# Patient Record
Sex: FEMALE | Race: WHITE | NOT HISPANIC OR LATINO | Employment: FULL TIME | URBAN - METROPOLITAN AREA
[De-identification: names, ages, dates, MRNs, and addresses within clinical notes are randomized per-mention and may not be internally consistent; named-entity substitution may affect disease eponyms.]

---

## 2017-01-12 ENCOUNTER — OFFICE VISIT (OUTPATIENT)
Dept: INTERNAL MEDICINE | Facility: CLINIC | Age: 52
End: 2017-01-12

## 2017-01-12 VITALS
DIASTOLIC BLOOD PRESSURE: 62 MMHG | TEMPERATURE: 98.2 F | HEART RATE: 73 BPM | HEIGHT: 70 IN | OXYGEN SATURATION: 98 % | WEIGHT: 190 LBS | BODY MASS INDEX: 27.2 KG/M2 | SYSTOLIC BLOOD PRESSURE: 100 MMHG

## 2017-01-12 DIAGNOSIS — K21.9 GASTROESOPHAGEAL REFLUX DISEASE, ESOPHAGITIS PRESENCE NOT SPECIFIED: ICD-10-CM

## 2017-01-12 DIAGNOSIS — E55.9 AVITAMINOSIS D: Primary | ICD-10-CM

## 2017-01-12 DIAGNOSIS — Z00.00 HEALTH CARE MAINTENANCE: ICD-10-CM

## 2017-01-12 DIAGNOSIS — E78.2 MIXED HYPERLIPIDEMIA: ICD-10-CM

## 2017-01-12 DIAGNOSIS — M79.10 MUSCLE ACHE: ICD-10-CM

## 2017-01-12 DIAGNOSIS — E55.9 AVITAMINOSIS D: ICD-10-CM

## 2017-01-12 PROCEDURE — 99214 OFFICE O/P EST MOD 30 MIN: CPT | Performed by: FAMILY MEDICINE

## 2017-01-12 RX ORDER — CALCIUM CARBONATE 750 MG/1
750 TABLET, CHEWABLE ORAL
COMMUNITY
Start: 2021-01-01

## 2017-01-12 RX ORDER — OMEPRAZOLE 40 MG/1
40 CAPSULE, DELAYED RELEASE ORAL DAILY
Qty: 30 CAPSULE | Refills: 3 | Status: SHIPPED | OUTPATIENT
Start: 2017-01-12 | End: 2018-03-01 | Stop reason: SDUPTHER

## 2017-01-12 NOTE — MR AVS SNAPSHOT
Kalpana Stringer   1/12/2017 9:00 AM   Office Visit    Dept Phone:  [phone]   Encounter #:  [id number]    Provider:  Junior Rios MD   Department:  [facility]                Your Full Care Plan              Today's Medication Changes          These changes are accurate as of: 1/12/17  9:55 AM.  If you have any questions, ask your nurse or doctor.               New Medication(s)Ordered:     omeprazole 40 MG capsule   Commonly known as:  PRILOSEC   Take 1 capsule by mouth Daily.   Started by:  Junior Rios MD            Where to Get Your Medications      These medications were sent to [address] - [address] AT [facility]  - [phone]  - [phone]   [address] [place] [address]     Phone:  [phone]     omeprazole 40 MG capsule                  Your Updated Medication List          This list is accurate as of: 1/12/17  9:55 AM.  Always use your most recent med list.                acetaminophen 650 MG 8 hr tablet   Commonly known as:  TYLENOL       calcium carbonate  MG chewable tablet   Commonly known as:  TUMS EX       meloxicam 15 MG tablet   Commonly known as:  MOBIC   Take 1 tablet by mouth daily.       naproxen sodium 220 MG tablet   Commonly known as:  ALEVE       NON FORMULARY       omeprazole 40 MG capsule   Commonly known as:  PRILOSEC   Take 1 capsule by mouth Daily.               We Performed the Following     Ambulatory Referral to Gynecology     H. Pylori Breath Test     Lipid Panel       You Were Diagnosed With        Codes Comments    Avitaminosis D    -  Primary ICD-10-CM: E55.9  ICD-9-CM: 268.9     Muscle ache     ICD-10-CM: M79.1  ICD-9-CM: 729.1     Gastroesophageal reflux disease, esophagitis presence not specified     ICD-10-CM: K21.9  ICD-9-CM: 530.81     Mixed hyperlipidemia     ICD-10-CM: E78.2  ICD-9-CM: 272.2     Health care  "maintenance     ICD-10-CM: Z00.00  ICD-9-CM: V70.0       Instructions     None    Patient Instructions History      Upcoming Appointments     Visit Type Date Time Department    OFFICE VISIT 2017  9:00 AM [facility] [place]      [email or web address] Signup     [facility] [email or web address] allows you to send messages to your doctor, view your test results, renew your prescriptions, schedule appointments, and more. To sign up, go to [employer] and click on the Sign Up Now link in the New User? box. Enter your [email or web address] Activation Code exactly as it appears below along with the last four digits of your Social Security Number and your Date of Birth () to complete the sign-up process. If you do not sign up before the expiration date, you must request a new code.    [email or web address] Activation Code: [email or web address]  Expires: 2017  9:55 AM    If you have questions, you can email [email or web address] or call [phone] to talk to our [email or web address] staff. Remember, [email or web address] is NOT to be used for urgent needs. For medical emergencies, dial 911.               Other Info from Your Visit           Allergies     No Known Allergies      Reason for Visit     been feeling bad (feels a catch in the neck) right side    heartburns for about a month    Cough for about a month      Vital Signs     Blood Pressure Pulse Temperature Height Weight Oxygen Saturation    100/62 (BP Location: Left arm, Patient Position: Sitting, Cuff Size: Large Adult) 73 98.2 °F (36.8 °C) (Oral) 70\" (177.8 cm) 190 lb (86.2 kg) 98%    Breastfeeding? Body Mass Index Smoking Status             No 27.26 kg/m2 Current Some Day Smoker         Problems and Diagnoses Noted     Avitaminosis D    Acid reflux disease    High cholesterol or triglycerides    Muscle ache    Health maintenance examination            "

## 2017-01-12 NOTE — PROGRESS NOTES
Subjective   Kalpana Stringer is a 51 y.o. female.     Chief Complaint   Patient presents with   • been feeling bad     (feels a catch in the neck) right side   • heartburns     for about a month   • Cough     for about a month         History of Present Illness   Tums Zantac for heartburn which is been developing in the last month she has had mostly a burning sensation radiating from her upper abdomen throat she does not drink alcohol she does not smoke some caffeine use symptoms are worsening  Mobic is used intermittently for muscle skeletal pain she did not have lab drawn was recommended previous visit we'll try to have that drawn today she has not had recent pelvic exam.  Does have a history of anxiety and vitamin D deficiency  The following portions of the patient's history were reviewed and updated as appropriate: allergies, current medications, past family history, past medical history, past social history, past surgical history and problem list.    Review of Systems   Constitutional: Negative for activity change, appetite change, fatigue, fever and unexpected weight change.   HENT: Negative for nosebleeds and trouble swallowing.    Eyes: Negative for pain and visual disturbance.   Respiratory: Negative for chest tightness, shortness of breath and wheezing.    Cardiovascular: Negative for chest pain and palpitations.   Gastrointestinal: Negative for abdominal pain and blood in stool.   Endocrine: Negative.    Genitourinary: Negative for difficulty urinating and hematuria.   Musculoskeletal: Positive for arthralgias and myalgias. Negative for joint swelling.   Skin: Negative for color change and rash.   Allergic/Immunologic: Negative.    Neurological: Negative for syncope, speech difficulty and weakness.   Hematological: Negative for adenopathy.   Psychiatric/Behavioral: Negative for agitation and confusion.   All other systems reviewed and are negative.      Objective   Physical Exam   Constitutional: She is  oriented to person, place, and time. She appears well-developed and well-nourished. No distress.   HENT:   Head: Normocephalic and atraumatic.   Eyes: Conjunctivae and EOM are normal. Pupils are equal, round, and reactive to light. Right eye exhibits no discharge. Left eye exhibits no discharge. No scleral icterus.   Neck: Normal range of motion. Neck supple. No tracheal deviation present. No thyromegaly present.   Cardiovascular: Normal rate, regular rhythm, normal heart sounds, intact distal pulses and normal pulses.  Exam reveals no gallop.    No murmur heard.  Pulmonary/Chest: Effort normal and breath sounds normal. No respiratory distress. She has no wheezes. She has no rales.   Abdominal: Soft. There is tenderness.   Epigastric   Musculoskeletal: Normal range of motion.   Tenderness since healed bilaterally no erythema noted high arched foot  tenderness knees elbows and ankles without any evidence of effusion   Neurological: She is alert and oriented to person, place, and time. She exhibits normal muscle tone. Coordination normal.   Skin: Skin is warm. No rash noted. No erythema. No pallor.   Psychiatric: She has a normal mood and affect. Her behavior is normal. Judgment and thought content normal.   Nursing note and vitals reviewed.      Assessment/Plan   Kalpana was seen today for been feeling bad, heartburns and cough.    Diagnoses and all orders for this visit:    Avitaminosis D  -     Vitamin D 25 Hydroxy; Future  -     Comprehensive Metabolic Panel; Future  -     CBC & AUTO Differential; Future    Muscle ache  -     LINH; Future  -     RHEUMATOID FACTOR; Future  -     Uric Acid; Future  -     TSH; Future  -     Comprehensive Metabolic Panel; Future  -     CBC & AUTO Differential; Future    Gastroesophageal reflux disease, esophagitis presence not specified  -     Cancel: H. Pylori Breath Test  -     H. Pylori Breath Test; Future  -     Comprehensive Metabolic Panel; Future  -     CBC & AUTO Differential;  Future    Mixed hyperlipidemia  -     Comprehensive Metabolic Panel; Future  -     CBC & AUTO Differential; Future    Health care maintenance  -     Ambulatory Referral to Gynecology  -     Lipid Panel  -     H. Pylori Breath Test; Future  -     LINH; Future  -     RHEUMATOID FACTOR; Future  -     Vitamin D 25 Hydroxy; Future  -     Uric Acid; Future  -     TSH; Future  -     Comprehensive Metabolic Panel; Future  -     CBC & AUTO Differential; Future    Other orders  -     omeprazole (PRILOSEC) 40 MG capsule; Take 1 capsule by mouth Daily.     follow-up results of blood work and breath test

## 2017-01-18 ENCOUNTER — TELEPHONE (OUTPATIENT)
Dept: INTERNAL MEDICINE | Facility: CLINIC | Age: 52
End: 2017-01-18

## 2017-01-18 LAB
25(OH)D3+25(OH)D2 SERPL-MCNC: 28.1 NG/ML (ref 30–100)
ALBUMIN SERPL-MCNC: 4.2 G/DL (ref 3.5–5.2)
ALBUMIN/GLOB SERPL: 1.7 G/DL
ALP SERPL-CCNC: 62 U/L (ref 39–117)
ALT SERPL-CCNC: 19 U/L (ref 1–33)
ANA SER QL: NEGATIVE
AST SERPL-CCNC: 16 U/L (ref 1–32)
BASOPHILS # BLD AUTO: 0.06 10*3/MM3 (ref 0–0.2)
BASOPHILS NFR BLD AUTO: 1.1 % (ref 0–1.5)
BILIRUB SERPL-MCNC: 0.3 MG/DL (ref 0.1–1.2)
BUN SERPL-MCNC: 18 MG/DL (ref 6–20)
BUN/CREAT SERPL: 20 (ref 7–25)
CALCIUM SERPL-MCNC: 9.6 MG/DL (ref 8.6–10.5)
CHLORIDE SERPL-SCNC: 102 MMOL/L (ref 98–107)
CHOLEST SERPL-MCNC: 202 MG/DL (ref 0–200)
CO2 SERPL-SCNC: 27 MMOL/L (ref 22–29)
CREAT SERPL-MCNC: 0.9 MG/DL (ref 0.57–1)
EOSINOPHIL # BLD AUTO: 0.12 10*3/MM3 (ref 0–0.7)
EOSINOPHIL NFR BLD AUTO: 2.3 % (ref 0.3–6.2)
ERYTHROCYTE [DISTWIDTH] IN BLOOD BY AUTOMATED COUNT: 13.8 % (ref 11.7–13)
GLOBULIN SER CALC-MCNC: 2.5 GM/DL
GLUCOSE SERPL-MCNC: 92 MG/DL (ref 65–99)
HCT VFR BLD AUTO: 40 % (ref 35.6–45.5)
HDLC SERPL-MCNC: 62 MG/DL (ref 40–60)
HGB BLD-MCNC: 12.6 G/DL (ref 11.9–15.5)
IMM GRANULOCYTES # BLD: 0 10*3/MM3 (ref 0–0.03)
IMM GRANULOCYTES NFR BLD: 0 % (ref 0–0.5)
LDLC SERPL CALC-MCNC: 115 MG/DL (ref 0–100)
LYMPHOCYTES # BLD AUTO: 1.69 10*3/MM3 (ref 0.9–4.8)
LYMPHOCYTES NFR BLD AUTO: 31.9 % (ref 19.6–45.3)
MCH RBC QN AUTO: 29.6 PG (ref 26.9–32)
MCHC RBC AUTO-ENTMCNC: 31.5 G/DL (ref 32.4–36.3)
MCV RBC AUTO: 94.1 FL (ref 80.5–98.2)
MONOCYTES # BLD AUTO: 0.73 10*3/MM3 (ref 0.2–1.2)
MONOCYTES NFR BLD AUTO: 13.8 % (ref 5–12)
NEUTROPHILS # BLD AUTO: 2.7 10*3/MM3 (ref 1.9–8.1)
NEUTROPHILS NFR BLD AUTO: 50.9 % (ref 42.7–76)
PLATELET # BLD AUTO: 226 10*3/MM3 (ref 140–500)
POTASSIUM SERPL-SCNC: 4.2 MMOL/L (ref 3.5–5.2)
PROT SERPL-MCNC: 6.7 G/DL (ref 6–8.5)
RBC # BLD AUTO: 4.25 10*6/MM3 (ref 3.9–5.2)
RHEUMATOID FACT SERPL-ACNC: <10 IU/ML (ref 0–13.9)
SODIUM SERPL-SCNC: 141 MMOL/L (ref 136–145)
TRIGL SERPL-MCNC: 123 MG/DL (ref 0–150)
TSH SERPL DL<=0.005 MIU/L-ACNC: 1.69 MIU/ML (ref 0.27–4.2)
URATE SERPL-MCNC: 4.5 MG/DL (ref 2.4–5.7)
UREA BREATH TEST QL: POSITIVE
VLDLC SERPL CALC-MCNC: 24.6 MG/DL (ref 5–40)
WBC # BLD AUTO: 5.3 10*3/MM3 (ref 4.5–10.7)

## 2017-01-18 RX ORDER — AMOXICILLIN 500 MG/1
1000 CAPSULE ORAL 2 TIMES DAILY
Qty: 40 CAPSULE | Refills: 0 | Status: SHIPPED | OUTPATIENT
Start: 2017-01-18 | End: 2018-07-14

## 2017-01-18 RX ORDER — CLARITHROMYCIN 500 MG/1
500 TABLET, COATED ORAL 2 TIMES DAILY
Qty: 20 TABLET | Refills: 0 | Status: SHIPPED | OUTPATIENT
Start: 2017-01-18 | End: 2018-07-14

## 2017-01-18 NOTE — TELEPHONE ENCOUNTER
----- Message from Junior Rios MD sent at 1/18/2017  2:24 PM EST -----  H. Pylori stomach test is positive and given clinical situation would recommend treatment with double antibiotic prescription continue the omeprazole start amoxicillin 1 g twice a day for 10 days and clarithromycin 500 mg twice a day for 10 days then follow up one month, sooner if symptoms worsen

## 2018-03-01 RX ORDER — OMEPRAZOLE 40 MG/1
40 CAPSULE, DELAYED RELEASE ORAL DAILY
Qty: 30 CAPSULE | Refills: 0 | Status: SHIPPED | OUTPATIENT
Start: 2018-03-01 | End: 2018-08-21 | Stop reason: SDUPTHER

## 2018-03-01 RX ORDER — OMEPRAZOLE 40 MG/1
40 CAPSULE, DELAYED RELEASE ORAL DAILY
Qty: 30 CAPSULE | Refills: 3 | Status: CANCELLED | OUTPATIENT
Start: 2018-03-01

## 2018-04-12 ENCOUNTER — TELEPHONE (OUTPATIENT)
Dept: INTERNAL MEDICINE | Facility: CLINIC | Age: 53
End: 2018-04-12

## 2018-04-13 ENCOUNTER — TELEPHONE (OUTPATIENT)
Dept: INTERNAL MEDICINE | Facility: CLINIC | Age: 53
End: 2018-04-13

## 2018-04-13 DIAGNOSIS — Z20.2 EXPOSURE TO STD: Primary | ICD-10-CM

## 2018-04-13 NOTE — TELEPHONE ENCOUNTER
Patient called stating that she is 110% sure that she has herpes from a previous partner.  Her vagina is very painful and lesions.  Patient wanted to know if she should be seen by Dr. Rios or be referred to GYN.  Please advise.

## 2018-05-16 ENCOUNTER — PROCEDURE VISIT (OUTPATIENT)
Dept: OBSTETRICS AND GYNECOLOGY | Facility: CLINIC | Age: 53
End: 2018-05-16

## 2018-05-16 ENCOUNTER — OFFICE VISIT (OUTPATIENT)
Dept: OBSTETRICS AND GYNECOLOGY | Facility: CLINIC | Age: 53
End: 2018-05-16

## 2018-05-16 VITALS — BODY MASS INDEX: 28.61 KG/M2 | DIASTOLIC BLOOD PRESSURE: 70 MMHG | WEIGHT: 199.4 LBS | SYSTOLIC BLOOD PRESSURE: 110 MMHG

## 2018-05-16 DIAGNOSIS — R10.2 PELVIC PAIN: Primary | ICD-10-CM

## 2018-05-16 DIAGNOSIS — Z01.419 ENCOUNTER FOR WELL WOMAN EXAM WITH ROUTINE GYNECOLOGICAL EXAM: ICD-10-CM

## 2018-05-16 DIAGNOSIS — R10.2 PELVIC PAIN IN FEMALE: ICD-10-CM

## 2018-05-16 DIAGNOSIS — Z00.00 ANNUAL PHYSICAL EXAM: ICD-10-CM

## 2018-05-16 DIAGNOSIS — Z12.31 VISIT FOR SCREENING MAMMOGRAM: ICD-10-CM

## 2018-05-16 DIAGNOSIS — B00.9 HERPES: ICD-10-CM

## 2018-05-16 PROCEDURE — 76830 TRANSVAGINAL US NON-OB: CPT | Performed by: OBSTETRICS & GYNECOLOGY

## 2018-05-16 PROCEDURE — 99386 PREV VISIT NEW AGE 40-64: CPT | Performed by: OBSTETRICS & GYNECOLOGY

## 2018-05-16 PROCEDURE — 99213 OFFICE O/P EST LOW 20 MIN: CPT | Performed by: OBSTETRICS & GYNECOLOGY

## 2018-05-16 RX ORDER — VALACYCLOVIR HYDROCHLORIDE 500 MG/1
500 TABLET, FILM COATED ORAL 2 TIMES DAILY
Qty: 18 TABLET | Refills: 5 | Status: SHIPPED | OUTPATIENT
Start: 2018-05-16 | End: 2018-05-19

## 2018-05-16 NOTE — PROGRESS NOTES
HPI   Kalpana Stringer  is a 53 y.o. female who presents to [facility] care and have a routine gynecologic exam.  We discussed the importance of regular screening mammograms as well as the importance of colonoscopy.  The patient is been menopausal for 2 years.  She also complains of some pelvic pain and pressure.  She reports that the pain is intermittent and sharp.  It is noncyclic.  It originally occurred only several times a week, but now occurs daily.  Episodes of pain lasts for approximately 5 minutes.  The patient has not expressed any vaginal bleeding in 2 years.  Also, she was exposed to herpes a proximally 6 months ago.  By history, she had what extent appears to be a primary herpes outbreak in January.  She has experienced one additional herpes outbreak approximately 1 month ago.  She would like to discuss options for management.    Chief Complaint   Patient presents with   • Gynecologic Exam   • Exposure to STD     Exposed to herpes   • Menopause     Hasn't had a cycle in a couple of years and has had a discomfort in the pelvic area for a couple of months       Past Medical History:   Diagnosis Date   • Anxiety    • Hyperlipidemia    • Subclavian artery stenosis        Past Surgical History:   Procedure Laterality Date   • TUBAL ABDOMINAL LIGATION         Social History     Social History   • Marital status: Unknown     Spouse name: N/A   • Number of children: 2   • Years of education: N/A     Occupational History   • Not on file.     Social History Main Topics   • Smoking status: Current Some Day Smoker     Types: Cigarettes   • Smokeless tobacco: Current User   • Alcohol use Yes      Comment: Socially   • Drug use: No   • Sexual activity: Not Currently     Partners: Male     Birth control/ protection: Surgical     Other Topics Concern   • Not on file     Social History Narrative   • No narrative on file       The following portions of the patient's history were reviewed and updated as appropriate:  allergies, current medications, past family history, past medical history, past social history, past surgical history and problem list.    Review of Systems is positive for pelvic pain and pressure.  It is negative for nausea or vomiting.  It is negative for fever or chills.  It is positive for episodes of vesicular vulvar outbreaks.  It is negative for unexplained weight change.  All other systems are reviewed and are negative          Physical Exam   Constitutional: She is oriented to person, place, and time. She appears well-developed and well-nourished.   HENT:   Head: Normocephalic and atraumatic.   Cardiovascular: Normal rate and regular rhythm.    Pulmonary/Chest: Effort normal and breath sounds normal. She has no wheezes. She has no rales.   The breasts are equal in size.  There are no palpable lumps.  Nipple discharge and axillary adenopathy are absent.   Abdominal: Soft. She exhibits no distension. There is no tenderness.   Genitourinary: Vagina normal and uterus normal. There is no lesion on the right labia. There is no lesion on the left labia. Cervix exhibits no motion tenderness. Right adnexum displays no mass and no tenderness. Left adnexum displays no mass and no tenderness. No vaginal discharge found.   Neurological: She is alert and oriented to person, place, and time.   Skin: Skin is warm and dry.   Nursing note and vitals reviewed.      Assessment    Kalpana was seen today for gynecologic exam, exposure to std and menopause.    Diagnoses and all orders for this visit:    Pelvic pain  -     US Non-ob Transvaginal    Herpes    Annual physical exam  -     Ambulatory Referral to General Surgery        Plan  1. Normal gynecologic exam  2. Counseled regarding a cog recommendations for mammograms every year after the age of 50.  Patient's last mammogram was 3 years ago.  She would like to have a mammogram.  We will help her to arrange this.  3. Counseled regarding recommendations for colonoscopy every 10  years.  The patient has not yet undergone this.  We discussed the importance of it.  The patient would like to proceed.  Referral will be made.  4. Herpes type II.  We discussed the pathophysiology of this condition and options for management.  The patient has experienced 2 outbreaks over the last year.  She does not wish to proceed with suppression, but would like to have episodic Valtrex available.  A prescription will be sent to the patient's pharmacy.  She would also like to do blood work to confirm the diagnosis.  We discussed the limitations of this testing.  The patient would like to proceed.  5. Intermittent pelvic pain and pressure.  Physical examination is nonfocal.  Counseled.  We will check an ultrasound to assess the endometrium and the adnexa.      History   Smoking Status   • Current Some Day Smoker   • Types: Cigarettes   6.     7.

## 2018-05-17 LAB
HSV1 IGG SER IA-ACNC: <0.91 INDEX (ref 0–0.9)
HSV1+2 IGM SER IA-ACNC: 1.52 RATIO (ref 0–0.9)
HSV2 IGG SER IA-ACNC: 10.9 INDEX (ref 0–0.9)

## 2018-05-21 LAB
CONV .: NORMAL
CYTOLOGIST CVX/VAG CYTO: NORMAL
CYTOLOGY CVX/VAG DOC THIN PREP: NORMAL
DX ICD CODE: NORMAL
HIV 1 & 2 AB SER-IMP: NORMAL
OTHER STN SPEC: NORMAL
PATH REPORT.FINAL DX SPEC: NORMAL
STAT OF ADQ CVX/VAG CYTO-IMP: NORMAL

## 2018-05-24 ENCOUNTER — TELEPHONE (OUTPATIENT)
Dept: OBSTETRICS AND GYNECOLOGY | Facility: CLINIC | Age: 53
End: 2018-05-24

## 2018-05-24 NOTE — TELEPHONE ENCOUNTER
----- Message from Roger Clarke MD sent at 5/23/2018  3:01 PM EDT -----  I could not reach the patient by phone despite several attempts.  Please contact her regarding 2 issues.  First, her herpes cultures were positive.  This will not be unexpected.  If the patient has questions or concerns, please let me know a time that I can call her to discuss them.  Second, the patient's ultrasound was negative with respect to the uterus, tubes and ovaries.  It did, however, show very active loops of bowel.  This may be consistent with spastic colon or irritable bowel.  I would be happy to arrange a follow-up with a gastroenterologist for further workup.  Please let me know.  Thank you.

## 2018-07-14 ENCOUNTER — APPOINTMENT (OUTPATIENT)
Dept: GENERAL RADIOLOGY | Facility: HOSPITAL | Age: 53
End: 2018-07-14

## 2018-07-14 ENCOUNTER — HOSPITAL ENCOUNTER (EMERGENCY)
Facility: HOSPITAL | Age: 53
Discharge: HOME | End: 2018-07-14
Attending: EMERGENCY MEDICINE | Admitting: EMERGENCY MEDICINE

## 2018-07-14 VITALS
OXYGEN SATURATION: 96 % | BODY MASS INDEX: 26.48 KG/M2 | HEART RATE: 53 BPM | RESPIRATION RATE: 18 BRPM | DIASTOLIC BLOOD PRESSURE: 74 MMHG | SYSTOLIC BLOOD PRESSURE: 132 MMHG | TEMPERATURE: 98.1 F | HEIGHT: 70 IN | WEIGHT: 185 LBS

## 2018-07-14 DIAGNOSIS — R07.89 ATYPICAL CHEST PAIN: Primary | ICD-10-CM

## 2018-07-14 DIAGNOSIS — R91.1 NODULE OF RIGHT LUNG: ICD-10-CM

## 2018-07-14 LAB
ALBUMIN SERPL-MCNC: 4.3 G/DL (ref 3.5–5.2)
ALBUMIN/GLOB SERPL: 1.6 G/DL
ALP SERPL-CCNC: 64 U/L (ref 39–117)
ALT SERPL W P-5'-P-CCNC: 38 U/L (ref 1–33)
ANION GAP SERPL CALCULATED.3IONS-SCNC: 12.2 MMOL/L
AST SERPL-CCNC: 23 U/L (ref 1–32)
BASOPHILS # BLD AUTO: 0.04 10*3/MM3 (ref 0–0.2)
BASOPHILS NFR BLD AUTO: 0.8 % (ref 0–1.5)
BILIRUB SERPL-MCNC: 0.3 MG/DL (ref 0.1–1.2)
BUN BLD-MCNC: 14 MG/DL (ref 6–20)
BUN/CREAT SERPL: 16.1 (ref 7–25)
CALCIUM SPEC-SCNC: 8.8 MG/DL (ref 8.6–10.5)
CHLORIDE SERPL-SCNC: 103 MMOL/L (ref 98–107)
CO2 SERPL-SCNC: 24.8 MMOL/L (ref 22–29)
CREAT BLD-MCNC: 0.87 MG/DL (ref 0.57–1)
DEPRECATED RDW RBC AUTO: 45.5 FL (ref 37–54)
EOSINOPHIL # BLD AUTO: 0.13 10*3/MM3 (ref 0–0.7)
EOSINOPHIL NFR BLD AUTO: 2.7 % (ref 0.3–6.2)
ERYTHROCYTE [DISTWIDTH] IN BLOOD BY AUTOMATED COUNT: 13.4 % (ref 11.7–13)
GFR SERPL CREATININE-BSD FRML MDRD: 69 ML/MIN/1.73
GLOBULIN UR ELPH-MCNC: 2.7 GM/DL
GLUCOSE BLD-MCNC: 83 MG/DL (ref 65–99)
HCT VFR BLD AUTO: 39.5 % (ref 35.6–45.5)
HGB BLD-MCNC: 12.4 G/DL (ref 11.9–15.5)
HOLD SPECIMEN: NORMAL
HOLD SPECIMEN: NORMAL
IMM GRANULOCYTES # BLD: 0 10*3/MM3 (ref 0–0.03)
IMM GRANULOCYTES NFR BLD: 0 % (ref 0–0.5)
INR PPP: 1.01 (ref 0.9–1.1)
LYMPHOCYTES # BLD AUTO: 2.4 10*3/MM3 (ref 0.9–4.8)
LYMPHOCYTES NFR BLD AUTO: 50.6 % (ref 19.6–45.3)
MCH RBC QN AUTO: 29.4 PG (ref 26.9–32)
MCHC RBC AUTO-ENTMCNC: 31.4 G/DL (ref 32.4–36.3)
MCV RBC AUTO: 93.6 FL (ref 80.5–98.2)
MONOCYTES # BLD AUTO: 0.44 10*3/MM3 (ref 0.2–1.2)
MONOCYTES NFR BLD AUTO: 9.3 % (ref 5–12)
NEUTROPHILS # BLD AUTO: 1.73 10*3/MM3 (ref 1.9–8.1)
NEUTROPHILS NFR BLD AUTO: 36.6 % (ref 42.7–76)
PLATELET # BLD AUTO: 238 10*3/MM3 (ref 140–500)
PMV BLD AUTO: 9.8 FL (ref 6–12)
POTASSIUM BLD-SCNC: 3.8 MMOL/L (ref 3.5–5.2)
PROT SERPL-MCNC: 7 G/DL (ref 6–8.5)
PROTHROMBIN TIME: 13.1 SECONDS (ref 11.7–14.2)
RBC # BLD AUTO: 4.22 10*6/MM3 (ref 3.9–5.2)
SODIUM BLD-SCNC: 140 MMOL/L (ref 136–145)
TROPONIN T SERPL-MCNC: <0.01 NG/ML (ref 0–0.03)
WBC NRBC COR # BLD: 4.74 10*3/MM3 (ref 4.5–10.7)
WHOLE BLOOD HOLD SPECIMEN: NORMAL
WHOLE BLOOD HOLD SPECIMEN: NORMAL

## 2018-07-14 PROCEDURE — 99284 EMERGENCY DEPT VISIT MOD MDM: CPT

## 2018-07-14 PROCEDURE — 93005 ELECTROCARDIOGRAM TRACING: CPT | Performed by: EMERGENCY MEDICINE

## 2018-07-14 PROCEDURE — 93005 ELECTROCARDIOGRAM TRACING: CPT

## 2018-07-14 PROCEDURE — 84484 ASSAY OF TROPONIN QUANT: CPT | Performed by: PHYSICIAN ASSISTANT

## 2018-07-14 PROCEDURE — 36415 COLL VENOUS BLD VENIPUNCTURE: CPT

## 2018-07-14 PROCEDURE — 85025 COMPLETE CBC W/AUTO DIFF WBC: CPT | Performed by: PHYSICIAN ASSISTANT

## 2018-07-14 PROCEDURE — 93010 ELECTROCARDIOGRAM REPORT: CPT | Performed by: INTERNAL MEDICINE

## 2018-07-14 PROCEDURE — 71046 X-RAY EXAM CHEST 2 VIEWS: CPT

## 2018-07-14 PROCEDURE — 85610 PROTHROMBIN TIME: CPT | Performed by: PHYSICIAN ASSISTANT

## 2018-07-14 PROCEDURE — 80053 COMPREHEN METABOLIC PANEL: CPT | Performed by: PHYSICIAN ASSISTANT

## 2018-07-14 RX ORDER — IBUPROFEN 200 MG
200 TABLET ORAL EVERY 6 HOURS PRN
COMMUNITY
End: 2018-07-14

## 2018-07-14 RX ORDER — SODIUM CHLORIDE 0.9 % (FLUSH) 0.9 %
10 SYRINGE (ML) INJECTION AS NEEDED
Status: DISCONTINUED | OUTPATIENT
Start: 2018-07-14 | End: 2018-07-14 | Stop reason: HOSPADM

## 2018-07-14 RX ORDER — CYCLOBENZAPRINE HCL 10 MG
5 TABLET ORAL 3 TIMES DAILY PRN
COMMUNITY
End: 2020-06-01

## 2018-07-14 RX ORDER — ASPIRIN 325 MG
325 TABLET ORAL ONCE
Status: DISCONTINUED | OUTPATIENT
Start: 2018-07-14 | End: 2018-07-14 | Stop reason: HOSPADM

## 2018-07-14 NOTE — ED PROVIDER NOTES
EMERGENCY DEPARTMENT ENCOUNTER    Room Number:  29/29  Date seen:  7/14/2018  Time seen: 6:47 PM  PCP: Junior Rios MD  Historian: patient  History Limited By: N/A      HPI:  Chief Complaint: chest pain  Context: Kalpana Stringer is a 53 y.o. female who presents to the ED c/o chest pain. She reports that for approximately the past 6 months, she has had aching left upper chest pain radiating to the left upper arm. It was initially intermittent but has been constant for the past week. It is not affected by movement, taking deep breaths, coughing, eating food, or exertion and has no aggravating factors or alleviating factors. She reports that she has also had dyspnea. She denies nausea, vomiting, sweating, weakness, dizziness, fevers, chills, abd pain, diarrhea, pain and difficulty with urination, acute change in chronic right ankle swelling/new BLE swelling, illicit drug use, personal hx of heart disease/HTN/diabetes/hyperlipidemia, and family hx of heart disease. She notes that she is a smoker and drinks ETOH socially. Pt has no other complaints at this time.    Location: left upper chest  Radiation: left upper arm  Quality: aching  Intensity/Severity: moderate  Duration: for about 6 months   Onset quality: gradual  Timing: initially intermittent, constant over the past week  Progression: initially intermittent, constant over the past week  Aggravating Factors: none  Alleviating Factors: none  Previous Episodes: none  Treatment before arrival: none mentioned  Associated Symptoms: dyspnea        PAST MEDICAL HISTORY  Active Ambulatory Problems     Diagnosis Date Noted   • Anxiety 04/28/2016   • HLD (hyperlipidemia) 04/28/2016   • Muscle ache 04/28/2016   • Stenosis of subclavian artery (CMS/HCC) 04/28/2016   • Avitaminosis D 04/28/2016   • Arthritis 04/28/2016   • Gastroesophageal reflux disease 01/12/2017     Resolved Ambulatory Problems     Diagnosis Date Noted   • No Resolved Ambulatory Problems     Past  Medical History:   Diagnosis Date   • Anxiety    • Hyperlipidemia    • Subclavian artery stenosis (CMS/HCC)          PAST SURGICAL HISTORY  Past Surgical History:   Procedure Laterality Date   • TUBAL ABDOMINAL LIGATION           FAMILY HISTORY  Family History   Problem Relation Age of Onset   • Diabetes Mother    • Hyperlipidemia Mother    • Hypertension Mother    • Stroke Mother    • Hyperlipidemia Father    • Hypertension Father    • Arthritis Father         Rheumatoid   • Fibromyalgia Sister    • Breast cancer Paternal Aunt    • Cervical cancer Paternal Aunt          SOCIAL HISTORY  Social History     Social History   • Marital status: Single     Spouse name: N/A   • Number of children: 2   • Years of education: N/A     Occupational History   • Not on file.     Social History Main Topics   • Smoking status: Current Some Day Smoker     Types: Cigarettes   • Smokeless tobacco: Current User   • Alcohol use Yes      Comment: Socially   • Drug use: No   • Sexual activity: Not Currently     Partners: Male     Birth control/ protection: Surgical     Other Topics Concern   • Not on file     Social History Narrative   • No narrative on file         ALLERGIES  Patient has no known allergies.        REVIEW OF SYSTEMS  Review of Systems   Constitutional: Negative for chills, diaphoresis and fever.   HENT: Negative for congestion, rhinorrhea and sore throat.    Eyes: Negative for pain.   Respiratory: Positive for shortness of breath. Negative for cough.    Cardiovascular: Positive for chest pain (left upper chest pain radiating to left upper arm). Negative for palpitations. Leg swelling: no acute change in chronic right ankle swelling/no new BLE swelling.   Gastrointestinal: Negative for abdominal pain, diarrhea, nausea and vomiting.   Endocrine: Negative.    Genitourinary: Negative for difficulty urinating.   Musculoskeletal: Negative for myalgias.   Skin: Negative.    Neurological: Negative for speech difficulty, weakness,  numbness and headaches.   Psychiatric/Behavioral: Negative for behavioral problems.   All other systems reviewed and are negative.           PHYSICAL EXAM  ED Triage Vitals   Temp Heart Rate Resp BP SpO2   07/14/18 1624 07/14/18 1624 07/14/18 1624 07/14/18 1636 07/14/18 1624   98.1 °F (36.7 °C) 95 18 154/89 98 % WNL      Temp src Heart Rate Source Patient Position BP Location FiO2 (%)   07/14/18 1624 07/14/18 1624 07/14/18 1813 07/14/18 1813 --   Tympanic Monitor Sitting Right arm        Physical Exam   Constitutional: She is oriented to person, place, and time. No distress.   HENT:   Head: Normocephalic.   Mouth/Throat: Mucous membranes are normal.   Eyes: EOM are normal. Pupils are equal, round, and reactive to light.   Neck: Normal range of motion. Neck supple.   Cardiovascular: Normal rate, regular rhythm, normal heart sounds and intact distal pulses.    Pulses:       Radial pulses are 2+ on the left side.        Posterior tibial pulses are 2+ on the right side, and 2+ on the left side.   Pulmonary/Chest: Effort normal and breath sounds normal. No respiratory distress. She has no decreased breath sounds. She has no wheezes. She has no rhonchi. She has no rales. She exhibits tenderness (tenderness to upper left chest wall that reproduces chest pain).   Abdominal: Soft. There is no tenderness. There is no rebound and no guarding.   Musculoskeletal: Normal range of motion. She exhibits no edema (no BLE edema).   Tenderness to soft tissue of anterior left shoulder (no bony tenderness), NV intact distally to BUE   Neurological: She is alert and oriented to person, place, and time. She has normal motor skills and normal sensation.   Skin: Skin is warm and dry.   Psychiatric: Mood and affect normal.   Nursing note and vitals reviewed.          LAB RESULTS  Recent Results (from the past 24 hour(s))   Comprehensive Metabolic Panel    Collection Time: 07/14/18  5:35 PM   Result Value Ref Range    Glucose 83 65 - 99 mg/dL     BUN 14 6 - 20 mg/dL    Creatinine 0.87 0.57 - 1.00 mg/dL    Sodium 140 136 - 145 mmol/L    Potassium 3.8 3.5 - 5.2 mmol/L    Chloride 103 98 - 107 mmol/L    CO2 24.8 22.0 - 29.0 mmol/L    Calcium 8.8 8.6 - 10.5 mg/dL    Total Protein 7.0 6.0 - 8.5 g/dL    Albumin 4.30 3.50 - 5.20 g/dL    ALT (SGPT) 38 (H) 1 - 33 U/L    AST (SGOT) 23 1 - 32 U/L    Alkaline Phosphatase 64 39 - 117 U/L    Total Bilirubin 0.3 0.1 - 1.2 mg/dL    eGFR Non African Amer 69 >60 mL/min/1.73    Globulin 2.7 gm/dL    A/G Ratio 1.6 g/dL    BUN/Creatinine Ratio 16.1 7.0 - 25.0    Anion Gap 12.2 mmol/L   Troponin    Collection Time: 07/14/18  5:35 PM   Result Value Ref Range    Troponin T <0.010 0.000 - 0.030 ng/mL   Protime-INR    Collection Time: 07/14/18  5:35 PM   Result Value Ref Range    Protime 13.1 11.7 - 14.2 Seconds    INR 1.01 0.90 - 1.10   Light Blue Top    Collection Time: 07/14/18  5:35 PM   Result Value Ref Range    Extra Tube hold for add-on    Green Top (Gel)    Collection Time: 07/14/18  5:35 PM   Result Value Ref Range    Extra Tube Hold for add-ons.    Lavender Top    Collection Time: 07/14/18  5:35 PM   Result Value Ref Range    Extra Tube hold for add-on    Gold Top - SST    Collection Time: 07/14/18  5:35 PM   Result Value Ref Range    Extra Tube Hold for add-ons.    CBC Auto Differential    Collection Time: 07/14/18  5:35 PM   Result Value Ref Range    WBC 4.74 4.50 - 10.70 10*3/mm3    RBC 4.22 3.90 - 5.20 10*6/mm3    Hemoglobin 12.4 11.9 - 15.5 g/dL    Hematocrit 39.5 35.6 - 45.5 %    MCV 93.6 80.5 - 98.2 fL    MCH 29.4 26.9 - 32.0 pg    MCHC 31.4 (L) 32.4 - 36.3 g/dL    RDW 13.4 (H) 11.7 - 13.0 %    RDW-SD 45.5 37.0 - 54.0 fl    MPV 9.8 6.0 - 12.0 fL    Platelets 238 140 - 500 10*3/mm3    Neutrophil % 36.6 (L) 42.7 - 76.0 %    Lymphocyte % 50.6 (H) 19.6 - 45.3 %    Monocyte % 9.3 5.0 - 12.0 %    Eosinophil % 2.7 0.3 - 6.2 %    Basophil % 0.8 0.0 - 1.5 %    Immature Grans % 0.0 0.0 - 0.5 %    Neutrophils, Absolute 1.73  (L) 1.90 - 8.10 10*3/mm3    Lymphocytes, Absolute 2.40 0.90 - 4.80 10*3/mm3    Monocytes, Absolute 0.44 0.20 - 1.20 10*3/mm3    Eosinophils, Absolute 0.13 0.00 - 0.70 10*3/mm3    Basophils, Absolute 0.04 0.00 - 0.20 10*3/mm3    Immature Grans, Absolute 0.00 0.00 - 0.03 10*3/mm3       Ordered the above labs and reviewed the results.        RADIOLOGY  XR Chest 2 View (Preliminary result)   Result time 07/14/18 17:40:52 (independently viewed by me, interpreted by radiologist)    Preliminary result by Leroy Torres MD (07/14/18 17:40:52)                Impression:    Two small areas of increased density in the right upper  lung as described. Suggest correlation to any recent outside chest  radiographs or CT scans of the chest. If none are available, consider  follow-up CT of the chest with contrast for further evaluation.               Narrative:    PA AND LATERAL CHEST 07/14/2018      HISTORY: Chest pain.     Heart size is within normal limits. There is a small ill-defined nodular  density in the right apex with another small area of increased density  in the lateral aspect of the right upper lobe.     These areas of increased density are not seen on the previous chest  radiograph of 07/06/2005. Lungs are otherwise clear.     Bony structures appear unremarkable.                     Ordered the above noted radiological studies. Reviewed by me in PACS.            PROCEDURES  Procedures        EKG:           EKG time: 1627  Rhythm/Rate: sinus rhythm, rate= 60s  P waves and AZ: normal  QRS, axis: normal   ST and T waves: normal     Interpreted Contemporaneously by me, independently viewed  No previous EKG for comparison      HEART SCORE    History  Highly suspicious              2    Moderately suspicious             1    Slightly or non-suspicious             0    ECG  Significant ST depression              2    Nonspecific repol disturbance            1    Normal               0    Age  > or = 65                           2     46-65                           1    < or = 45                          0    Risk factors (hypercholesterolemia, HTN, DM, smoking, pos fam hx, obesity)                            > or = to 3 RF for atherosclerotic dx   2    1 or 2                 1    No risk factors                0    Troponin > or = 3x normal limit               2    1-3x normal limit    1    < or = Normal limit    0    Score  0 - 3 is low risk    This patient's HEART score is 2.       PROGRESS AND CONSULTS  ED Course as of Jul 14 1940   Sat Jul 14, 2018   1655 Reports a one-week history of intermittent dull chest pain associated with left arm dull pain as well.  Spontaneously comes and goes.  Lasts from minutes to hours.  Pain currently 4/10. Denies nausea, vomiting, abdominal pain.  Had symptoms similar to this greater than 10 years ago for a severe anxiety attack.  Exam-no distress, lungs clear, regular rate and rhythm, abdomen benign, no lower extremity edema.  [KA]      ED Course User Index  [KA] NITESH Hutchinson     1850- CXR, blood work, troponin, PT with INR, and EKG have already been ordered for further evaluation and 325mg ASA was ordered per chest pain protocol.     1945- Rechecked pt. She is resting comfortably and is in no acute distress. Discussed with pt about all pertinent results including CXR findings (2 areas of increased density in right upper lung), normal EKG findings, negative troponin, low HEART score of 2, and otherwise stable labs. Informed pt that there could possibly be a musculoskeletal component to her chest pain as she has chest wall tenderness on exam. Educated pt on the importance of quitting smoking. Counseled pt on the importance of following up closely with PMD for recheck of condition and for further management of right upper lung densities due to possibility of abnormal growth of cells like cancer (recommended outpatient CT chest/correlation to any recent outside chest radiographs) and with  referred cardiologist for further testing/treatment as needed of atypical chest pain. Instructed pt to start taking 81mg ASA daily. RTER warnings given. Pt voices understanding of need to follow closely for further evaluation of her abnormal xray chest findings and her CP and agrees with plan. Addressed all questions.          MEDICAL DECISION MAKING      MDM  Number of Diagnoses or Management Options     Amount and/or Complexity of Data Reviewed  Clinical lab tests: ordered and reviewed (WBC= 4.74, troponin <0.01)  Tests in the radiology section of CPT®: reviewed and ordered (CXR- two small areas of increased density in the right upper lung )  Tests in the medicine section of CPT®: reviewed and ordered (EKG)  Independent visualization of images, tracings, or specimens: yes    Patient Progress  Patient progress: stable             DIAGNOSIS  Final diagnoses:   Atypical chest pain   Nodule of right lung         DISPOSITION  DISCHARGE    Patient discharged in stable condition.    Reviewed implications of results, diagnosis, meds, responsibility to follow up, warning signs and symptoms of possible worsening, potential complications and reasons to return to ER.    Patient/Family voiced understanding of above instructions.    Discussed plan for discharge, as there is no emergent indication for admission.  Pt/family is agreeable and understands need for follow up and repeat testing.  Pt is aware that discharge does not mean that nothing is wrong but it indicates no emergency is present and they must continue care with follow-up as given below or physician of their choice.     FOLLOW-UP  [facility] CARDIOLOGY  [address]  [place] [facility] [address] [phone]  Schedule an appointment as soon as possible for a visit in 3 days  EVEN IF WELL    Junior Rios MD  [address] [address]  [address]  [facility] [id number] [phone]    Schedule an appointment as soon as possible for  a visit in 3 days  EVEN IF WELL          Latest Documented Vital Signs:  As of 7:40 PM  BP- 132/74 HR- 53 Temp- 98.1 °F (36.7 °C) (Tympanic) O2 sat- 96%        --  Documentation assistance provided by unruly Wood for Dr. Eugene MD.  Information recorded by the scribe was done at my direction and has been verified and validated by me.       Arianna Wood  07/14/18 1948       Fely Knight MD  07/15/18 2046

## 2018-07-14 NOTE — DISCHARGE INSTRUCTIONS
You are advised to follow closely with Dr Rios and Em cardiology or cardiologist of your choice in 2-3 days for recheck, follow up of Right lung nodules, final results of lab work and imaging testing, and further testing/treatment as needed.  Please consider quitting smoking    Please return to the emergency department immediately with chest pain different than usual for you, shortness of air, abdominal pain, persistent vomiting/fever, blood in emesis or stool, lightheadedness/fainting, problems with speech, one sided weakness/numbness, new incontinence, problems with vision,  or for worsening of symptoms or other concerns.

## 2018-07-18 ENCOUNTER — TELEPHONE (OUTPATIENT)
Dept: SOCIAL WORK | Facility: HOSPITAL | Age: 53
End: 2018-07-18

## 2018-07-27 ENCOUNTER — OFFICE VISIT (OUTPATIENT)
Dept: INTERNAL MEDICINE | Facility: CLINIC | Age: 53
End: 2018-07-27

## 2018-07-27 VITALS
WEIGHT: 193.7 LBS | BODY MASS INDEX: 27.73 KG/M2 | RESPIRATION RATE: 18 BRPM | HEART RATE: 70 BPM | SYSTOLIC BLOOD PRESSURE: 155 MMHG | DIASTOLIC BLOOD PRESSURE: 91 MMHG | OXYGEN SATURATION: 100 % | HEIGHT: 70 IN

## 2018-07-27 DIAGNOSIS — R91.1 LUNG NODULE: Primary | ICD-10-CM

## 2018-07-27 DIAGNOSIS — F41.9 ANXIETY: ICD-10-CM

## 2018-07-27 DIAGNOSIS — Z12.31 SCREENING MAMMOGRAM, ENCOUNTER FOR: Primary | ICD-10-CM

## 2018-07-27 DIAGNOSIS — M79.10 MUSCLE ACHE: ICD-10-CM

## 2018-07-27 DIAGNOSIS — Z12.39 BREAST CANCER SCREENING: ICD-10-CM

## 2018-07-27 PROCEDURE — 99214 OFFICE O/P EST MOD 30 MIN: CPT | Performed by: FAMILY MEDICINE

## 2018-07-27 RX ORDER — VENLAFAXINE HYDROCHLORIDE 75 MG/1
75 CAPSULE, EXTENDED RELEASE ORAL DAILY
Qty: 30 CAPSULE | Refills: 6 | Status: SHIPPED | OUTPATIENT
Start: 2018-07-27 | End: 2019-02-15

## 2018-07-27 NOTE — PROGRESS NOTES
Kalpana Stringer is a 53 y.o. female.      Assessment/Plan   Problem List Items Addressed This Visit        Respiratory    Lung nodule - Primary    Overview     CXR 7/2018         Relevant Orders    CT Chest With Contrast       Nervous and Auditory    Muscle ache       Other    Anxiety    Relevant Medications    venlafaxine XR (EFFEXOR XR) 75 MG 24 hr capsule      Other Visit Diagnoses     Breast cancer screening        Relevant Orders    Mammo Diagnostic Bilateral With CAD           Results of mammogram as well as in one month benefit of initiating venlafaxine for anxiety she continues stretching exercises for her midthoracic paraspinous muscle tenderness thought to be secondary to work and lifting recommended off for work for 2 weeks until further workup is obtained for lung nodule and improvement of her anxiety she'll follow-up in one month      Chief Complaint   Patient presents with   • Follow-up     recent ER for chest pain still having pain , lung nodule (needs chest CT ordered)   • Anxiety     stress     Social History   Substance Use Topics   • Smoking status: Current Some Day Smoker     Types: Cigarettes   • Smokeless tobacco: Current User   • Alcohol use Yes      Comment: Socially       History of Present Illness   Patient comes in for follow-up after abnormal chest x-ray revealing suspicion of nodule in the right upper lung field she is history of smoking she does have any symptoms she's been gaining a little bit of weight she works 70-80 hours a week at [employer] has difficult a time getting time off she has not been off work for last 30 days she is anxious and worried to his poor sleep and cannot exercise or alleviate any of her stress through stress reduction techniques that are non-pharmacologic she was at inflammatories intermittently for muscle skeletal pain has not had a mammogram for quite some time  The following portions of the patient's history were reviewed and updated as  "appropriate:PMHroutine: Social history , Past Medical History, Surgical history , Allergies, Current Medications, Active Problem List, Family History and Health Maintenance    Review of Systems   Constitutional: Negative.    HENT: Negative.    Respiratory: Negative.    Cardiovascular: Negative.    Gastrointestinal: Negative.    Genitourinary: Negative.    Musculoskeletal: Positive for back pain.   Neurological: Negative.    Hematological: Negative.    Psychiatric/Behavioral: Positive for agitation and sleep disturbance. The patient is nervous/anxious.        Objective   Vitals:    07/27/18 1058   BP: 155/91   BP Location: Left arm   Patient Position: Sitting   Cuff Size: Large Adult   Pulse: 70   Resp: 18   SpO2: 100%   Weight: 87.9 kg (193 lb 11.2 oz)   Height: 177.8 cm (70\")     Body mass index is 27.79 kg/m².  Physical Exam   Constitutional: She is oriented to person, place, and time. She appears well-developed and well-nourished. No distress.   HENT:   Head: Normocephalic and atraumatic.   Eyes: Pupils are equal, round, and reactive to light. Conjunctivae and EOM are normal. Right eye exhibits no discharge. Left eye exhibits no discharge. No scleral icterus.   Neck: Normal range of motion. Neck supple. No tracheal deviation present. No thyromegaly present.   Cardiovascular: Normal rate, regular rhythm, normal heart sounds, intact distal pulses and normal pulses.  Exam reveals no gallop.    No murmur heard.  Pulmonary/Chest: Effort normal and breath sounds normal. No respiratory distress. She has no wheezes. She has no rales.   Musculoskeletal: Normal range of motion. She exhibits no edema.   Neurological: She is alert and oriented to person, place, and time. She exhibits normal muscle tone. Coordination normal.   Skin: Skin is warm. No rash noted. No erythema. No pallor.   Psychiatric: She has a normal mood and affect. Her behavior is normal. Judgment and thought content normal.   Nursing note and vitals " reviewed.    Reviewed Data:  Admission on 07/14/2018, Discharged on 07/14/2018   Component Date Value Ref Range Status   • Glucose 07/14/2018 83  65 - 99 mg/dL Final   • BUN 07/14/2018 14  6 - 20 mg/dL Final   • Creatinine 07/14/2018 0.87  0.57 - 1.00 mg/dL Final   • Sodium 07/14/2018 140  136 - 145 mmol/L Final   • Potassium 07/14/2018 3.8  3.5 - 5.2 mmol/L Final   • Chloride 07/14/2018 103  98 - 107 mmol/L Final   • CO2 07/14/2018 24.8  22.0 - 29.0 mmol/L Final   • Calcium 07/14/2018 8.8  8.6 - 10.5 mg/dL Final   • Total Protein 07/14/2018 7.0  6.0 - 8.5 g/dL Final   • Albumin 07/14/2018 4.30  3.50 - 5.20 g/dL Final   • ALT (SGPT) 07/14/2018 38* 1 - 33 U/L Final   • AST (SGOT) 07/14/2018 23  1 - 32 U/L Final   • Alkaline Phosphatase 07/14/2018 64  39 - 117 U/L Final   • Total Bilirubin 07/14/2018 0.3  0.1 - 1.2 mg/dL Final   • eGFR Non African Amer 07/14/2018 69  >60 mL/min/1.73 Final   • Globulin 07/14/2018 2.7  gm/dL Final   • A/G Ratio 07/14/2018 1.6  g/dL Final   • BUN/Creatinine Ratio 07/14/2018 16.1  7.0 - 25.0 Final   • Anion Gap 07/14/2018 12.2  mmol/L Final   • Troponin T 07/14/2018 <0.010  0.000 - 0.030 ng/mL Final   • Protime 07/14/2018 13.1  11.7 - 14.2 Seconds Final   • INR 07/14/2018 1.01  0.90 - 1.10 Final   • Extra Tube 07/14/2018 hold for add-on   Final    Auto resulted   • Extra Tube 07/14/2018 Hold for add-ons.   Final    Auto resulted.   • Extra Tube 07/14/2018 hold for add-on   Final    Auto resulted   • Extra Tube 07/14/2018 Hold for add-ons.   Final    Auto resulted.   • WBC 07/14/2018 4.74  4.50 - 10.70 10*3/mm3 Final   • RBC 07/14/2018 4.22  3.90 - 5.20 10*6/mm3 Final   • Hemoglobin 07/14/2018 12.4  11.9 - 15.5 g/dL Final   • Hematocrit 07/14/2018 39.5  35.6 - 45.5 % Final   • MCV 07/14/2018 93.6  80.5 - 98.2 fL Final   • MCH 07/14/2018 29.4  26.9 - 32.0 pg Final   • MCHC 07/14/2018 31.4* 32.4 - 36.3 g/dL Final   • RDW 07/14/2018 13.4* 11.7 - 13.0 % Final   • RDW-SD 07/14/2018 45.5  37.0 -  54.0 fl Final   • MPV 07/14/2018 9.8  6.0 - 12.0 fL Final   • Platelets 07/14/2018 238  140 - 500 10*3/mm3 Final   • Neutrophil % 07/14/2018 36.6* 42.7 - 76.0 % Final   • Lymphocyte % 07/14/2018 50.6* 19.6 - 45.3 % Final   • Monocyte % 07/14/2018 9.3  5.0 - 12.0 % Final   • Eosinophil % 07/14/2018 2.7  0.3 - 6.2 % Final   • Basophil % 07/14/2018 0.8  0.0 - 1.5 % Final   • Immature Grans % 07/14/2018 0.0  0.0 - 0.5 % Final   • Neutrophils, Absolute 07/14/2018 1.73* 1.90 - 8.10 10*3/mm3 Final   • Lymphocytes, Absolute 07/14/2018 2.40  0.90 - 4.80 10*3/mm3 Final   • Monocytes, Absolute 07/14/2018 0.44  0.20 - 1.20 10*3/mm3 Final   • Eosinophils, Absolute 07/14/2018 0.13  0.00 - 0.70 10*3/mm3 Final   • Basophils, Absolute 07/14/2018 0.04  0.00 - 0.20 10*3/mm3 Final   • Immature Grans, Absolute 07/14/2018 0.00  0.00 - 0.03 10*3/mm3 Final

## 2018-08-08 ENCOUNTER — HOSPITAL ENCOUNTER (OUTPATIENT)
Dept: MAMMOGRAPHY | Facility: HOSPITAL | Age: 53
Discharge: HOME | End: 2018-08-08
Admitting: FAMILY MEDICINE

## 2018-08-08 DIAGNOSIS — Z12.31 SCREENING MAMMOGRAM, ENCOUNTER FOR: ICD-10-CM

## 2018-08-08 PROCEDURE — 77067 SCR MAMMO BI INCL CAD: CPT

## 2018-08-10 ENCOUNTER — OFFICE VISIT (OUTPATIENT)
Dept: INTERNAL MEDICINE | Facility: CLINIC | Age: 53
End: 2018-08-10

## 2018-08-10 VITALS
HEART RATE: 78 BPM | BODY MASS INDEX: 28.22 KG/M2 | RESPIRATION RATE: 18 BRPM | WEIGHT: 197.1 LBS | SYSTOLIC BLOOD PRESSURE: 111 MMHG | DIASTOLIC BLOOD PRESSURE: 82 MMHG | HEIGHT: 70 IN | OXYGEN SATURATION: 99 %

## 2018-08-10 DIAGNOSIS — F41.9 ANXIETY: Primary | ICD-10-CM

## 2018-08-10 DIAGNOSIS — R91.1 LUNG NODULE: ICD-10-CM

## 2018-08-10 PROCEDURE — 99214 OFFICE O/P EST MOD 30 MIN: CPT | Performed by: FAMILY MEDICINE

## 2018-08-10 NOTE — PROGRESS NOTES
"Kalpana Stringer is a 53 y.o. female.      Assessment/Plan   Problem List Items Addressed This Visit        Respiratory    Lung nodule    Overview     CXR 7/2018         Relevant Orders    Ambulatory Referral to Pulmonology       Other    Anxiety - Primary           25 minutes spent face-to-face with patient greater than 50% time discussing disease pathology in progress of anxiety as well as further abnormal chest CT      Chief Complaint   Patient presents with   • Follow-up     FMLA paperwork    • Follow-up     order of CT chest   • Follow-up     on effexor, feels very \"blah\"      Social History   Substance Use Topics   • Smoking status: Current Some Day Smoker     Types: Cigarettes   • Smokeless tobacco: Current User   • Alcohol use Yes      Comment: Socially       History of Present Illness   Recent chest CT reveals benign-appearing nodules although patient patient is a persistent smoker she does not have any shortness of breath or cough anxiety is slightly improved with her Effexor and she is receiving some good rest or shortness of breath she feels that reduced work schedule has helped her immeasurably and she is requesting that her work reduction schedule is to 8 hours shift 5 shifts a week,   This would allow her to have better concentration while she is at work  The following portions of the patient's history were reviewed and updated as appropriate:PMHroutine: Social history , Past Medical History, Allergies, Current Medications, Active Problem List, Family History and Health Maintenance    Review of Systems   Constitutional: Negative.    HENT: Negative.    Respiratory: Negative.    Cardiovascular: Negative.    Gastrointestinal: Negative.    Genitourinary: Negative.    Neurological: Negative.    Hematological: Negative.    Psychiatric/Behavioral: The patient is nervous/anxious.        Objective   Vitals:    08/10/18 1354   BP: 111/82   BP Location: Right arm   Patient Position: Sitting   Cuff Size: Large " "Adult   Pulse: 78   Resp: 18   SpO2: 99%   Weight: 89.4 kg (197 lb 1.6 oz)   Height: 177.8 cm (70\")     Body mass index is 28.28 kg/m².  Physical Exam   Constitutional: She is oriented to person, place, and time. She appears well-developed and well-nourished.   HENT:   Head: Normocephalic and atraumatic.   Cardiovascular: Normal rate.    Neurological: She is alert and oriented to person, place, and time.   Skin: Skin is warm and dry.   Psychiatric: She has a normal mood and affect. Her behavior is normal. Judgment and thought content normal.   Nursing note and vitals reviewed.    Reviewed Data:  Admission on 07/14/2018, Discharged on 07/14/2018   Component Date Value Ref Range Status   • Glucose 07/14/2018 83  65 - 99 mg/dL Final   • BUN 07/14/2018 14  6 - 20 mg/dL Final   • Creatinine 07/14/2018 0.87  0.57 - 1.00 mg/dL Final   • Sodium 07/14/2018 140  136 - 145 mmol/L Final   • Potassium 07/14/2018 3.8  3.5 - 5.2 mmol/L Final   • Chloride 07/14/2018 103  98 - 107 mmol/L Final   • CO2 07/14/2018 24.8  22.0 - 29.0 mmol/L Final   • Calcium 07/14/2018 8.8  8.6 - 10.5 mg/dL Final   • Total Protein 07/14/2018 7.0  6.0 - 8.5 g/dL Final   • Albumin 07/14/2018 4.30  3.50 - 5.20 g/dL Final   • ALT (SGPT) 07/14/2018 38* 1 - 33 U/L Final   • AST (SGOT) 07/14/2018 23  1 - 32 U/L Final   • Alkaline Phosphatase 07/14/2018 64  39 - 117 U/L Final   • Total Bilirubin 07/14/2018 0.3  0.1 - 1.2 mg/dL Final   • eGFR Non African Amer 07/14/2018 69  >60 mL/min/1.73 Final   • Globulin 07/14/2018 2.7  gm/dL Final   • A/G Ratio 07/14/2018 1.6  g/dL Final   • BUN/Creatinine Ratio 07/14/2018 16.1  7.0 - 25.0 Final   • Anion Gap 07/14/2018 12.2  mmol/L Final   • Troponin T 07/14/2018 <0.010  0.000 - 0.030 ng/mL Final   • Protime 07/14/2018 13.1  11.7 - 14.2 Seconds Final   • INR 07/14/2018 1.01  0.90 - 1.10 Final   • Extra Tube 07/14/2018 hold for add-on   Final    Auto resulted   • Extra Tube 07/14/2018 Hold for add-ons.   Final    Auto " resulted.   • Extra Tube 07/14/2018 hold for add-on   Final    Auto resulted   • Extra Tube 07/14/2018 Hold for add-ons.   Final    Auto resulted.   • WBC 07/14/2018 4.74  4.50 - 10.70 10*3/mm3 Final   • RBC 07/14/2018 4.22  3.90 - 5.20 10*6/mm3 Final   • Hemoglobin 07/14/2018 12.4  11.9 - 15.5 g/dL Final   • Hematocrit 07/14/2018 39.5  35.6 - 45.5 % Final   • MCV 07/14/2018 93.6  80.5 - 98.2 fL Final   • MCH 07/14/2018 29.4  26.9 - 32.0 pg Final   • MCHC 07/14/2018 31.4* 32.4 - 36.3 g/dL Final   • RDW 07/14/2018 13.4* 11.7 - 13.0 % Final   • RDW-SD 07/14/2018 45.5  37.0 - 54.0 fl Final   • MPV 07/14/2018 9.8  6.0 - 12.0 fL Final   • Platelets 07/14/2018 238  140 - 500 10*3/mm3 Final   • Neutrophil % 07/14/2018 36.6* 42.7 - 76.0 % Final   • Lymphocyte % 07/14/2018 50.6* 19.6 - 45.3 % Final   • Monocyte % 07/14/2018 9.3  5.0 - 12.0 % Final   • Eosinophil % 07/14/2018 2.7  0.3 - 6.2 % Final   • Basophil % 07/14/2018 0.8  0.0 - 1.5 % Final   • Immature Grans % 07/14/2018 0.0  0.0 - 0.5 % Final   • Neutrophils, Absolute 07/14/2018 1.73* 1.90 - 8.10 10*3/mm3 Final   • Lymphocytes, Absolute 07/14/2018 2.40  0.90 - 4.80 10*3/mm3 Final   • Monocytes, Absolute 07/14/2018 0.44  0.20 - 1.20 10*3/mm3 Final   • Eosinophils, Absolute 07/14/2018 0.13  0.00 - 0.70 10*3/mm3 Final   • Basophils, Absolute 07/14/2018 0.04  0.00 - 0.20 10*3/mm3 Final   • Immature Grans, Absolute 07/14/2018 0.00  0.00 - 0.03 10*3/mm3 Final

## 2018-08-21 RX ORDER — OMEPRAZOLE 40 MG/1
40 CAPSULE, DELAYED RELEASE ORAL DAILY
Qty: 30 CAPSULE | Refills: 0 | Status: SHIPPED | OUTPATIENT
Start: 2018-08-21 | End: 2018-12-17 | Stop reason: SDUPTHER

## 2018-12-17 RX ORDER — OMEPRAZOLE 40 MG/1
CAPSULE, DELAYED RELEASE ORAL
Qty: 30 CAPSULE | Refills: 0 | Status: SHIPPED | OUTPATIENT
Start: 2018-12-17 | End: 2019-02-15 | Stop reason: SDUPTHER

## 2019-02-07 RX ORDER — OMEPRAZOLE 40 MG/1
CAPSULE, DELAYED RELEASE ORAL
Qty: 30 CAPSULE | Refills: 0 | OUTPATIENT
Start: 2019-02-07

## 2019-02-15 ENCOUNTER — OFFICE VISIT (OUTPATIENT)
Dept: INTERNAL MEDICINE | Facility: CLINIC | Age: 54
End: 2019-02-15

## 2019-02-15 VITALS
OXYGEN SATURATION: 98 % | BODY MASS INDEX: 29.65 KG/M2 | HEIGHT: 70 IN | SYSTOLIC BLOOD PRESSURE: 126 MMHG | RESPIRATION RATE: 16 BRPM | WEIGHT: 207.1 LBS | DIASTOLIC BLOOD PRESSURE: 84 MMHG | HEART RATE: 75 BPM

## 2019-02-15 DIAGNOSIS — R91.1 LUNG NODULE: ICD-10-CM

## 2019-02-15 DIAGNOSIS — M19.90 ARTHRITIS: ICD-10-CM

## 2019-02-15 DIAGNOSIS — K21.9 GASTROESOPHAGEAL REFLUX DISEASE, ESOPHAGITIS PRESENCE NOT SPECIFIED: Primary | ICD-10-CM

## 2019-02-15 DIAGNOSIS — Z12.11 COLON CANCER SCREENING: ICD-10-CM

## 2019-02-15 DIAGNOSIS — F41.9 ANXIETY: ICD-10-CM

## 2019-02-15 DIAGNOSIS — E78.2 MIXED HYPERLIPIDEMIA: ICD-10-CM

## 2019-02-15 PROCEDURE — 99214 OFFICE O/P EST MOD 30 MIN: CPT | Performed by: FAMILY MEDICINE

## 2019-02-15 RX ORDER — BIOTIN 1 MG
1000 TABLET ORAL 3 TIMES DAILY
COMMUNITY
End: 2020-11-03

## 2019-02-15 RX ORDER — MULTIVITAMIN WITH IRON
1 TABLET ORAL 2 TIMES DAILY PRN
COMMUNITY

## 2019-02-15 RX ORDER — OMEPRAZOLE 40 MG/1
40 CAPSULE, DELAYED RELEASE ORAL DAILY
Qty: 90 CAPSULE | Refills: 3 | Status: SHIPPED | OUTPATIENT
Start: 2019-02-15 | End: 2020-06-01 | Stop reason: SDUPTHER

## 2019-02-15 RX ORDER — VENLAFAXINE HYDROCHLORIDE 75 MG/1
75 CAPSULE, EXTENDED RELEASE ORAL DAILY
Qty: 30 CAPSULE | Refills: 6 | Status: CANCELLED | OUTPATIENT
Start: 2019-02-15

## 2019-02-15 NOTE — PROGRESS NOTES
Kalpana Stringer is a 53 y.o. female.      Assessment/Plan   Problem List Items Addressed This Visit        Cardiovascular and Mediastinum    HLD (hyperlipidemia)    Relevant Orders    Lipid Panel    TSH    T4, Free       Respiratory    Lung nodule    Overview     CXR 7/2018  Followed  By roseline pulmonolgy            Digestive    Gastroesophageal reflux disease - Primary    Relevant Medications    omeprazole (priLOSEC) 40 MG capsule       Musculoskeletal and Integument    Arthritis    Relevant Orders    CBC & Differential       Other    Anxiety    Colon cancer screening    Relevant Orders    Ambulatory Referral For Screening Colonoscopy           Follow-up results of blood work if symptoms of neck discomfort persist recommend consult with ENT  Return in about 9 months (around 11/15/2019), or if symptoms worsen or fail to improve, for Recheck.      Chief Complaint   Patient presents with   • Follow-up     for anxiety. stopped medication   • Follow-up     for acid reflux     Social History     Tobacco Use   • Smoking status: Current Some Day Smoker     Types: Cigarettes   • Smokeless tobacco: Current User   Substance Use Topics   • Alcohol use: Yes     Comment: Socially   • Drug use: No       History of Present Illness   Patient follow-up appointment for chronic problems anxiety hyperlipidemia GERD arthritis anxiety colon cancer screening she is not taking the Effexor anymore because her anxiety is much improved after switching her job she has no family history of colon cancer and no symptoms which are amenable to: Dysfunction at this time she is trying to watch her diet but not having much success she is eating fast food at [employer] which is her new employer her lung nodule is being followed by pulmonology and she has no symptoms of cough she has occasional tenderness or difficulty with some intermittent pain in her left neck that does not radiate anywhere specifically and it does not have any specific pattern  "there is no difficulty in swallowing no ear pain or sore throat  The following portions of the patient's history were reviewed and updated as appropriate:Review of Historical Elements: Social history , Past Medical History, Surgical history , Allergies, Current Medications, Active Problem List and Health Maintenance    Review of Systems   Constitutional: Negative for activity change, appetite change and unexpected weight change.   HENT: Negative for nosebleeds and trouble swallowing.    Eyes: Negative for pain and visual disturbance.   Respiratory: Negative for chest tightness, shortness of breath and wheezing.    Cardiovascular: Negative for chest pain and palpitations.   Gastrointestinal: Negative for abdominal pain and blood in stool.   Endocrine: Negative.    Genitourinary: Negative for difficulty urinating and hematuria.   Musculoskeletal: Positive for neck pain. Negative for joint swelling.   Skin: Negative for color change and rash.   Allergic/Immunologic: Negative.    Neurological: Negative for syncope and speech difficulty.   Hematological: Negative for adenopathy.   Psychiatric/Behavioral: Negative for agitation and confusion. The patient is not nervous/anxious.    All other systems reviewed and are negative.      Objective   Vitals:    02/15/19 0947   BP: 126/84   BP Location: Left arm   Patient Position: Sitting   Cuff Size: Large Adult   Pulse: 75   Resp: 16   SpO2: 98%   Weight: 93.9 kg (207 lb 1.6 oz)   Height: 178.4 cm (70.25\")     Body mass index is 29.5 kg/m².  Physical Exam   Constitutional: She is oriented to person, place, and time. She appears well-developed and well-nourished. No distress.   HENT:   Head: Normocephalic and atraumatic.   Mouth/Throat: Oropharynx is clear and moist.   Eyes: Conjunctivae and EOM are normal. Pupils are equal, round, and reactive to light. Right eye exhibits no discharge. Left eye exhibits no discharge. No scleral icterus.   Neck: Normal range of motion. Neck " supple. No tracheal deviation present. No thyromegaly present.   Cardiovascular: Normal rate, regular rhythm, normal heart sounds, intact distal pulses and normal pulses. Exam reveals no gallop.   No murmur heard.  Pulmonary/Chest: Effort normal and breath sounds normal. No respiratory distress. She has no wheezes. She has no rales.   Musculoskeletal: Normal range of motion. She exhibits no edema.   Lymphadenopathy:     She has no cervical adenopathy.   Neurological: She is alert and oriented to person, place, and time. She exhibits normal muscle tone. Coordination normal.   Skin: Skin is warm. No rash noted. No erythema. No pallor.   Psychiatric: She has a normal mood and affect. Her behavior is normal. Judgment and thought content normal.   Nursing note and vitals reviewed.    Reviewed Data:  No visits with results within 1 Month(s) from this visit.   Latest known visit with results is:   Admission on 07/14/2018, Discharged on 07/14/2018   Component Date Value Ref Range Status   • Glucose 07/14/2018 83  65 - 99 mg/dL Final   • BUN 07/14/2018 14  6 - 20 mg/dL Final   • Creatinine 07/14/2018 0.87  0.57 - 1.00 mg/dL Final   • Sodium 07/14/2018 140  136 - 145 mmol/L Final   • Potassium 07/14/2018 3.8  3.5 - 5.2 mmol/L Final   • Chloride 07/14/2018 103  98 - 107 mmol/L Final   • CO2 07/14/2018 24.8  22.0 - 29.0 mmol/L Final   • Calcium 07/14/2018 8.8  8.6 - 10.5 mg/dL Final   • Total Protein 07/14/2018 7.0  6.0 - 8.5 g/dL Final   • Albumin 07/14/2018 4.30  3.50 - 5.20 g/dL Final   • ALT (SGPT) 07/14/2018 38* 1 - 33 U/L Final   • AST (SGOT) 07/14/2018 23  1 - 32 U/L Final   • Alkaline Phosphatase 07/14/2018 64  39 - 117 U/L Final   • Total Bilirubin 07/14/2018 0.3  0.1 - 1.2 mg/dL Final   • eGFR Non African Amer 07/14/2018 69  >60 mL/min/1.73 Final   • Globulin 07/14/2018 2.7  gm/dL Final   • A/G Ratio 07/14/2018 1.6  g/dL Final   • BUN/Creatinine Ratio 07/14/2018 16.1  7.0 - 25.0 Final   • Anion Gap 07/14/2018 12.2   mmol/L Final   • Troponin T 07/14/2018 <0.010  0.000 - 0.030 ng/mL Final   • Protime 07/14/2018 13.1  11.7 - 14.2 Seconds Final   • INR 07/14/2018 1.01  0.90 - 1.10 Final   • Extra Tube 07/14/2018 hold for add-on   Final    Auto resulted   • Extra Tube 07/14/2018 Hold for add-ons.   Final    Auto resulted.   • Extra Tube 07/14/2018 hold for add-on   Final    Auto resulted   • Extra Tube 07/14/2018 Hold for add-ons.   Final    Auto resulted.   • WBC 07/14/2018 4.74  4.50 - 10.70 10*3/mm3 Final   • RBC 07/14/2018 4.22  3.90 - 5.20 10*6/mm3 Final   • Hemoglobin 07/14/2018 12.4  11.9 - 15.5 g/dL Final   • Hematocrit 07/14/2018 39.5  35.6 - 45.5 % Final   • MCV 07/14/2018 93.6  80.5 - 98.2 fL Final   • MCH 07/14/2018 29.4  26.9 - 32.0 pg Final   • MCHC 07/14/2018 31.4* 32.4 - 36.3 g/dL Final   • RDW 07/14/2018 13.4* 11.7 - 13.0 % Final   • RDW-SD 07/14/2018 45.5  37.0 - 54.0 fl Final   • MPV 07/14/2018 9.8  6.0 - 12.0 fL Final   • Platelets 07/14/2018 238  140 - 500 10*3/mm3 Final   • Neutrophil % 07/14/2018 36.6* 42.7 - 76.0 % Final   • Lymphocyte % 07/14/2018 50.6* 19.6 - 45.3 % Final   • Monocyte % 07/14/2018 9.3  5.0 - 12.0 % Final   • Eosinophil % 07/14/2018 2.7  0.3 - 6.2 % Final   • Basophil % 07/14/2018 0.8  0.0 - 1.5 % Final   • Immature Grans % 07/14/2018 0.0  0.0 - 0.5 % Final   • Neutrophils, Absolute 07/14/2018 1.73* 1.90 - 8.10 10*3/mm3 Final   • Lymphocytes, Absolute 07/14/2018 2.40  0.90 - 4.80 10*3/mm3 Final   • Monocytes, Absolute 07/14/2018 0.44  0.20 - 1.20 10*3/mm3 Final   • Eosinophils, Absolute 07/14/2018 0.13  0.00 - 0.70 10*3/mm3 Final   • Basophils, Absolute 07/14/2018 0.04  0.00 - 0.20 10*3/mm3 Final   • Immature Grans, Absolute 07/14/2018 0.00  0.00 - 0.03 10*3/mm3 Final

## 2019-02-16 LAB
BASOPHILS # BLD AUTO: 0.1 X10E3/UL (ref 0–0.2)
BASOPHILS NFR BLD AUTO: 2 %
CHOLEST SERPL-MCNC: 227 MG/DL (ref 100–199)
EOSINOPHIL # BLD AUTO: 0.1 X10E3/UL (ref 0–0.4)
EOSINOPHIL NFR BLD AUTO: 2 %
ERYTHROCYTE [DISTWIDTH] IN BLOOD BY AUTOMATED COUNT: 14.2 % (ref 12.3–15.4)
HCT VFR BLD AUTO: 39.5 % (ref 34–46.6)
HDLC SERPL-MCNC: 54 MG/DL
HGB BLD-MCNC: 13.1 G/DL (ref 11.1–15.9)
IMM GRANULOCYTES # BLD AUTO: 0 X10E3/UL (ref 0–0.1)
IMM GRANULOCYTES NFR BLD AUTO: 0 %
LDLC SERPL CALC-MCNC: 139 MG/DL (ref 0–99)
LYMPHOCYTES # BLD AUTO: 1.9 X10E3/UL (ref 0.7–3.1)
LYMPHOCYTES NFR BLD AUTO: 41 %
MCH RBC QN AUTO: 28.7 PG (ref 26.6–33)
MCHC RBC AUTO-ENTMCNC: 33.2 G/DL (ref 31.5–35.7)
MCV RBC AUTO: 86 FL (ref 79–97)
MONOCYTES # BLD AUTO: 0.4 X10E3/UL (ref 0.1–0.9)
MONOCYTES NFR BLD AUTO: 9 %
NEUTROPHILS # BLD AUTO: 2.1 X10E3/UL (ref 1.4–7)
NEUTROPHILS NFR BLD AUTO: 46 %
PLATELET # BLD AUTO: 245 X10E3/UL (ref 150–379)
RBC # BLD AUTO: 4.57 X10E6/UL (ref 3.77–5.28)
T4 FREE SERPL-MCNC: 0.95 NG/DL (ref 0.82–1.77)
TRIGL SERPL-MCNC: 169 MG/DL (ref 0–149)
TSH SERPL DL<=0.005 MIU/L-ACNC: 2.04 UIU/ML (ref 0.45–4.5)
VLDLC SERPL CALC-MCNC: 34 MG/DL (ref 5–40)
WBC # BLD AUTO: 4.5 X10E3/UL (ref 3.4–10.8)

## 2019-02-20 ENCOUNTER — TELEPHONE (OUTPATIENT)
Dept: INTERNAL MEDICINE | Facility: CLINIC | Age: 54
End: 2019-02-20

## 2019-02-20 DIAGNOSIS — E78.2 ELEVATED CHOLESTEROL WITH ELEVATED TRIGLYCERIDES: Primary | ICD-10-CM

## 2019-02-20 NOTE — TELEPHONE ENCOUNTER
----- Message from Junior Rios MD sent at 2/18/2019  4:42 PM EST -----  Elevated cholesterol recommend low-cholesterol diet recheck fasting lipid profile in 3 months if persistent elevation recommend statin therapy

## 2019-02-20 NOTE — TELEPHONE ENCOUNTER
Patient notified and expressed understanding.  Patient will call back to schedule a 3 month fasting lab appointment.

## 2019-05-20 ENCOUNTER — RESULTS ENCOUNTER (OUTPATIENT)
Dept: INTERNAL MEDICINE | Facility: CLINIC | Age: 54
End: 2019-05-20

## 2019-05-20 DIAGNOSIS — E78.2 ELEVATED CHOLESTEROL WITH ELEVATED TRIGLYCERIDES: ICD-10-CM

## 2019-09-30 ENCOUNTER — OFFICE VISIT (OUTPATIENT)
Dept: INTERNAL MEDICINE | Facility: CLINIC | Age: 54
End: 2019-09-30

## 2019-09-30 ENCOUNTER — APPOINTMENT (OUTPATIENT)
Dept: LAB | Facility: HOSPITAL | Age: 54
End: 2019-09-30

## 2019-09-30 VITALS
HEART RATE: 62 BPM | HEIGHT: 70 IN | BODY MASS INDEX: 29.38 KG/M2 | DIASTOLIC BLOOD PRESSURE: 64 MMHG | SYSTOLIC BLOOD PRESSURE: 122 MMHG | OXYGEN SATURATION: 97 % | TEMPERATURE: 98.5 F | WEIGHT: 205.2 LBS

## 2019-09-30 DIAGNOSIS — K62.5 RECTAL BLEEDING: Primary | ICD-10-CM

## 2019-09-30 DIAGNOSIS — M19.90 ARTHRITIS: ICD-10-CM

## 2019-09-30 LAB
ALBUMIN SERPL-MCNC: 4.4 G/DL (ref 3.5–5.2)
ALBUMIN/GLOB SERPL: 1.6 G/DL
ALP SERPL-CCNC: 93 U/L (ref 39–117)
ALT SERPL W P-5'-P-CCNC: 24 U/L (ref 1–33)
ANION GAP SERPL CALCULATED.3IONS-SCNC: 11.4 MMOL/L (ref 5–15)
AST SERPL-CCNC: 21 U/L (ref 1–32)
BASOPHILS # BLD AUTO: 0.06 10*3/MM3 (ref 0–0.2)
BASOPHILS NFR BLD AUTO: 1.3 % (ref 0–1.5)
BILIRUB SERPL-MCNC: <0.2 MG/DL (ref 0.2–1.2)
BUN BLD-MCNC: 15 MG/DL (ref 6–20)
BUN/CREAT SERPL: 16.9 (ref 7–25)
CALCIUM SPEC-SCNC: 9.3 MG/DL (ref 8.6–10.5)
CHLORIDE SERPL-SCNC: 103 MMOL/L (ref 98–107)
CO2 SERPL-SCNC: 24.6 MMOL/L (ref 22–29)
CREAT BLD-MCNC: 0.89 MG/DL (ref 0.57–1)
DEPRECATED RDW RBC AUTO: 42.6 FL (ref 37–54)
EOSINOPHIL # BLD AUTO: 0.2 10*3/MM3 (ref 0–0.4)
EOSINOPHIL NFR BLD AUTO: 4.4 % (ref 0.3–6.2)
ERYTHROCYTE [DISTWIDTH] IN BLOOD BY AUTOMATED COUNT: 13.2 % (ref 12.3–15.4)
GFR SERPL CREATININE-BSD FRML MDRD: 67 ML/MIN/1.73
GLOBULIN UR ELPH-MCNC: 2.7 GM/DL
GLUCOSE BLD-MCNC: 97 MG/DL (ref 65–99)
HCT VFR BLD AUTO: 38.1 % (ref 34–46.6)
HGB BLD-MCNC: 12.6 G/DL (ref 12–15.9)
IMM GRANULOCYTES # BLD AUTO: 0.01 10*3/MM3 (ref 0–0.05)
IMM GRANULOCYTES NFR BLD AUTO: 0.2 % (ref 0–0.5)
LYMPHOCYTES # BLD AUTO: 1.77 10*3/MM3 (ref 0.7–3.1)
LYMPHOCYTES NFR BLD AUTO: 38.6 % (ref 19.6–45.3)
MCH RBC QN AUTO: 29.2 PG (ref 26.6–33)
MCHC RBC AUTO-ENTMCNC: 33.1 G/DL (ref 31.5–35.7)
MCV RBC AUTO: 88.2 FL (ref 79–97)
MONOCYTES # BLD AUTO: 0.52 10*3/MM3 (ref 0.1–0.9)
MONOCYTES NFR BLD AUTO: 11.4 % (ref 5–12)
NEUTROPHILS # BLD AUTO: 2.02 10*3/MM3 (ref 1.7–7)
NEUTROPHILS NFR BLD AUTO: 44.1 % (ref 42.7–76)
NRBC BLD AUTO-RTO: 0 /100 WBC (ref 0–0.2)
PLATELET # BLD AUTO: 264 10*3/MM3 (ref 140–450)
PMV BLD AUTO: 10.1 FL (ref 6–12)
POTASSIUM BLD-SCNC: 3.8 MMOL/L (ref 3.5–5.2)
PROT SERPL-MCNC: 7.1 G/DL (ref 6–8.5)
RBC # BLD AUTO: 4.32 10*6/MM3 (ref 3.77–5.28)
SODIUM BLD-SCNC: 139 MMOL/L (ref 136–145)
WBC NRBC COR # BLD: 4.58 10*3/MM3 (ref 3.4–10.8)

## 2019-09-30 PROCEDURE — 36415 COLL VENOUS BLD VENIPUNCTURE: CPT | Performed by: FAMILY MEDICINE

## 2019-09-30 PROCEDURE — 99214 OFFICE O/P EST MOD 30 MIN: CPT | Performed by: FAMILY MEDICINE

## 2019-09-30 PROCEDURE — 80053 COMPREHEN METABOLIC PANEL: CPT | Performed by: FAMILY MEDICINE

## 2019-09-30 PROCEDURE — 85025 COMPLETE CBC W/AUTO DIFF WBC: CPT | Performed by: FAMILY MEDICINE

## 2019-09-30 NOTE — PROGRESS NOTES
Kalpana Stringer is a 54 y.o. female.      Assessment/Plan   Problem List Items Addressed This Visit        Digestive    Rectal bleeding - Primary    Relevant Orders    CBC & Differential    Comprehensive Metabolic Panel    Ambulatory Referral to Gastroenterology       Musculoskeletal and Integument    Arthritis         Follow-up results of blood work for ongoing management of rectal bleeding and use of anti-inflammatories for arthritis low calorie diet for lipid control consult gastroenterology for recently developed rectal bleeding and colon cancer screening    Return in about 1 month (around 10/30/2019), or if symptoms worsen or fail to improve, for Recheck, Next scheduled follow up.      Chief Complaint   Patient presents with   • [facility] UC follow up to URI   • abdominal pain/bloating   • blood on tissue when wiping after bowel movement   • Fatigue     Social History     Tobacco Use   • Smoking status: Current Some Day Smoker     Types: Cigarettes   • Smokeless tobacco: Current User   Substance Use Topics   • Alcohol use: Yes     Comment: Socially   • Drug use: No       History of Present Illness   Problems hyperlipidemia GERD has had some rectal bleeding on the paper has not seen the stool she never had a colon cancer screening she moves her bowels usually every other day or so she is developed some bilateral lower quadrant abdominal pain is no other vaginal bleeding and she feels it does not really change much with her movements her GERD is well controlled he feels that the abdominal discomfort predated the use of Augmentin for recent URI she takes anti-inflammatories intermittently as well as proton pump inhibitors  The following portions of the patient's history were reviewed and updated as appropriate:PMHroutine: Social history , Allergies, Current Medications, Active Problem List and Health Maintenance    Review of Systems   HENT: Negative.    Cardiovascular: Negative.    Genitourinary:  "Negative.    Psychiatric/Behavioral: Negative.        Objective   Vitals:    09/30/19 1522   BP: 122/64   BP Location: Right arm   Patient Position: Sitting   Cuff Size: Adult   Pulse: 62   Temp: 98.5 °F (36.9 °C)   TempSrc: Oral   SpO2: 97%   Weight: 93.1 kg (205 lb 3.2 oz)   Height: 178.4 cm (70.25\")     Body mass index is 29.23 kg/m².  Physical Exam   Constitutional: She is oriented to person, place, and time. She appears well-developed and well-nourished. No distress.   HENT:   Head: Normocephalic and atraumatic.   Eyes: Conjunctivae and EOM are normal. Pupils are equal, round, and reactive to light. Right eye exhibits no discharge. Left eye exhibits no discharge. No scleral icterus.   Neck: Normal range of motion. Neck supple. No tracheal deviation present. No thyromegaly present.   Cardiovascular: Normal rate, regular rhythm, normal heart sounds, intact distal pulses and normal pulses. Exam reveals no gallop.   No murmur heard.  Pulmonary/Chest: Effort normal and breath sounds normal. No respiratory distress. She has no wheezes. She has no rales.   Abdominal: Soft. Bowel sounds are normal. There is no tenderness.   Musculoskeletal: Normal range of motion. She exhibits no edema.   Neurological: She is alert and oriented to person, place, and time. She exhibits normal muscle tone. Coordination normal.   Skin: Skin is warm. No rash noted. No erythema. No pallor.   Psychiatric: She has a normal mood and affect. Her behavior is normal. Judgment and thought content normal.   Nursing note and vitals reviewed.    Reviewed Data:  No visits with results within 1 Month(s) from this visit.   Latest known visit with results is:   Office Visit on 02/15/2019   Component Date Value Ref Range Status   • Total Cholesterol 02/15/2019 227* 100 - 199 mg/dL Final   • Triglycerides 02/15/2019 169* 0 - 149 mg/dL Final   • HDL Cholesterol 02/15/2019 54  >39 mg/dL Final   • VLDL Cholesterol 02/15/2019 34  5 - 40 mg/dL Final   • LDL " Cholesterol  02/15/2019 139* 0 - 99 mg/dL Final   • TSH 02/15/2019 2.040  0.450 - 4.500 uIU/mL Final   • Free T4 02/15/2019 0.95  0.82 - 1.77 ng/dL Final   • WBC 02/15/2019 4.5  3.4 - 10.8 x10E3/uL Final   • RBC 02/15/2019 4.57  3.77 - 5.28 x10E6/uL Final   • Hemoglobin 02/15/2019 13.1  11.1 - 15.9 g/dL Final   • Hematocrit 02/15/2019 39.5  34.0 - 46.6 % Final   • MCV 02/15/2019 86  79 - 97 fL Final   • MCH 02/15/2019 28.7  26.6 - 33.0 pg Final   • MCHC 02/15/2019 33.2  31.5 - 35.7 g/dL Final   • RDW 02/15/2019 14.2  12.3 - 15.4 % Final   • Platelets 02/15/2019 245  150 - 379 x10E3/uL Final   • Neutrophil Rel % 02/15/2019 46  Not Estab. % Final   • Lymphocyte Rel % 02/15/2019 41  Not Estab. % Final   • Monocyte Rel % 02/15/2019 9  Not Estab. % Final   • Eosinophil Rel % 02/15/2019 2  Not Estab. % Final   • Basophil Rel % 02/15/2019 2  Not Estab. % Final   • Neutrophils Absolute 02/15/2019 2.1  1.4 - 7.0 x10E3/uL Final   • Lymphocytes Absolute 02/15/2019 1.9  0.7 - 3.1 x10E3/uL Final   • Monocytes Absolute 02/15/2019 0.4  0.1 - 0.9 x10E3/uL Final   • Eosinophils Absolute 02/15/2019 0.1  0.0 - 0.4 x10E3/uL Final   • Basophils Absolute 02/15/2019 0.1  0.0 - 0.2 x10E3/uL Final   • Immature Granulocyte Rel % 02/15/2019 0  Not Estab. % Final   • Immature Grans Absolute 02/15/2019 0.0  0.0 - 0.1 x10E3/uL Final

## 2020-05-21 RX ORDER — OMEPRAZOLE 40 MG/1
CAPSULE, DELAYED RELEASE ORAL
Qty: 90 CAPSULE | Refills: 0 | OUTPATIENT
Start: 2020-05-21

## 2020-05-28 ENCOUNTER — TELEPHONE (OUTPATIENT)
Dept: INTERNAL MEDICINE | Facility: CLINIC | Age: 55
End: 2020-05-28

## 2020-05-28 RX ORDER — CYCLOBENZAPRINE HCL 10 MG
10 TABLET ORAL 3 TIMES DAILY PRN
Qty: 30 TABLET | Refills: 0 | Status: SHIPPED | OUTPATIENT
Start: 2020-05-28 | End: 2020-06-01 | Stop reason: SDUPTHER

## 2020-05-28 NOTE — TELEPHONE ENCOUNTER
Recommend Tylenol and Flexeril prescription sent to pharmacy for the Flexeril Tylenol 2 extra strength every 8 hours

## 2020-05-28 NOTE — TELEPHONE ENCOUNTER
Patient called and stated she is having severe back pain. Patient has appointment Monday, but is requesting something for the pain  to be sent to      [place] [id number] [facility], [place] - [address] [facility] [phone] [facility] [phone] FX       Please advise   [phone]

## 2020-06-01 ENCOUNTER — OFFICE VISIT (OUTPATIENT)
Dept: INTERNAL MEDICINE | Facility: CLINIC | Age: 55
End: 2020-06-01

## 2020-06-01 VITALS
HEIGHT: 70 IN | TEMPERATURE: 97.4 F | HEART RATE: 72 BPM | DIASTOLIC BLOOD PRESSURE: 70 MMHG | SYSTOLIC BLOOD PRESSURE: 128 MMHG | BODY MASS INDEX: 30.72 KG/M2 | OXYGEN SATURATION: 99 % | WEIGHT: 214.6 LBS

## 2020-06-01 DIAGNOSIS — L98.9 SKIN LESIONS: ICD-10-CM

## 2020-06-01 DIAGNOSIS — K21.9 GASTROESOPHAGEAL REFLUX DISEASE, ESOPHAGITIS PRESENCE NOT SPECIFIED: ICD-10-CM

## 2020-06-01 DIAGNOSIS — E78.2 MIXED HYPERLIPIDEMIA: Primary | ICD-10-CM

## 2020-06-01 PROBLEM — M54.50 CHRONIC BILATERAL LOW BACK PAIN WITHOUT SCIATICA: Status: ACTIVE | Noted: 2020-06-01

## 2020-06-01 PROBLEM — G89.29 CHRONIC BILATERAL LOW BACK PAIN WITHOUT SCIATICA: Status: ACTIVE | Noted: 2020-06-01

## 2020-06-01 PROCEDURE — 99214 OFFICE O/P EST MOD 30 MIN: CPT | Performed by: FAMILY MEDICINE

## 2020-06-01 RX ORDER — ASPIRIN 81 MG/1
81 TABLET ORAL DAILY
COMMUNITY
End: 2022-07-19

## 2020-06-01 RX ORDER — OMEPRAZOLE 40 MG/1
40 CAPSULE, DELAYED RELEASE ORAL DAILY
Qty: 90 CAPSULE | Refills: 3 | Status: SHIPPED | OUTPATIENT
Start: 2020-06-01 | End: 2021-07-30

## 2020-06-01 RX ORDER — TIZANIDINE HYDROCHLORIDE 2 MG/1
4 CAPSULE, GELATIN COATED ORAL 3 TIMES DAILY PRN
Qty: 90 CAPSULE | Refills: 1 | Status: SHIPPED | OUTPATIENT
Start: 2020-06-01 | End: 2021-07-30

## 2020-06-01 RX ORDER — ACETAMINOPHEN, ASPIRIN AND CAFFEINE 250; 250; 65 MG/1; MG/1; MG/1
1 TABLET, FILM COATED ORAL EVERY 6 HOURS PRN
COMMUNITY

## 2020-06-01 NOTE — PROGRESS NOTES
Kalpana Stringer is a 55 y.o. female.      Assessment/Plan   Problem List Items Addressed This Visit        Cardiovascular and Mediastinum    HLD (hyperlipidemia) - Primary    Relevant Orders    Lipid Panel       Digestive    Gastroesophageal reflux disease    Relevant Medications    omeprazole (priLOSEC) 40 MG capsule       Musculoskeletal and Integument    Skin lesions    Relevant Orders    Ambulatory Referral to Dermatology         Trial of tizanidine for low back pain omeprazole for GERD consult GI consult dermatology    Return in about 6 months (around 12/1/2020), or if symptoms worsen or fail to improve, for Recheck.      Chief Complaint   Patient presents with   • follow up to GERD   • follow up to low back/hip pain     Social History     Tobacco Use   • Smoking status: Former Smoker     Types: Cigarettes   • Smokeless tobacco: Never Used   Substance Use Topics   • Alcohol use: Yes     Comment: Socially   • Drug use: No       History of Present Illness   For chronic problems of bilateral low back pain improved with muscle relaxant GERD improved with omeprazole was treated for H. pylori in the past persistent symptoms with no GI follow-up she also has some nonspecific various skin lesions that she like consultation with dermatology  The following portions of the patient's history were reviewed and updated as appropriate:PMHroutine: Social history , Allergies, Current Medications, Active Problem List and Health Maintenance    Review of Systems   Constitutional: Negative for appetite change, fever and unexpected weight change.   HENT: Negative.    Eyes: Negative for pain and visual disturbance.   Respiratory: Negative for chest tightness, shortness of breath and wheezing.    Cardiovascular: Negative for chest pain and palpitations.   Gastrointestinal: Negative for abdominal pain, blood in stool, diarrhea, nausea and vomiting.   Endocrine: Negative.    Genitourinary: Negative for difficulty urinating, flank pain,  "frequency and urgency.   Musculoskeletal: Positive for back pain. Negative for joint swelling.   Skin: Negative for color change and rash.   Neurological: Negative for tremors and weakness.   Hematological: Negative for adenopathy.   Psychiatric/Behavioral: Negative for confusion and decreased concentration.   All other systems reviewed and are negative.      Objective   Vitals:    06/01/20 1516   BP: 128/70   BP Location: Right arm   Patient Position: Sitting   Cuff Size: Adult   Pulse: 72   Temp: 97.4 °F (36.3 °C)   TempSrc: Temporal   SpO2: 99%   Weight: 97.3 kg (214 lb 9.6 oz)   Height: 178.4 cm (70.25\")     Body mass index is 30.57 kg/m².  Physical Exam   Constitutional: She is oriented to person, place, and time. She appears well-developed and well-nourished. No distress.   HENT:   Head: Normocephalic and atraumatic.   Eyes: Pupils are equal, round, and reactive to light. Conjunctivae and EOM are normal. No scleral icterus.   Neck: Normal range of motion. Neck supple.   Cardiovascular: Normal rate, regular rhythm and normal heart sounds. Exam reveals no gallop.   No murmur heard.  Pulmonary/Chest: Effort normal and breath sounds normal. No respiratory distress. She has no wheezes. She has no rales. She exhibits no tenderness.   Abdominal: Soft. There is no tenderness.   Musculoskeletal: She exhibits tenderness. She exhibits no deformity.   Neurological: She is alert and oriented to person, place, and time. She has normal reflexes. She displays no atrophy, no tremor and normal reflexes. No sensory deficit. She exhibits normal muscle tone. Coordination normal.   Reflex Scores:       Patellar reflexes are 2+ on the right side and 2+ on the left side.       Achilles reflexes are 2+ on the right side and 2+ on the left side.  Skin: Skin is warm and dry. No rash noted. She is not diaphoretic.   There is nonspecific flesh-colored skin lesions torso and arms   Psychiatric: She has a normal mood and affect. Her " behavior is normal. Judgment and thought content normal.   Nursing note and vitals reviewed.    Reviewed Data:  No visits with results within 1 Month(s) from this visit.   Latest known visit with results is:   Office Visit on 09/30/2019   Component Date Value Ref Range Status   • Glucose 09/30/2019 97  65 - 99 mg/dL Final   • BUN 09/30/2019 15  6 - 20 mg/dL Final   • Creatinine 09/30/2019 0.89  0.57 - 1.00 mg/dL Final   • Sodium 09/30/2019 139  136 - 145 mmol/L Final   • Potassium 09/30/2019 3.8  3.5 - 5.2 mmol/L Final   • Chloride 09/30/2019 103  98 - 107 mmol/L Final   • CO2 09/30/2019 24.6  22.0 - 29.0 mmol/L Final   • Calcium 09/30/2019 9.3  8.6 - 10.5 mg/dL Final   • Total Protein 09/30/2019 7.1  6.0 - 8.5 g/dL Final   • Albumin 09/30/2019 4.40  3.50 - 5.20 g/dL Final   • ALT (SGPT) 09/30/2019 24  1 - 33 U/L Final   • AST (SGOT) 09/30/2019 21  1 - 32 U/L Final   • Alkaline Phosphatase 09/30/2019 93  39 - 117 U/L Final   • Total Bilirubin 09/30/2019 <0.2* 0.2 - 1.2 mg/dL Final   • eGFR Non African Amer 09/30/2019 67  >60 mL/min/1.73 Final   • Globulin 09/30/2019 2.7  gm/dL Final   • A/G Ratio 09/30/2019 1.6  g/dL Final   • BUN/Creatinine Ratio 09/30/2019 16.9  7.0 - 25.0 Final   • Anion Gap 09/30/2019 11.4  5.0 - 15.0 mmol/L Final   • WBC 09/30/2019 4.58  3.40 - 10.80 10*3/mm3 Final   • RBC 09/30/2019 4.32  3.77 - 5.28 10*6/mm3 Final   • Hemoglobin 09/30/2019 12.6  12.0 - 15.9 g/dL Final   • Hematocrit 09/30/2019 38.1  34.0 - 46.6 % Final   • MCV 09/30/2019 88.2  79.0 - 97.0 fL Final   • MCH 09/30/2019 29.2  26.6 - 33.0 pg Final   • MCHC 09/30/2019 33.1  31.5 - 35.7 g/dL Final   • RDW 09/30/2019 13.2  12.3 - 15.4 % Final   • RDW-SD 09/30/2019 42.6  37.0 - 54.0 fl Final   • MPV 09/30/2019 10.1  6.0 - 12.0 fL Final   • Platelets 09/30/2019 264  140 - 450 10*3/mm3 Final   • Neutrophil % 09/30/2019 44.1  42.7 - 76.0 % Final   • Lymphocyte % 09/30/2019 38.6  19.6 - 45.3 % Final   • Monocyte % 09/30/2019 11.4  5.0 -  12.0 % Final   • Eosinophil % 09/30/2019 4.4  0.3 - 6.2 % Final   • Basophil % 09/30/2019 1.3  0.0 - 1.5 % Final   • Immature Grans % 09/30/2019 0.2  0.0 - 0.5 % Final   • Neutrophils, Absolute 09/30/2019 2.02  1.70 - 7.00 10*3/mm3 Final   • Lymphocytes, Absolute 09/30/2019 1.77  0.70 - 3.10 10*3/mm3 Final   • Monocytes, Absolute 09/30/2019 0.52  0.10 - 0.90 10*3/mm3 Final   • Eosinophils, Absolute 09/30/2019 0.20  0.00 - 0.40 10*3/mm3 Final   • Basophils, Absolute 09/30/2019 0.06  0.00 - 0.20 10*3/mm3 Final   • Immature Grans, Absolute 09/30/2019 0.01  0.00 - 0.05 10*3/mm3 Final   • nRBC 09/30/2019 0.0  0.0 - 0.2 /100 WBC Final

## 2020-10-23 ENCOUNTER — TRANSCRIBE ORDERS (OUTPATIENT)
Dept: ADMINISTRATIVE | Facility: HOSPITAL | Age: 55
End: 2020-10-23

## 2020-10-23 DIAGNOSIS — R91.1 LUNG NODULE: Primary | ICD-10-CM

## 2020-11-03 ENCOUNTER — OFFICE VISIT (OUTPATIENT)
Dept: CARDIOLOGY | Facility: CLINIC | Age: 55
End: 2020-11-03

## 2020-11-03 VITALS
SYSTOLIC BLOOD PRESSURE: 120 MMHG | WEIGHT: 215.2 LBS | BODY MASS INDEX: 30.81 KG/M2 | HEIGHT: 70 IN | DIASTOLIC BLOOD PRESSURE: 60 MMHG | HEART RATE: 67 BPM

## 2020-11-03 DIAGNOSIS — E78.5 HYPERLIPIDEMIA LDL GOAL <100: Primary | ICD-10-CM

## 2020-11-03 DIAGNOSIS — I20.8 ANGINA AT REST (HCC): ICD-10-CM

## 2020-11-03 PROCEDURE — 93000 ELECTROCARDIOGRAM COMPLETE: CPT | Performed by: INTERNAL MEDICINE

## 2020-11-03 PROCEDURE — 99204 OFFICE O/P NEW MOD 45 MIN: CPT | Performed by: INTERNAL MEDICINE

## 2020-11-03 NOTE — PROGRESS NOTES
Subjective:     Encounter Date:11/03/2020      Patient ID: Kalpana Stringer is a 55 y.o. female.    Chief Complaint:  Chest pain  HPI:   This is a 55 year old woman with a past medical history of lifestyle-treated hyperlipidemia, tobacco use, and anxiety. She was referred by Dr. Sunday Ramírez for chest pain. She has noticed that over the last several weeks to months she has had exertional heaviness. She notices pressure and dyspnea particularly when hiking or climbing stairs. 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 She has never been on therapy for hypertension or diabetes. She has been told that by auscultation she has a stenotic subclavian, but she has never had asymmetric pulses, pressures or arm pain.   She is a former heavy smoker who now smokes intermittently. She drinks occasionally. She has a family history of cardiac disease, her father has had AF and CAD.     The following portions of the patient's history were reviewed and updated as appropriate: allergies, current medications, past family history, past medical history, past social history, past surgical history and problem list.     REVIEW OF SYSTEMS:   All systems reviewed.  Pertinent positives identified in HPI.  All other systems are negative.    Past Medical History:   Diagnosis Date   • Anxiety    • Atypical chest pain    • Chest tightness    • Hyperlipidemia    • Nodule of right lung    • Smoker    • Subclavian artery stenosis (CMS/HCC)        Family History   Problem Relation Age of Onset   • Diabetes Mother    • Hyperlipidemia Mother    • Hypertension Mother    • Stroke Mother    • Hyperlipidemia Father    • Hypertension Father    • Arthritis Father         Rheumatoid   • Fibromyalgia Sister    • Cervical cancer Paternal Aunt        Social History     Socioeconomic History   • Marital status: Single     Spouse name: Not on file   • Number of children: 2   • Years of  education: Not on file   • Highest education level: Not on file   Tobacco Use   • Smoking status: Current Every Day Smoker     Types: Cigarettes   • Smokeless tobacco: Never Used   Substance and Sexual Activity   • Alcohol use: Yes     Comment: Socially   • Drug use: No   • Sexual activity: Not Currently     Partners: Male     Birth control/protection: Surgical       No Known Allergies    Past Surgical History:   Procedure Laterality Date   • TUBAL ABDOMINAL LIGATION           ECG 12 Lead    Date/Time: 11/3/2020 3:59 PM  Performed by: Marisa Connolly MD  Authorized by: Marisa Connolly MD   Comparison: compared with previous ECG from 7/14/2018  Similar to previous ECG  Rhythm: sinus rhythm  Rate: normal  Conduction: conduction normal  ST Segments: ST segments normal  T Waves: T waves normal  QRS axis: normal  Other: no other findings    Clinical impression: normal ECG             Objective:         PHYSICAL EXAM:  GEN: VSS, no distress,   Eyes: normal sclera, normal lids and lashes  HENT: moist mucus membranes,   Respiratory: CTAB, no rales or wheezes  CV: RRR, no murmurs, , +2 DP and 2+ carotid pulses b/l  GI: NABS, soft,  Nontender, nondistended  MSK: no edema, no scoliosis or kyphosis  Skin: no rash, warm, dry  Heme/Lymph: no bruising or bleeding  Psych: organized thought, normal behavior and affect  Neuro: Cranial nerves grossly intact, Alert and Oriented x 3.         Assessment:          Diagnosis Plan   1. Hyperlipidemia LDL goal <100  Lipid Panel   2. Angina at rest (CMS/[facility])  Stress Test With Myocardial Perfusion          Plan:       1. This is a 55 year old woman with a number of coronary risk factors including active tobacco use, hyperlipidemia, and family history. She has classic exertional angina. We will proceed with nuclear stress testing for risk stratification and likely will end up starting anti-anginal therapy and possible left heart catheterization.     Dr. Rios and Dr. Ramírez,  thank you very much  for referring this kind patient to me. Please call me with any questions or concerns. I will see the patient again in the office in 3 months or sooner based on the results of her stress test.          Marisa Connolly MD  11/03/20  [facility]    Outpatient Encounter Medications as of 11/3/2020   Medication Sig Dispense Refill   • aspirin 81 MG EC tablet Take 81 mg by mouth Daily.     • aspirin-acetaminophen-caffeine (EXCEDRIN MIGRAINE) 250-250-65 MG per tablet Take 1 tablet by mouth Every 6 (Six) Hours As Needed for Headache.     • calcium carbonate EX (TUMS EX) 750 MG chewable tablet Chew 750 mg. Two or three tabs 4 to 5 times daily as needed     • cyclobenzaprine (FLEXERIL) 10 MG tablet Take 1 tablet by mouth 3 (Three) Times a Day As Needed for Muscle Spasms for up to 10 days. 30 tablet 0   • Magnesium 250 MG tablet Take 1 tablet by mouth 2 (Two) Times a Day As Needed.     • naproxen (NAPROSYN) 500 MG tablet Take 1 tablet by mouth 2 (Two) Times a Day With Meals for 10 days. 20 tablet 0   • naproxen sodium (ALEVE) 220 MG tablet Take 440 mg by mouth daily as needed for mild pain (1-3).     • omeprazole (priLOSEC) 40 MG capsule Take 1 capsule by mouth Daily. 90 capsule 3   • Specialty Vitamins Products (MENOPAUSE RELIEF PO) Take 2 tablets by mouth Daily.     • TiZANidine (ZANAFLEX) 2 MG capsule Take 2 capsules by mouth 3 (Three) Times a Day As Needed for Muscle Spasms. 90 capsule 1   • Unable to find Hydrolyzed collagen powder - once daily     • [DISCONTINUED] Biotin 1000 MCG tablet Take 1,000 mcg by mouth 3 (Three) Times a Day.     • [DISCONTINUED] calcium citrate-vitamin d (CITRACAL) 200-250 MG-UNIT tablet tablet Take 1 tablet by mouth Daily.       No facility-administered encounter medications on file as of 11/3/2020.

## 2020-11-11 ENCOUNTER — HOSPITAL ENCOUNTER (OUTPATIENT)
Dept: CT IMAGING | Facility: HOSPITAL | Age: 55
Discharge: HOME | End: 2020-11-11
Admitting: INTERNAL MEDICINE

## 2020-11-11 ENCOUNTER — HOSPITAL ENCOUNTER (OUTPATIENT)
Dept: CARDIOLOGY | Facility: HOSPITAL | Age: 55
Discharge: HOME | End: 2020-11-11
Admitting: INTERNAL MEDICINE

## 2020-11-11 VITALS — HEIGHT: 70 IN | WEIGHT: 215 LBS | BODY MASS INDEX: 30.78 KG/M2

## 2020-11-11 DIAGNOSIS — R91.1 LUNG NODULE: ICD-10-CM

## 2020-11-11 DIAGNOSIS — I20.8 ANGINA AT REST (HCC): ICD-10-CM

## 2020-11-11 LAB
BH CV NUCLEAR PRIOR STUDY: 3
BH CV STRESS BP STAGE 1: NORMAL
BH CV STRESS BP STAGE 2: NORMAL
BH CV STRESS BP STAGE 3: NORMAL
BH CV STRESS DURATION MIN STAGE 1: 3
BH CV STRESS DURATION MIN STAGE 2: 3
BH CV STRESS DURATION MIN STAGE 3: 3
BH CV STRESS DURATION SEC STAGE 1: 0
BH CV STRESS DURATION SEC STAGE 2: 0
BH CV STRESS DURATION SEC STAGE 3: 0
BH CV STRESS GRADE STAGE 1: 10
BH CV STRESS GRADE STAGE 2: 12
BH CV STRESS GRADE STAGE 3: 14
BH CV STRESS HR STAGE 1: 109
BH CV STRESS HR STAGE 2: 135
BH CV STRESS HR STAGE 3: 154
BH CV STRESS METS STAGE 1: 5
BH CV STRESS METS STAGE 2: 7.5
BH CV STRESS METS STAGE 3: 10
BH CV STRESS PROTOCOL 1: NORMAL
BH CV STRESS RECOVERY BP: NORMAL MMHG
BH CV STRESS RECOVERY HR: 90 BPM
BH CV STRESS SPEED STAGE 1: 1.7
BH CV STRESS SPEED STAGE 2: 2.5
BH CV STRESS SPEED STAGE 3: 3.4
BH CV STRESS STAGE 1: 1
BH CV STRESS STAGE 2: 2
BH CV STRESS STAGE 3: 3
LV EF NUC BP: 63 %
MAXIMAL PREDICTED HEART RATE: 167 BPM
PERCENT MAX PREDICTED HR: 92.22 %
STRESS BASELINE BP: NORMAL MMHG
STRESS BASELINE HR: 68 BPM
STRESS PERCENT HR: 108 %
STRESS POST ESTIMATED WORKLOAD: 10 METS
STRESS POST EXERCISE DUR MIN: 9 MIN
STRESS POST EXERCISE DUR SEC: 0 SEC
STRESS POST PEAK BP: NORMAL MMHG
STRESS POST PEAK HR: 154 BPM
STRESS TARGET HR: 142 BPM

## 2020-11-11 PROCEDURE — 93017 CV STRESS TEST TRACING ONLY: CPT

## 2020-11-11 PROCEDURE — A9502 TC99M TETROFOSMIN: HCPCS | Performed by: INTERNAL MEDICINE

## 2020-11-11 PROCEDURE — 78452 HT MUSCLE IMAGE SPECT MULT: CPT

## 2020-11-11 PROCEDURE — 78452 HT MUSCLE IMAGE SPECT MULT: CPT | Performed by: INTERNAL MEDICINE

## 2020-11-11 PROCEDURE — 93016 CV STRESS TEST SUPVJ ONLY: CPT | Performed by: INTERNAL MEDICINE

## 2020-11-11 PROCEDURE — 0 TECHNETIUM TETROFOSMIN KIT: Performed by: INTERNAL MEDICINE

## 2020-11-11 PROCEDURE — 93018 CV STRESS TEST I&R ONLY: CPT | Performed by: INTERNAL MEDICINE

## 2020-11-11 PROCEDURE — 71250 CT THORAX DX C-: CPT

## 2020-11-11 RX ADMIN — TETROFOSMIN 1 DOSE: 1.38 INJECTION, POWDER, LYOPHILIZED, FOR SOLUTION INTRAVENOUS at 09:13

## 2020-11-11 RX ADMIN — TETROFOSMIN 1 DOSE: 1.38 INJECTION, POWDER, LYOPHILIZED, FOR SOLUTION INTRAVENOUS at 08:15

## 2020-11-11 NOTE — PROGRESS NOTES
Please call patient with their normal test results. How is she feeling? I would like her to follow up with me in 4-6 weeks in the office.

## 2020-11-13 ENCOUNTER — APPOINTMENT (OUTPATIENT)
Dept: WOMENS IMAGING | Facility: HOSPITAL | Age: 55
End: 2020-11-13

## 2020-11-13 ENCOUNTER — PROCEDURE VISIT (OUTPATIENT)
Dept: OBSTETRICS AND GYNECOLOGY | Facility: CLINIC | Age: 55
End: 2020-11-13

## 2020-11-13 ENCOUNTER — OFFICE VISIT (OUTPATIENT)
Dept: OBSTETRICS AND GYNECOLOGY | Facility: CLINIC | Age: 55
End: 2020-11-13

## 2020-11-13 VITALS
BODY MASS INDEX: 30.61 KG/M2 | HEIGHT: 70 IN | WEIGHT: 213.8 LBS | DIASTOLIC BLOOD PRESSURE: 68 MMHG | SYSTOLIC BLOOD PRESSURE: 118 MMHG

## 2020-11-13 DIAGNOSIS — Z12.31 VISIT FOR SCREENING MAMMOGRAM: Primary | ICD-10-CM

## 2020-11-13 DIAGNOSIS — Z12.11 COLON CANCER SCREENING: ICD-10-CM

## 2020-11-13 DIAGNOSIS — Z00.00 ANNUAL PHYSICAL EXAM: Primary | ICD-10-CM

## 2020-11-13 DIAGNOSIS — N95.1 MENOPAUSAL SYMPTOMS: ICD-10-CM

## 2020-11-13 PROCEDURE — 77067 SCR MAMMO BI INCL CAD: CPT | Performed by: RADIOLOGY

## 2020-11-13 PROCEDURE — 77063 BREAST TOMOSYNTHESIS BI: CPT | Performed by: RADIOLOGY

## 2020-11-13 PROCEDURE — 99213 OFFICE O/P EST LOW 20 MIN: CPT | Performed by: OBSTETRICS & GYNECOLOGY

## 2020-11-13 PROCEDURE — 99396 PREV VISIT EST AGE 40-64: CPT | Performed by: OBSTETRICS & GYNECOLOGY

## 2020-11-13 PROCEDURE — 77067 SCR MAMMO BI INCL CAD: CPT | Performed by: OBSTETRICS & GYNECOLOGY

## 2020-11-13 PROCEDURE — 77063 BREAST TOMOSYNTHESIS BI: CPT | Performed by: OBSTETRICS & GYNECOLOGY

## 2020-11-13 RX ORDER — ESTRADIOL 0.75 MG/1.25G
1.25 GEL, METERED TOPICAL DAILY
Qty: 1 BOTTLE | Refills: 12 | Status: SHIPPED | OUTPATIENT
Start: 2020-11-13 | End: 2022-04-29

## 2020-11-13 NOTE — PROGRESS NOTES
HPI   Kalpana Stringer  is a 55 y.o. female who presents for 2 reasons.  First, she would like to have a routine gynecologic exam.  Overall, she is feeling well.  Bowels and bladder are functioning normally.  Mammogram has been performed.  The patient has not yet undergone colonoscopy.  Next, the patient has hot flashes and night sweats.  Also, a decrease in her libido.  She would like to discuss options for management of this as well.    Chief Complaint   Patient presents with   • Gynecologic Exam     Patient is here for a annual.       Past Medical History:   Diagnosis Date   • Anxiety    • Atypical chest pain    • Chest tightness    • Hyperlipidemia    • Nodule of right lung    • Smoker    • Subclavian artery stenosis (CMS/HCC)        Past Surgical History:   Procedure Laterality Date   • TUBAL ABDOMINAL LIGATION         Social History     Socioeconomic History   • Marital status: Single     Spouse name: Not on file   • Number of children: 2   • Years of education: Not on file   • Highest education level: Not on file   Tobacco Use   • Smoking status: Current Every Day Smoker     Types: Cigarettes   • Smokeless tobacco: Never Used   Substance and Sexual Activity   • Alcohol use: Yes     Comment: Socially   • Drug use: No   • Sexual activity: Not Currently     Partners: Male     Birth control/protection: Surgical       The following portions of the patient's history were reviewed and updated as appropriate: allergies, current medications, past family history, past medical history, past social history, past surgical history and problem list.    Review of Systems  This is positive for hot flashes and night sweats.  It is positive for decreased libido.  It is negative for vaginal dryness.  It is negative for cold intolerance or fatigue.  All other systems are reviewed and are negative.        Physical Exam  Vitals signs and nursing note reviewed.   Constitutional:       Appearance: She is well-developed.   HENT:       Head: Normocephalic and atraumatic.   Cardiovascular:      Rate and Rhythm: Normal rate and regular rhythm.   Pulmonary:      Effort: Pulmonary effort is normal.      Breath sounds: Normal breath sounds. No wheezing or rales.   Chest:      Comments: The breasts are homogeneous.  There are no palpable lumps.  Nipple discharge and axillary adenopathy are absent.  Abdominal:      General: There is no distension.      Palpations: Abdomen is soft.      Tenderness: There is no abdominal tenderness.   Genitourinary:     Labia:         Right: No lesion.         Left: No lesion.       Vagina: Normal. No vaginal discharge.      Cervix: No cervical motion tenderness.      Uterus: Normal. Not enlarged and not tender.       Adnexa:         Right: No mass or tenderness.          Left: No mass or tenderness.     Skin:     General: Skin is warm and dry.   Neurological:      Mental Status: She is alert and oriented to person, place, and time.         Assessment    Diagnoses and all orders for this visit:    1. Annual physical exam (Primary)    2. Menopausal symptoms    Other orders  -     Estradiol (Estrogel) 0.75 MG/1.25 GM (0.06%) topical gel; Place 1.25 g on the skin as directed by provider Daily.  Dispense: 1 bottle; Refill: 12  -     progesterone (Prometrium) 100 MG capsule; Take 1 capsule by mouth Daily.  Dispense: 30 capsule; Refill: 11        Plan  1. Annual examination performed  2. Counseled regarding the importance of regular screening mammograms.  The patient had a mammogram today.  3. Counseled regarding the importance of colon cancer screening.  Questions answered.  The patient would like to proceed with colonoscopy.  A referral will be sent.  4. Menopausal symptoms.  Counseled and questions answered.  15 minutes of a 30-minute visit were spent in direct face-to-face counseling on this issue.  The patient would like to consider hormone replacement.  We discussed the benefits, risks and alternatives to this.  Data from  the [facility] were also reviewed.  The patient would like to start hormone replacement and prefers transdermal estrogen.  A prescription for Estrogel and Prometrium has been sent.  The patient has been counseled regarding proper use.    5. Return in about 1 year (around 11/13/2021).    Social History     Tobacco Use   Smoking Status Current Every Day Smoker   • Types: Cigarettes   6.

## 2020-11-17 ENCOUNTER — TELEPHONE (OUTPATIENT)
Dept: CARDIOLOGY | Facility: CLINIC | Age: 55
End: 2020-11-17

## 2020-11-17 LAB
CONV .: NORMAL
CYTOLOGIST CVX/VAG CYTO: NORMAL
CYTOLOGY CVX/VAG DOC CYTO: NORMAL
CYTOLOGY CVX/VAG DOC THIN PREP: NORMAL
DX ICD CODE: NORMAL
HIV 1 & 2 AB SER-IMP: NORMAL
OTHER STN SPEC: NORMAL
STAT OF ADQ CVX/VAG CYTO-IMP: NORMAL

## 2020-11-17 NOTE — TELEPHONE ENCOUNTER
----- Message from Marisa Connolly MD sent at 11/11/2020  3:52 PM EST -----  Please call patient with their normal test results. How is she feeling? I would like her to follow up with me in 4-6 weeks in the office.

## 2020-11-18 NOTE — TELEPHONE ENCOUNTER
Pt notified of normal results and voiced understanding. She had no further questions at this time.     Pt stated she is felling a little better but still having some slight pain. She did say she was just diagnosed with asthma.     Pt is going on vacation and is going to call back and schedule appointment once she returns.

## 2020-12-03 ENCOUNTER — TRANSCRIBE ORDERS (OUTPATIENT)
Dept: ADMINISTRATIVE | Facility: HOSPITAL | Age: 55
End: 2020-12-03

## 2020-12-03 DIAGNOSIS — R91.1 LUNG NODULE: Primary | ICD-10-CM

## 2020-12-04 ENCOUNTER — TELEPHONE (OUTPATIENT)
Dept: GASTROENTEROLOGY | Facility: CLINIC | Age: 55
End: 2020-12-04

## 2020-12-08 ENCOUNTER — HOSPITAL ENCOUNTER (OUTPATIENT)
Dept: GENERAL RADIOLOGY | Facility: HOSPITAL | Age: 55
Discharge: HOME | End: 2020-12-08

## 2020-12-08 ENCOUNTER — OFFICE VISIT (OUTPATIENT)
Dept: INTERNAL MEDICINE | Facility: CLINIC | Age: 55
End: 2020-12-08

## 2020-12-08 ENCOUNTER — LAB (OUTPATIENT)
Dept: LAB | Facility: HOSPITAL | Age: 55
End: 2020-12-08

## 2020-12-08 VITALS
BODY MASS INDEX: 31.14 KG/M2 | HEART RATE: 73 BPM | DIASTOLIC BLOOD PRESSURE: 62 MMHG | WEIGHT: 217.5 LBS | HEIGHT: 70 IN | SYSTOLIC BLOOD PRESSURE: 124 MMHG | OXYGEN SATURATION: 99 %

## 2020-12-08 DIAGNOSIS — R19.7 DIARRHEA OF PRESUMED INFECTIOUS ORIGIN: ICD-10-CM

## 2020-12-08 DIAGNOSIS — M79.672 LEFT FOOT PAIN: Primary | ICD-10-CM

## 2020-12-08 DIAGNOSIS — E78.2 MIXED HYPERLIPIDEMIA: ICD-10-CM

## 2020-12-08 DIAGNOSIS — Z12.11 COLON CANCER SCREENING: ICD-10-CM

## 2020-12-08 LAB
ALBUMIN SERPL-MCNC: 4.3 G/DL (ref 3.5–5.2)
ALBUMIN/GLOB SERPL: 1.3 G/DL
ALP SERPL-CCNC: 83 U/L (ref 39–117)
ALT SERPL W P-5'-P-CCNC: 43 U/L (ref 1–33)
ANION GAP SERPL CALCULATED.3IONS-SCNC: 8.9 MMOL/L (ref 5–15)
AST SERPL-CCNC: 37 U/L (ref 1–32)
BASOPHILS # BLD AUTO: 0.07 10*3/MM3 (ref 0–0.2)
BASOPHILS NFR BLD AUTO: 1.4 % (ref 0–1.5)
BILIRUB SERPL-MCNC: 0.2 MG/DL (ref 0–1.2)
BUN SERPL-MCNC: 15 MG/DL (ref 6–20)
BUN/CREAT SERPL: 18.3 (ref 7–25)
CALCIUM SPEC-SCNC: 9.3 MG/DL (ref 8.6–10.5)
CHLORIDE SERPL-SCNC: 104 MMOL/L (ref 98–107)
CHOLEST SERPL-MCNC: 222 MG/DL (ref 0–200)
CO2 SERPL-SCNC: 26.1 MMOL/L (ref 22–29)
CREAT SERPL-MCNC: 0.82 MG/DL (ref 0.57–1)
DEPRECATED RDW RBC AUTO: 43.4 FL (ref 37–54)
EOSINOPHIL # BLD AUTO: 0.17 10*3/MM3 (ref 0–0.4)
EOSINOPHIL NFR BLD AUTO: 3.4 % (ref 0.3–6.2)
ERYTHROCYTE [DISTWIDTH] IN BLOOD BY AUTOMATED COUNT: 13.6 % (ref 12.3–15.4)
GFR SERPL CREATININE-BSD FRML MDRD: 73 ML/MIN/1.73
GLOBULIN UR ELPH-MCNC: 3.3 GM/DL
GLUCOSE SERPL-MCNC: 106 MG/DL (ref 65–99)
HCT VFR BLD AUTO: 37.5 % (ref 34–46.6)
HDLC SERPL-MCNC: 60 MG/DL (ref 40–60)
HGB BLD-MCNC: 12.4 G/DL (ref 12–15.9)
IMM GRANULOCYTES # BLD AUTO: 0.01 10*3/MM3 (ref 0–0.05)
IMM GRANULOCYTES NFR BLD AUTO: 0.2 % (ref 0–0.5)
LDLC SERPL CALC-MCNC: 120 MG/DL (ref 0–100)
LDLC/HDLC SERPL: 1.89 {RATIO}
LYMPHOCYTES # BLD AUTO: 2.03 10*3/MM3 (ref 0.7–3.1)
LYMPHOCYTES NFR BLD AUTO: 41 % (ref 19.6–45.3)
MCH RBC QN AUTO: 29.2 PG (ref 26.6–33)
MCHC RBC AUTO-ENTMCNC: 33.1 G/DL (ref 31.5–35.7)
MCV RBC AUTO: 88.4 FL (ref 79–97)
MONOCYTES # BLD AUTO: 0.56 10*3/MM3 (ref 0.1–0.9)
MONOCYTES NFR BLD AUTO: 11.3 % (ref 5–12)
NEUTROPHILS NFR BLD AUTO: 2.11 10*3/MM3 (ref 1.7–7)
NEUTROPHILS NFR BLD AUTO: 42.7 % (ref 42.7–76)
NRBC BLD AUTO-RTO: 0 /100 WBC (ref 0–0.2)
PLATELET # BLD AUTO: 237 10*3/MM3 (ref 140–450)
PMV BLD AUTO: 9.7 FL (ref 6–12)
POTASSIUM SERPL-SCNC: 3.9 MMOL/L (ref 3.5–5.2)
PROT SERPL-MCNC: 7.6 G/DL (ref 6–8.5)
RBC # BLD AUTO: 4.24 10*6/MM3 (ref 3.77–5.28)
SODIUM SERPL-SCNC: 139 MMOL/L (ref 136–145)
TRIGL SERPL-MCNC: 242 MG/DL (ref 0–150)
VLDLC SERPL-MCNC: 42 MG/DL (ref 5–40)
WBC # BLD AUTO: 4.95 10*3/MM3 (ref 3.4–10.8)

## 2020-12-08 PROCEDURE — 85025 COMPLETE CBC W/AUTO DIFF WBC: CPT | Performed by: FAMILY MEDICINE

## 2020-12-08 PROCEDURE — 99213 OFFICE O/P EST LOW 20 MIN: CPT | Performed by: FAMILY MEDICINE

## 2020-12-08 PROCEDURE — 80053 COMPREHEN METABOLIC PANEL: CPT | Performed by: FAMILY MEDICINE

## 2020-12-08 PROCEDURE — 80061 LIPID PANEL: CPT | Performed by: FAMILY MEDICINE

## 2020-12-08 PROCEDURE — 36415 COLL VENOUS BLD VENIPUNCTURE: CPT | Performed by: FAMILY MEDICINE

## 2020-12-08 PROCEDURE — 73630 X-RAY EXAM OF FOOT: CPT

## 2020-12-08 NOTE — PROGRESS NOTES
Kalpana Stringer is a 55 y.o. female.      Assessment/Plan   Problems Addressed this Visit        Cardiovascular and Mediastinum    HLD (hyperlipidemia)    Relevant Orders    Lipid Panel (Completed)       Other    Colon cancer screening    Relevant Orders    Ambulatory Referral For Screening Colonoscopy      Other Visit Diagnoses     Left foot pain    -  Primary    Relevant Orders    XR Foot 3+ View Left (Completed)    Gastrointestinal Panel, PCR - Stool, Per Rectum    Diarrhea of presumed infectious origin        Relevant Orders    Comprehensive Metabolic Panel (Completed)    CBC & Differential (Completed)    Ova & Parasite Examination - Stool, Per Rectum    CBC Auto Differential (Completed)      Diagnoses       Codes Comments    Left foot pain    -  Primary ICD-10-CM: M79.672  ICD-9-CM: 729.5     Colon cancer screening     ICD-10-CM: Z12.11  ICD-9-CM: V76.51     Diarrhea of presumed infectious origin     ICD-10-CM: R19.7  ICD-9-CM: 009.3     Mixed hyperlipidemia     ICD-10-CM: E78.2  ICD-9-CM: 272.2            Follow-up results of laboratory testing for ongoing management of foot pain diarrhea lipids    Return if symptoms worsen or fail to improve, for Recheck.      Chief Complaint   Patient presents with   • Diarrhea   • Nausea   • pain in left foot for about 3 weeks     Social History     Tobacco Use   • Smoking status: Former Smoker     Types: Cigarettes   • Smokeless tobacco: Never Used   Substance Use Topics   • Alcohol use: Yes     Comment: Socially   • Drug use: No       History of Present Illness   Follow-up ongoing management of colon cancer screening hyperlipidemia needs labs drawn she also has developed diarrhea over the last 2 weeks 3-5 times a day watery brown no black she feels she did develop this after she was on vacation swim in the [facility] she remembers having multiples of water.  She also has 3-week history of foot pain swollen and tender no specific trauma but it is the left foot  "base of the second and third toes no specific treatments tried she is on her feet most of the day  The following portions of the patient's history were reviewed and updated as appropriate:PMHroutine: Social history , Allergies, Current Medications, Active Problem List and Health Maintenance    Review of Systems   Constitutional: Negative.    HENT: Negative.    Gastrointestinal: Positive for diarrhea.   Genitourinary: Negative.    Neurological: Negative.    Psychiatric/Behavioral: Negative.        Objective   Vitals:    12/08/20 1116   BP: 124/62   BP Location: Left arm   Patient Position: Sitting   Cuff Size: Adult   Pulse: 73   SpO2: 99%   Weight: 98.7 kg (217 lb 8 oz)   Height: 177.8 cm (70\")     Body mass index is 31.21 kg/m².  Physical Exam  Vitals signs and nursing note reviewed.   Constitutional:       Appearance: Normal appearance.   HENT:      Head: Normocephalic and atraumatic.   Eyes:      General: No scleral icterus.     Extraocular Movements: Extraocular movements intact.   Cardiovascular:      Rate and Rhythm: Normal rate and regular rhythm.      Pulses: Normal pulses.      Heart sounds: Normal heart sounds.   Pulmonary:      Effort: Pulmonary effort is normal.      Breath sounds: Normal breath sounds.   Abdominal:      General: Abdomen is flat.      Tenderness: There is no abdominal tenderness.   Skin:     General: Skin is warm and dry.   Neurological:      Mental Status: She is alert.   Psychiatric:         Mood and Affect: Mood normal.         Behavior: Behavior normal.         Thought Content: Thought content normal.         Judgment: Judgment normal.       Reviewed Data:  Office Visit on 12/08/2020   Component Date Value Ref Range Status   • Glucose 12/08/2020 106* 65 - 99 mg/dL Final   • BUN 12/08/2020 15  6 - 20 mg/dL Final   • Creatinine 12/08/2020 0.82  0.57 - 1.00 mg/dL Final   • Sodium 12/08/2020 139  136 - 145 mmol/L Final   • Potassium 12/08/2020 3.9  3.5 - 5.2 mmol/L Final   • Chloride " 12/08/2020 104  98 - 107 mmol/L Final   • CO2 12/08/2020 26.1  22.0 - 29.0 mmol/L Final   • Calcium 12/08/2020 9.3  8.6 - 10.5 mg/dL Final   • Total Protein 12/08/2020 7.6  6.0 - 8.5 g/dL Final   • Albumin 12/08/2020 4.30  3.50 - 5.20 g/dL Final   • ALT (SGPT) 12/08/2020 43* 1 - 33 U/L Final   • AST (SGOT) 12/08/2020 37* 1 - 32 U/L Final   • Alkaline Phosphatase 12/08/2020 83  39 - 117 U/L Final   • Total Bilirubin 12/08/2020 0.2  0.0 - 1.2 mg/dL Final   • eGFR Non African Amer 12/08/2020 73  >60 mL/min/1.73 Final   • Globulin 12/08/2020 3.3  gm/dL Final   • A/G Ratio 12/08/2020 1.3  g/dL Final   • BUN/Creatinine Ratio 12/08/2020 18.3  7.0 - 25.0 Final   • Anion Gap 12/08/2020 8.9  5.0 - 15.0 mmol/L Final   • Total Cholesterol 12/08/2020 222* 0 - 200 mg/dL Final   • Triglycerides 12/08/2020 242* 0 - 150 mg/dL Final   • HDL Cholesterol 12/08/2020 60  40 - 60 mg/dL Final   • LDL Cholesterol  12/08/2020 120* 0 - 100 mg/dL Final   • VLDL Cholesterol 12/08/2020 42* 5 - 40 mg/dL Final   • LDL/HDL Ratio 12/08/2020 1.89   Final   • WBC 12/08/2020 4.95  3.40 - 10.80 10*3/mm3 Final   • RBC 12/08/2020 4.24  3.77 - 5.28 10*6/mm3 Final   • Hemoglobin 12/08/2020 12.4  12.0 - 15.9 g/dL Final   • Hematocrit 12/08/2020 37.5  34.0 - 46.6 % Final   • MCV 12/08/2020 88.4  79.0 - 97.0 fL Final   • MCH 12/08/2020 29.2  26.6 - 33.0 pg Final   • MCHC 12/08/2020 33.1  31.5 - 35.7 g/dL Final   • RDW 12/08/2020 13.6  12.3 - 15.4 % Final   • RDW-SD 12/08/2020 43.4  37.0 - 54.0 fl Final   • MPV 12/08/2020 9.7  6.0 - 12.0 fL Final   • Platelets 12/08/2020 237  140 - 450 10*3/mm3 Final   • Neutrophil % 12/08/2020 42.7  42.7 - 76.0 % Final   • Lymphocyte % 12/08/2020 41.0  19.6 - 45.3 % Final   • Monocyte % 12/08/2020 11.3  5.0 - 12.0 % Final   • Eosinophil % 12/08/2020 3.4  0.3 - 6.2 % Final   • Basophil % 12/08/2020 1.4  0.0 - 1.5 % Final   • Immature Grans % 12/08/2020 0.2  0.0 - 0.5 % Final   • Neutrophils, Absolute 12/08/2020 2.11  1.70 -  7.00 10*3/mm3 Final   • Lymphocytes, Absolute 12/08/2020 2.03  0.70 - 3.10 10*3/mm3 Final   • Monocytes, Absolute 12/08/2020 0.56  0.10 - 0.90 10*3/mm3 Final   • Eosinophils, Absolute 12/08/2020 0.17  0.00 - 0.40 10*3/mm3 Final   • Basophils, Absolute 12/08/2020 0.07  0.00 - 0.20 10*3/mm3 Final   • Immature Grans, Absolute 12/08/2020 0.01  0.00 - 0.05 10*3/mm3 Final   • nRBC 12/08/2020 0.0  0.0 - 0.2 /100 WBC Final   Office Visit on 11/13/2020   Component Date Value Ref Range Status   • Diagnosis 11/13/2020 Comment   Final    NEGATIVE FOR INTRAEPITHELIAL LESION OR MALIGNANCY.   • Specimen adequacy: 11/13/2020 Comment   Final    Comment: Satisfactory for evaluation.  Endocervical and/or squamous metaplastic  cells (endocervical component) are present.     • Clinician Provided ICD-10: 11/13/2020 Comment   Final    Z00.00   • Performed by: 11/13/2020 Comment   Final    Luis Robles, Cytotechnologist (ASCP)   • . 11/13/2020 .   Final   • Note: 11/13/2020 Comment   Final    Comment: The Pap smear is a screening test designed to aid in the detection of  premalignant and malignant conditions of the uterine cervix.  It is not a  diagnostic procedure and should not be used as the sole means of detecting  cervical cancer.  Both false-positive and false-negative reports do occur.     • Method: 11/13/2020 Comment   Final    Comment: This liquid based ThinPrep(R) pap test was screened with the  use of an image guided system.     • Conv .conv 11/13/2020 Comment   Final    Comment: The HPV DNA reflex criteria were not met with this specimen result  therefore, no HPV testing was performed.     Hospital Outpatient Visit on 11/11/2020   Component Date Value Ref Range Status   • Nuclear Prior Study 11/11/2020 3   Final   • BH CV STRESS PROTOCOL 1 11/11/2020 Luis   Final   • Stage 1 11/11/2020 1   Final   • HR Stage 1 11/11/2020 109   Final   • BP Stage 1 11/11/2020 160/70   Final   • Duration Min Stage 1 11/11/2020 3   Final    • Duration Sec Stage 1 11/11/2020 0   Final   • Grade Stage 1 11/11/2020 10   Final   • Speed Stage 1 11/11/2020 1.7   Final   • BH CV STRESS METS STAGE 1 11/11/2020 5   Final   • Stage 2 11/11/2020 2   Final   • HR Stage 2 11/11/2020 135   Final   • BP Stage 2 11/11/2020 170/70   Final   • Duration Min Stage 2 11/11/2020 3   Final   • Duration Sec Stage 2 11/11/2020 0   Final   • Grade Stage 2 11/11/2020 12   Final   • Speed Stage 2 11/11/2020 2.5   Final   • BH CV STRESS METS STAGE 2 11/11/2020 7.5   Final   • Stage 3 11/11/2020 3   Final   • HR Stage 3 11/11/2020 154   Final   • BP Stage 3 11/11/2020 186/74   Final   • Duration Min Stage 3 11/11/2020 3   Final   • Duration Sec Stage 3 11/11/2020 0   Final   • Grade Stage 3 11/11/2020 14   Final   • Speed Stage 3 11/11/2020 3.4   Final   • BH CV STRESS METS STAGE 3 11/11/2020 10.0   Final   • Baseline HR 11/11/2020 68  bpm Final   • Baseline BP 11/11/2020 144/92  mmHg Final   • Peak HR 11/11/2020 154  bpm Final   • Percent Max Pred HR 11/11/2020 92.22  % Final   • Percent Target HR 11/11/2020 108  % Final   • Peak BP 11/11/2020 186/74  mmHg Final   • Recovery HR 11/11/2020 90  bpm Final   • Recovery BP 11/11/2020 170/70  mmHg Final   • Target HR (85%) 11/11/2020 142  bpm Final   • Max. Pred. HR (100%) 11/11/2020 167  bpm Final   • Exercise duration (min) 11/11/2020 9  min Final   • Exercise duration (sec) 11/11/2020 0  sec Final   • Estimated workload 11/11/2020 10.0  METS Final   • Nuc Stress EF 11/11/2020 63  % Final

## 2020-12-09 ENCOUNTER — TELEPHONE (OUTPATIENT)
Dept: GASTROENTEROLOGY | Facility: CLINIC | Age: 55
End: 2020-12-09

## 2020-12-14 ENCOUNTER — TELEPHONE (OUTPATIENT)
Dept: INTERNAL MEDICINE | Facility: CLINIC | Age: 55
End: 2020-12-14

## 2020-12-14 DIAGNOSIS — R74.8 ELEVATED LIVER ENZYMES: ICD-10-CM

## 2020-12-14 DIAGNOSIS — M79.672 BILATERAL FOOT PAIN: Primary | ICD-10-CM

## 2020-12-14 DIAGNOSIS — M79.671 BILATERAL FOOT PAIN: Primary | ICD-10-CM

## 2020-12-14 DIAGNOSIS — R73.09 ELEVATED GLUCOSE: Primary | ICD-10-CM

## 2020-12-14 NOTE — TELEPHONE ENCOUNTER
Patient stated that she has not had a chance to collect a stool sample.  She was sick over the weekend and is now feeling much better and thinks that whatever stomach bug she had is over.  She will call the office if she begins to feel worse.

## 2021-05-04 ENCOUNTER — TRANSCRIBE ORDERS (OUTPATIENT)
Dept: ADMINISTRATIVE | Facility: HOSPITAL | Age: 56
End: 2021-05-04

## 2021-05-04 DIAGNOSIS — R91.1 LUNG NODULE: Primary | ICD-10-CM

## 2021-06-03 ENCOUNTER — TELEPHONE (OUTPATIENT)
Dept: INTERNAL MEDICINE | Facility: CLINIC | Age: 56
End: 2021-06-03

## 2021-06-03 ENCOUNTER — APPOINTMENT (OUTPATIENT)
Dept: GENERAL RADIOLOGY | Facility: HOSPITAL | Age: 56
End: 2021-06-03

## 2021-06-03 PROCEDURE — 73030 X-RAY EXAM OF SHOULDER: CPT | Performed by: FAMILY MEDICINE

## 2021-06-03 NOTE — TELEPHONE ENCOUNTER
Caller: Kalpana Stringer    Relationship: Self    Best call back number: [phone]    What is the best time to reach you: ANYTIME    Who are you requesting to speak with (clinical staff, provider,  specific staff member): PROVIDER OR MEDICAL ASSISTANT    What was the call regarding:THE PATIENT VISITED URGENT CARE TODAY FOR SHOULDER PAIN. THE PATIENT STATES THAT SHE HAD X-RAYS AND WAS TOLD TO FOLLOW-UP WITH EITHER HER PROVIDER OR ORTHOPEDICS SOON. THE PATIENT CANNOT LIFT HER ARM ABOVE SHOULDER LENGTH. SHE CANNOT SLEEP DUE TO THE PAIN, AND ANY OVER-EXERTION LEADS TO SHOOTING PAIN. THE PATIENT WAS TOLD TO BE OFF FROM WORK UNTIL 06/07/2021.    Do you require a callback: YES, PLEASE

## 2021-07-01 ENCOUNTER — OFFICE VISIT (OUTPATIENT)
Dept: INTERNAL MEDICINE | Facility: CLINIC | Age: 56
End: 2021-07-01

## 2021-07-01 VITALS
DIASTOLIC BLOOD PRESSURE: 76 MMHG | SYSTOLIC BLOOD PRESSURE: 134 MMHG | OXYGEN SATURATION: 99 % | HEIGHT: 70 IN | HEART RATE: 76 BPM | BODY MASS INDEX: 29.63 KG/M2 | WEIGHT: 207 LBS

## 2021-07-01 DIAGNOSIS — M25.511 ACUTE PAIN OF RIGHT SHOULDER: Primary | ICD-10-CM

## 2021-07-01 PROCEDURE — 99213 OFFICE O/P EST LOW 20 MIN: CPT | Performed by: FAMILY MEDICINE

## 2021-07-01 RX ORDER — CLONAZEPAM 0.5 MG/1
0.5 TABLET ORAL DAILY PRN
Qty: 15 TABLET | Refills: 0 | Status: CANCELLED | OUTPATIENT
Start: 2021-07-01

## 2021-07-01 NOTE — PROGRESS NOTES
"Chief Complaint  [facility] follow up to right shoulder pain    Subjective          Kalpana Stringer presents to [facility]  History of Present Illness  Shoulder pain for 6 weeks no improvement over-the-counter remedies anti-inflammatories and steroid packs as well as muscle relaxants patient points to the anterior shoulder no bony tenderness x-rays are negative.  Objective   Vital Signs:   /76 (BP Location: Right arm, Patient Position: Sitting, Cuff Size: Adult)   Pulse 76   Ht 177.8 cm (70\")   Wt 93.9 kg (207 lb)   SpO2 99%   BMI 29.70 kg/m²     Physical Exam  Constitutional:       Appearance: Normal appearance.   Cardiovascular:      Rate and Rhythm: Normal rate and regular rhythm.   Musculoskeletal:      Right shoulder: Swelling, deformity and tenderness present. No effusion, bony tenderness or crepitus. Decreased range of motion. Decreased strength.   Neurological:      Mental Status: She is alert.        Result Review :                 Assessment and Plan    Diagnoses and all orders for this visit:    1. Acute pain of right shoulder (Primary)  -     MRI Shoulder Right Without Contrast; Future  -     Ambulatory Referral to Physical Therapy Evaluate and treat    Other orders  -     Cancel: clonazePAM (KlonoPIN) 0.5 MG tablet; Take 1 tablet by mouth Daily As Needed for Anxiety.  Dispense: 15 tablet; Refill: 0        Follow Up   No follow-ups on file.  Patient was given instructions and counseling regarding her condition or for health maintenance advice. Please see specific information pulled into the AVS if appropriate.       "

## 2021-07-06 ENCOUNTER — TELEPHONE (OUTPATIENT)
Dept: INTERNAL MEDICINE | Facility: CLINIC | Age: 56
End: 2021-07-06

## 2021-07-06 ENCOUNTER — HOSPITAL ENCOUNTER (OUTPATIENT)
Dept: PHYSICAL THERAPY | Facility: HOSPITAL | Age: 56
Setting detail: THERAPIES SERIES
Discharge: HOME | End: 2021-07-06

## 2021-07-06 DIAGNOSIS — M25.611 DECREASED ROM OF RIGHT SHOULDER: ICD-10-CM

## 2021-07-06 DIAGNOSIS — R20.2 TINGLING OF RIGHT UPPER EXTREMITY: ICD-10-CM

## 2021-07-06 DIAGNOSIS — M25.511 ACUTE PAIN OF RIGHT SHOULDER: Primary | ICD-10-CM

## 2021-07-06 PROCEDURE — 97110 THERAPEUTIC EXERCISES: CPT

## 2021-07-06 PROCEDURE — 97161 PT EVAL LOW COMPLEX 20 MIN: CPT

## 2021-07-06 NOTE — THERAPY EVALUATION
Outpatient Physical Therapy Ortho Initial Evaluation  [facility]     Patient Name: Kalpana Stringer  : 1965  MRN: [id number]  Today's Date: 2021      Visit Date: 2021    Patient Active Problem List   Diagnosis   • Anxiety   • HLD (hyperlipidemia)   • Muscle ache   • Stenosis of subclavian artery (CMS/HCC)   • Avitaminosis D   • Arthritis   • Gastroesophageal reflux disease   • Lung nodule   • Colon cancer screening   • Rectal bleeding   • Chronic bilateral low back pain without sciatica   • Skin lesions   • Acute pain of right shoulder        Past Medical History:   Diagnosis Date   • Anxiety    • Atypical chest pain    • Chest tightness    • Hyperlipidemia    • Nodule of right lung    • Smoker    • Subclavian artery stenosis (CMS/HCC)         Past Surgical History:   Procedure Laterality Date   • TUBAL ABDOMINAL LIGATION         Visit Dx:     ICD-10-CM ICD-9-CM   1. Acute pain of right shoulder  M25.511 719.41   2. Tingling of right upper extremity  R20.2 782.0   3. Decreased ROM of right shoulder  M25.611 719.51         Patient History     Row Name 21 1000             History    Chief Complaint  Pain;Tinglings  -CN (r) NC (t) CN (c)      Brief Description of Current Complaint  Pt reports deadlifting at gym and her left hand gave out and her right hand took all the weight. She notes hearing a pop in the right shoulder and tried to push through the pain. Pt adds after 6-7 weeks of working with pain, the shoulder got worse and she went to the  after catching a falling obect at work and experiencing more pain. The MD took her off work until 21. She states starting to experiencing tingling in R fingers which has started in the past 1-2 weeks. Pt notes her pain is constant and relieved with half of a muscle relaxer which helps her sleep. 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 Pt states having MRI scheduled for July 30th.  -CN (r) NC (t) CN (c)      Hand Dominance  right-handed  -CN (r) NC (t) CN (c)      What clinical tests have you had for this problem?  X-ray  -CN (r) NC (t) CN (c)         Pain     Pain Location  Shoulder  -CN (r) NC (t) CN (c)      Pain at Present  7  -CN (r) NC (t) CN (c)      Pain at Best  4  -CN (r) NC (t) CN (c)      Pain Frequency  Constant/continuous  -CN (r) NC (t) CN (c)      Pain Description  Dull;Aching;Sharp  -CN (r) NC (t) CN (c)      What Performance Factors Make the Current Problem(s) BETTER?  not use, muscle relax, ice  -CN (r) NC (t) CN (c)      Is your sleep disturbed?  Yes  -CN (r) NC (t) CN (c)      Total hours of sleep per night  10hrs  -CN (r) NC (t) CN (c)      Difficulties at work?  6/12 of work  -CN (r) NC (t) CN (c)      Difficulties with ADL's?  bathing, cooking  -CN (r) NC (t) CN (c)         Fall Risk Assessment    Any falls in the past year:  No  -CN (r) NC (t) CN (c)         Services    Are you currently receiving Home Health services  No  -CN (r) NC (t) CN (c)         Daily Activities    Primary Language  English  -CN (r) NC (t) CN (c)      How does patient learn best?  Listening;Reading;Demonstration  -CN (r) NC (t) CN (c)      Barriers to learning  None  -CN (r) NC (t) CN (c)      Pt Participated in POC and Goals  Yes  -CN (r) NC (t) CN (c)         Safety    Are you being hurt, hit, or frightened by anyone at home or in your life?  No  -CN (r) NC (t) CN (c)      Are you being neglected by a caregiver  No  -CN (r) NC (t) CN (c)      Have you had any of the following issues with  N/A  -CN (r) NC (t) CN (c)        User Key  (r) = Recorded By, (t) = Taken By, (c) = Cosigned By    Initials Name Provider Type    Ros Weber, PT Physical Therapist    Felix Rodas, PT Student PT Student          PT Ortho     Row Name 07/06/21 1100       Myotomal Screen- Upper Quarter Clearing     Shoulder flexion (C5)  Left:;5 (Normal);Right:;3+ (Fair +) Increased pain on R  -CN (r) NC (t) CN (c)    Elbow flexion/wrist extension (C6)  Left:;5 (Normal);Right:;4- (Good -)  -CN (r) NC (t) CN (c)    Elbow extension/wrist flexion (C7)  Left:;5 (Normal);Right:;4 (Good)  -CN (r) NC (t) CN (c)       Cervical/Shoulder ROM Screen    Cervical flexion  Normal  -CN (r) NC (t) CN (c)    Cervical extension  Normal  -CN (r) NC (t) CN (c)    Cervical lateral flexion  Impaired  -CN (r) NC (t) CN (c)    Cervical rotation  Impaired  -CN (r) NC (t) CN (c)    Shoulder elevation   Impaired impaired on right  -CN (r) NC (t) CN (c)       General ROM    Head/Neck/Trunk  Neck Lt Rotation;Neck Rt Rotation  -CN (r) NC (t) CN (c)    RT Upper Ext  Rt Shoulder External Rotation;Rt Shoulder Internal Rotation;Rt Shoulder ABduction;Rt Shoulder Flexion  -CN (r) NC (t) CN (c)    LT Upper Ext  Lt Shoulder External Rotation;Lt Shoulder Internal Rotation;Lt Shoulder ABduction;Lt Shoulder Flexion  -CN (r) NC (t) CN (c)       Head/Neck/Trunk    Neck Lt Rotation AROM  40 deg  -CN (r) NC (t) CN (c)    Neck Rt Rotation AROM  44 deg  -CN (r) NC (t) CN (c)       Right Upper Ext    Rt Shoulder Abduction AROM  88 deg  -CN (r) NC (t) CN (c)    Rt Shoulder Flexion AROM  80 deg  -CN (r) NC (t) CN (c)    Rt Shoulder Flexion PROM  Aproximately 130 deg w/o capsular restriction; facial grimacing  -CN (r) NC (t) CN (c)    Rt Shoulder External Rotation AROM  FROM to R UT  -CN (r) NC (t) CN (c)    Rt Shoulder External Rotation PROM  Aproximately 50 deg w/ increased Muscle Guarding Unable to assess EROM joint mobility due to pain  -CN (r) NC (t) CN (c)    Rt Shoulder Internal Rotation AROM  FROM to L5  -CN (r) NC (t) CN (c)       Left Upper Ext    Lt Shoulder Abduction AROM  171 deg  -CN (r) NC (t) CN (c)    Lt Shoulder Flexion AROM  154 deg  -CN (r) NC (t) CN (c)    Lt Shoulder External Rotation AROM  FROM to T4  -CN (r) NC (t) CN (c)       MMT (Manual Muscle  Testing)    Rt Upper Ext  Rt Shoulder External Rotation;Rt Shoulder Internal Rotation  -CN (r) NC (t) CN (c)    Lt Upper Ext  Lt Shoulder Internal Rotation;Lt Shoulder External Rotation  -CN (r) NC (t) CN (c)       MMT Right Upper Ext    Rt Shoulder Internal Rotation MMT, Gross Movement  (4-/5) good minus painful  -CN (r) NC (t) CN (c)    Rt Shoulder External Rotation MMT, Gross Movement  (3+/5) fair plus painful  -CN (r) NC (t) CN (c)       MMT Left Upper Ext    Lt Shoulder Internal Rotation MMT, Gross Movement  (5/5) normal  -CN (r) NC (t) CN (c)    Lt Shoulder External Rotation MMT, Gross Movement  (5/5) normal  -CN (r) NC (t) CN (c)      User Key  (r) = Recorded By, (t) = Taken By, (c) = Cosigned By    Initials Name Provider Type    Ros Weber, PT Physical Therapist    Felix Rodas, PT Student PT Student                      Therapy Education  Education Details: Pt educated importance of HEP to keep shoulder moving. Advised to continue icing and stretching after hot shower.  Given: HEP, Symptoms/condition management  Program: New  How Provided: Verbal  Provided to: Patient  Level of Understanding: Verbalized     PT OP Goals     Row Name 07/06/21 1200          PT Short Term Goals    STG Date to Achieve  08/06/21  -CN (r) NC (t) CN (c)     STG 1  Pt will report pain rated 5/10 at worst in order to demonstrate ability to return to normalized ADLs and functional activities.  -CN (r) NC (t) CN (c)     STG 1 Progress  New  -CN (r) NC (t) CN (c)     STG 2  Pt will be independent with initial HEP for symptom management.  -CN (r) NC (t) CN (c)     STG 2 Progress  New  -CN (r) NC (t) CN (c)     STG 3  Pt will demonstrate bilateral 50 deg of cervical rotation to improve ability to drive safely.  -CN (r) NC (t) CN (c)     STG 3 Progress  New  -CN (r) NC (t) CN (c)     STG 4  Pt will report 50% decrease in tingling in R hand in order to improve ability to perform job tasks  (Pended)   -NC     STG 4  Progress  New  (Pended)   -NC        Long Term Goals    LTG Date to Achieve  09/06/21  -CN (r) NC (t) CN (c)     LTG 1  Pt will be independent and compliant with advanced HEP for long term management of symptoms and prevention of future occurrence.  -CN (r) NC (t) CN (c)     LTG 1 Progress  New  -CN (r) NC (t) CN (c)     LTG 2  Pt will reduce level of perceived disability as measured by the DASH  to 40% in order to improve QOL where 0% is no disability.  -CN (r) NC (t) CN (c)     LTG 2 Progress  New  -CN (r) NC (t) CN (c)     LTG 3  Pt will report performing activities at work with no greater than 1/10 pain to improve independence at work.  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  -CN     LTG 4 Progress  New  -CN (r) NC (t) CN (c)     LTG 5  Pt will report sleeping through the night without medication assist in order to improve QOL  (Pended)   -NC     LTG 5 Progress  New  (Pended)   -NC        Time Calculation    PT Goal Re-Cert Due Date  08/06/21  -CN (r) NC (t) CN (c)       User Key  (r) = Recorded By, (t) = Taken By, (c) = Cosigned By    Initials Name Provider Type    Ros Weber, PT Physical Therapist    Felix Rodas, PT Student PT Student          PT Assessment/Plan     Row Name 07/06/21 1227          PT Assessment    Functional Limitations  Limitation in home management;Limitations in functional capacity and performance;Performance in leisure activities;Performance in self-care ADL;Performance in work activities  -CN (r) NC (t) CN (c)     Impairments  Impaired muscle endurance;Impaired muscle power;Motor function;Muscle strength;Pain;Posture;Range of motion;Poor body mechanics  -CN (r) NC (t) CN (c)     Assessment Comments  56 y.o. female referred to outpatient physical therapy for evaluation and treatment of right shoulder.  Patient  presents with increased R shoulder pain, significant muscle guarding, Limited A/PROM due to pain, R shoulder weakness due to pain. Pt's signs and symptoms are consistent with referring diagnosis. Difficulty with objective measures per high pain levels and poor tolerance to testing. Pt scored 61.36% on the DASH where 0% is no disability and is in evolving clinical condition. Pt would benefit from skilled PT to address functional deficits and return to PLOF.  (Pended)   -NC     Please refer to paper survey for additional self-reported information  Yes  -CN (r) NC (t) CN (c)     Rehab Potential  Good  -CN (r) NC (t) CN (c)     Patient/caregiver participated in establishment of treatment plan and goals  Yes  -CN (r) NC (t) CN (c)     Patient would benefit from skilled therapy intervention  Yes  -CN (r) NC (t) CN (c)        PT Plan    PT Frequency  2x/week  -CN (r) NC (t) CN (c)     Predicted Duration of Therapy Intervention (PT)  8-10  -CN (r) NC (t) CN (c)     Planned CPT's?  PT EVAL LOW COMPLEXITY: 91028;PT RE-EVAL: 36532;PT THER PROC EA 15 MIN: 30428;PT THER ACT EA 15 MIN: 47847;PT MANUAL THERAPY EA 15 MIN: 79278;PT NEUROMUSC RE-EDUCATION EA 15 MIN: 02228;PT GAIT TRAINING EA 15 MIN: 36846;PT AQUATIC THERAPY EA 15 MIN: 93417;PT HOT OR COLD PACK TREAT MCARE;PT ELECTRICAL STIM UNATTEND: ;PT TRACTION CERVICAL: 25004;PT HOT/COLD PACK WC NONMCARE: 92290;PT THER PROC GROUP: 00788;PT THER MASS EA 15 MIN: 88775;PT THER SUPP EA 15 MIN  -CN (r) NC (t) CN (c)     PT Plan Comments  Assess repsonse to HEP and Evaluation. May try pulleys for w/u, cervical distraction, AAROM flexion/ABD/ER for R shoulder, SA Punches, seated thoracic ext in chair  -CN (r) NC (t) CN (c)       User Key  (r) = Recorded By, (t) = Taken By, (c) = Cosigned By    Initials Name Provider Type    Ros Weber, PT Physical Therapist    Felix Rodas, PT Student PT Student            OP Exercises     Row Name 07/06/21 1100              Total Minutes    69665 - PT Therapeutic Exercise Minutes  10  -CN (r) NC (t) CN (c)         Exercise 1    Exercise Name 1  Supine Scap Squeeze  -CN (r) NC (t) CN (c)      Cueing 1  Verbal  -CN (r) NC (t) CN (c)      Sets 1  1  -CN (r) NC (t) CN (c)      Reps 1  10  -CN (r) NC (t) CN (c)         Exercise 2    Exercise Name 2  Supine Cervical rotation  -CN (r) NC (t) CN (c)      Cueing 2  Verbal  -CN (r) NC (t) CN (c)      Sets 2  1e  -CN (r) NC (t) CN (c)      Reps 2  10 deep breaths  -CN (r) NC (t) CN (c)      Additional Comments  Rotate to stretch and take breaths.  -CN (r) NC (t) CN (c)         Exercise 3    Exercise Name 3  SL R shoulder flexion  -CN (r) NC (t) CN (c)      Cueing 3  Verbal  -CN (r) NC (t) CN (c)      Sets 3  1  -CN (r) NC (t) CN (c)      Reps 3  5  -CN (r) NC (t) CN (c)      Additional Comments  Cue scap squeeze  -CN (r) NC (t) CN (c)        User Key  (r) = Recorded By, (t) = Taken By, (c) = Cosigned By    Initials Name Provider Type    Ros Weber, PT Physical Therapist    Felix Rodas, ANDREW Student PT Student                        Outcome Measure Options: Quick DASH  Quick DASH  Open a tight or new jar.: Mild Difficulty  Do heavy household chores (e.g., wash walls, wash floors): Moderate Difficulty  Carry a shopping bag or briefcase: Mild Difficulty  Wash your back: Severe Difficulty  Use a knife to cut food: Mild Difficulty  Recreational activities in which you take some force or impact through your arm, should or hand (e.g. golf, hammering, tennis, etc.): Unable  During the past week, to what extent has your arm, shoulder, or hand problem interfered with your normal social activites with family, friends, neighbors or groups?: Quite a bit  During the past week, were you limited in your work or other regular daily activities as a result of your arm, shoulder or hand problem?: Very limited  Arm, Shoulder, or hand pain: Severe  Tingling (pins and needles) in your arm, shoulder,  or hand: Severe  During the past week, how much difficulty have you had sleeping because of the pain in your arm, shoulder or hand?: Severe Difficulty  Number of Questions Answered: 11  Quick DASH Score: 61.36         Time Calculation:     Start Time: 1015  Stop Time: 1100  Time Calculation (min): 45 min  Timed Charges  53065 - PT Therapeutic Exercise Minutes: 10  Total Minutes  Timed Charges Total Minutes: 10   Total Minutes: 10     Therapy Charges for Today     Code Description Service Date Service Provider Modifiers Qty    48352296348  PT THER PROC EA 15 MIN 7/6/2021 Felix Kaiser, PT Student GP 1    [id number]  PT EVAL LOW COMPLEXITY 2 7/6/2021 Felix Kaiser, PT Student GP 1          PT G-Codes  Outcome Measure Options: Quick DASH  Quick DASH Score: 61.36         FELIX KAISER, PT Student  7/6/2021

## 2021-07-06 NOTE — TELEPHONE ENCOUNTER
Caller: Kalpana Stringer    Relationship: Self    Best call back number: [phone]     What orders are you requesting (i.e. lab or imaging): MRI     Additional notes: PATIENT STATES THAT SHE CANNOT GET AN MRI FOR SHOULDER UNTIL THE END OF THE MONTH. PATIENT STATES SHE IS OFF WORK UNTIL Monday, BUT DOES NOT HAVE THE RANGE OF MOTION NEEDED FOR HER JOB. 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 PATIENT WOULD LIKE TO KNOW IF THERE IS SOMEWHERE THAT SHE CAN GET IN THIS WEEK FOR AN MRI. PATIENT DOES NOT KNOW WHAT TO DO REGARDING WORK AND WOULD LIKE A CALL BACK

## 2021-07-07 NOTE — TELEPHONE ENCOUNTER
PATIENT CALLED BACK AND SAID THAT SHE WAS ABLE TO GET A MRI APPOINTMENT WITH A DIFFERENT PROVIDER AND IS SCHEDULED FOR 7/14/21. PATIENT HAS TO RETURN TO WORK ON 7/12/21. SHE ASKED IF DR. FELDMAN COULD CALL HER TO SEE IF SHE COULD EITHER GET A NOTE FOR WEIGHT RESTRICTION OR GET A DOCTOR'S NOTE TO BE OFF UNTIL SHE GETS THE MRI RESULTS.     PLEASE ADVISE.

## 2021-07-09 ENCOUNTER — TELEPHONE (OUTPATIENT)
Dept: INTERNAL MEDICINE | Facility: CLINIC | Age: 56
End: 2021-07-09

## 2021-07-09 NOTE — TELEPHONE ENCOUNTER
Patient stated that she is unable to get an MRI until 7/14/21.  She is still having extreme pain and will need an additional work note to be off (she would like to have this picked up today and is asking for at least a week until the results of the MRI are back) and will also need to have a short-term disability form filled out. Appointment made for patient to be seen on Monday.  Please advise.

## 2021-07-12 ENCOUNTER — OFFICE VISIT (OUTPATIENT)
Dept: INTERNAL MEDICINE | Facility: CLINIC | Age: 56
End: 2021-07-12

## 2021-07-12 VITALS
SYSTOLIC BLOOD PRESSURE: 136 MMHG | WEIGHT: 206.9 LBS | DIASTOLIC BLOOD PRESSURE: 70 MMHG | OXYGEN SATURATION: 99 % | HEIGHT: 70 IN | BODY MASS INDEX: 29.62 KG/M2 | HEART RATE: 71 BPM

## 2021-07-12 DIAGNOSIS — M25.511 ACUTE PAIN OF RIGHT SHOULDER: Primary | ICD-10-CM

## 2021-07-12 PROCEDURE — 99214 OFFICE O/P EST MOD 30 MIN: CPT | Performed by: FAMILY MEDICINE

## 2021-07-12 NOTE — PROGRESS NOTES
"Chief Complaint  follow up to shoulder pain, FMLA, and short-term disability    Subjective          Kalpana Stringer presents to [facility]  History of Present Illness  Follow-up visit for right shoulder 7 week heard pop working out at gym feels like clamp anterior to posterior.  Initial consultation with physical therapy for range of motion exercises  X-ray reveals some cortical thickening suspicious for bone cyst MRI pending  Objective   Vital Signs:   /70 (BP Location: Left arm, Patient Position: Sitting, Cuff Size: Large Adult)   Pulse 71   Ht 177.8 cm (70\")   Wt 93.8 kg (206 lb 14.4 oz)   SpO2 99%   BMI 29.69 kg/m²     Physical Exam  Constitutional:       Appearance: Normal appearance.   Musculoskeletal:         General: Tenderness present.      Comments: Right shoulder decreased range of motion with anterior to posterior tenderness distal neurovascular supply is grossly intact using a brace support   Neurological:      Mental Status: She is alert.   Psychiatric:         Mood and Affect: Mood normal.         Behavior: Behavior normal.         Thought Content: Thought content normal.         Judgment: Judgment normal.     Had extensive discussion regarding return to work and various work requirements she can and cannot fulfill.  Filled out for her anticipated return to work with no significant pushing pulling or lifting.  Form scanned into media for [employer]  Result Review :                 Assessment and Plan    Diagnoses and all orders for this visit:    1. Acute pain of right shoulder (Primary)    Patient has MRI pending July 14 for her right shoulder  Continue use of sling support  Further treatment based on MRI whether physical therapy or surgery    Anticipate temporary return to work date Aug 2 , 2021    I spent 40 minutes caring for Kalpana on this date of service. This time includes time spent by me in the following activities:preparing for the visit, reviewing tests, " performing a medically appropriate examination and/or evaluation , counseling and educating the patient/family/caregiver, documenting information in the medical record and care coordination  Follow Up   No follow-ups on file.  Patient was given instructions and counseling regarding her condition or for health maintenance advice. Please see specific information pulled into the AVS if appropriate.       Answers for HPI/ROS submitted by the patient on 7/10/2021  Please describe your symptoms.: Follow up for injured shoulder  Have you had these symptoms before?: Yes  How long have you been having these symptoms?: Greater than 2 weeks  Please list any medications you are currently taking for this condition.: Tizanidine and Aleve  Please describe any probable cause for these symptoms. : Gym injury about 7 weeks ago that continued to get worse  What is the primary reason for your visit?: Other

## 2021-07-15 ENCOUNTER — HOSPITAL ENCOUNTER (OUTPATIENT)
Dept: PHYSICAL THERAPY | Facility: HOSPITAL | Age: 56
Setting detail: THERAPIES SERIES
Discharge: HOME | End: 2021-07-15

## 2021-07-15 DIAGNOSIS — M25.511 ACUTE PAIN OF RIGHT SHOULDER: Primary | ICD-10-CM

## 2021-07-15 DIAGNOSIS — R20.2 TINGLING OF RIGHT UPPER EXTREMITY: ICD-10-CM

## 2021-07-15 DIAGNOSIS — M25.611 DECREASED ROM OF RIGHT SHOULDER: ICD-10-CM

## 2021-07-15 PROCEDURE — 97140 MANUAL THERAPY 1/> REGIONS: CPT

## 2021-07-15 PROCEDURE — 97110 THERAPEUTIC EXERCISES: CPT

## 2021-07-15 NOTE — THERAPY TREATMENT NOTE
Outpatient Physical Therapy Ortho Treatment Note  [facility]     Patient Name: Kalpana Stringer  : 1965  MRN: [id number]  Today's Date: 7/15/2021      Visit Date: 07/15/2021    Visit Dx:    ICD-10-CM ICD-9-CM   1. Acute pain of right shoulder  M25.511 719.41   2. Tingling of right upper extremity  R20.2 782.0   3. Decreased ROM of right shoulder  M25.611 719.51       Patient Active Problem List   Diagnosis   • Anxiety   • HLD (hyperlipidemia)   • Muscle ache   • Stenosis of subclavian artery (CMS/HCC)   • Avitaminosis D   • Arthritis   • Gastroesophageal reflux disease   • Lung nodule   • Colon cancer screening   • Rectal bleeding   • Chronic bilateral low back pain without sciatica   • Skin lesions   • Acute pain of right shoulder        Past Medical History:   Diagnosis Date   • Anxiety    • Atypical chest pain    • Chest tightness    • Hyperlipidemia    • Nodule of right lung    • Smoker    • Subclavian artery stenosis (CMS/HCC)         Past Surgical History:   Procedure Laterality Date   • TUBAL ABDOMINAL LIGATION                         PT Assessment/Plan     Row Name 07/15/21 1529          PT Assessment    Assessment Comments  Ms. Stringer returns for her 2nd visit for R shoulder pain, with pain rating at 7/10.  Pt continues with restriction of R shoulder in all planes.  R. UT trigger point responded well to STM.  -LP     Please refer to paper survey for additional self-reported information  Yes  -LP     Rehab Potential  Good  -LP     Patient/caregiver participated in establishment of treatment plan and goals  Yes  -LP     Patient would benefit from skilled therapy intervention  Yes  -LP        PT Plan    PT Frequency  2x/week  -LP     PT Plan Comments  Continue to work on ROM and strengthening within her tolerance.  -LP       User Key  (r) = Recorded By, (t) = Taken By, (c) = Cosigned By    Initials Name Provider Type    LP Jennifer Muse, PT Physical Therapist            OP Exercises     Row  Name 07/15/21 0900             Subjective Comments    Subjective Comments  got the MRI, waiting for Dr. Rios to call me to let me know what it showed.   -LP         Subjective Pain    Able to rate subjective pain?  yes  -LP      Pre-Treatment Pain Level  7  -LP         Total Minutes    33758 - PT Therapeutic Exercise Minutes  30  -LP      12686 - PT Manual Therapy Minutes  12  -LP         Exercise 1    Exercise Name 1  seated/standing scap retraction  -LP      Cueing 1  Verbal  -LP      Sets 1  1  -LP      Reps 1  10  -LP         Exercise 2    Exercise Name 2  Supine Cervical rotation  -LP      Cueing 2  Verbal  -LP      Reps 2  3  -LP      Time 2  10s  -LP         Exercise 4    Exercise Name 4  A-AROM flex with stick  -LP      Cueing 4  Verbal;Tactile;Demo  -LP      Sets 4  1  -LP      Reps 4  5  -LP      Time 4  3s  -LP         Exercise 5    Exercise Name 5  A-AROM abd with a stick  -LP      Cueing 5  Verbal;Tactile;Demo  -LP      Sets 5  1  -LP      Reps 5  5  -LP      Time 5  3s  -LP         Exercise 6    Exercise Name 6  shldr rolls  -LP      Cueing 6  Verbal;Tactile;Demo  -LP      Sets 6  1  -LP      Reps 6  10  -LP      Time 6  3s  -LP         Exercise 7    Exercise Name 7  Supine Bicep curls  -LP      Cueing 7  Verbal;Tactile;Demo  -LP      Sets 7  1  -LP      Reps 7  10  -LP      Time 7  3s    -LP      Additional Comments  towel under arm for support- no weight  -LP         Exercise 8    Exercise Name 8  Pendulums  -LP      Cueing 8  Verbal;Tactile;Demo  -LP      Sets 8  1  -LP      Reps 8  10  -LP      Additional Comments  small circles  -LP        User Key  (r) = Recorded By, (t) = Taken By, (c) = Cosigned By    Initials Name Provider Type    LP Jennifer Muse, PT Physical Therapist                      Manual Rx (last 36 hours)      Manual Treatments     Row Name 07/15/21 0900             Total Minutes    88413 - PT Manual Therapy Minutes  12  -LP         Manual Rx 1    Manual Rx 1 Location  Cerv/UT  mm  -LP      Manual Rx 1 Type  STM to R UT, Levator mm.  -LP      Manual Rx 1 Grade  gentle to mod  -LP      Manual Rx 1 Duration  12 min  -LP        User Key  (r) = Recorded By, (t) = Taken By, (c) = Cosigned By    Initials Name Provider Type    LP Jennifer Muse PT Physical Therapist              Therapy Education  Education Details: anatomy of the shoulder  Importance of posture for good sholder mechanics  Given: HEP, Symptoms/condition management, Posture/body mechanics  Program: New, Reinforced, Progressed  How Provided: Verbal, Demonstration, Written  Provided to: Patient  Level of Understanding: Teach back education performed              Time Calculation:   Start Time: 0915  Stop Time: 1000  Time Calculation (min): 45 min  Timed Charges  00684 - PT Therapeutic Exercise Minutes: 30  91209 - PT Manual Therapy Minutes: 12  Total Minutes  Timed Charges Total Minutes: 42   Total Minutes: 42  Therapy Charges for Today     Code Description Service Date Service Provider Modifiers Qty    16035312713  PT THER PROC EA 15 MIN 7/15/2021 Jennifer Muse, PT GP 2    [id number]  PT MANUAL THERAPY EA 15 MIN 7/15/2021 Jennifer Muse PT GP 1                    Jennifer Muse PT  7/15/2021

## 2021-07-19 ENCOUNTER — TELEPHONE (OUTPATIENT)
Dept: INTERNAL MEDICINE | Facility: CLINIC | Age: 56
End: 2021-07-19

## 2021-07-19 NOTE — TELEPHONE ENCOUNTER
PATIENT NEEDS DIFFERENT PAPERWORK FILLED OUT FOR SHORT TERM DISABILITY FOR HER SHOULDER. THEY SENT HER NEW AND CORRECT PAPERS. PLEASE LET HER KNOW IF SHE NEEDS TO COME IN OR IF SHE CAN FAX THOSE TO YOU. ALSO SHE IS CHECKING ON HER MRI RESULTS SO SHE IS NEEDING A CALL BACK .    [phone]

## 2021-07-20 NOTE — TELEPHONE ENCOUNTER
PATIENT CALLING ABOUT MRI RESULTS SHE HAD ON 07/14/21 AT [facility]    ALSO HAS OTHER PAPERWORK FOR SHORT TERM DISABILITY THAT NEEDS FILLED OUT.    PLEASE ADVISE  [phone]

## 2021-07-23 ENCOUNTER — OFFICE VISIT (OUTPATIENT)
Dept: INTERNAL MEDICINE | Facility: CLINIC | Age: 56
End: 2021-07-23

## 2021-07-23 VITALS
HEIGHT: 70 IN | HEART RATE: 61 BPM | BODY MASS INDEX: 29.48 KG/M2 | OXYGEN SATURATION: 99 % | WEIGHT: 205.9 LBS | TEMPERATURE: 98.6 F | RESPIRATION RATE: 16 BRPM | SYSTOLIC BLOOD PRESSURE: 124 MMHG | DIASTOLIC BLOOD PRESSURE: 72 MMHG

## 2021-07-23 DIAGNOSIS — M67.911 TENDINOPATHY OF RIGHT ROTATOR CUFF: Primary | ICD-10-CM

## 2021-07-23 DIAGNOSIS — M25.511 ACUTE PAIN OF RIGHT SHOULDER: ICD-10-CM

## 2021-07-23 PROCEDURE — 99213 OFFICE O/P EST LOW 20 MIN: CPT | Performed by: FAMILY MEDICINE

## 2021-07-23 NOTE — PROGRESS NOTES
"Chief Complaint  Shoulder Pain    Subjective          Kalpana Stringer presents to [facility]  History of Present Illness  Patient follows up for further treatment plan development for right shoulder pain the seal seem to start back in April or May of this year she is presently involved with physical therapy sports medicine and chiropractic care has some as cervical spinous process arthropathy as well as found to have tendon adenopathy on MRI  Will have some decreased range of motion with pain in the right shoulder  Objective   Vital Signs:   /72 (BP Location: Right arm, Patient Position: Sitting, Cuff Size: Adult)   Pulse 61   Temp 98.6 °F (37 °C)   Resp 16   Ht 177.8 cm (70\")   Wt 93.4 kg (205 lb 14.4 oz)   SpO2 99%   BMI 29.54 kg/m²     Physical Exam  Vitals reviewed.   Constitutional:       Appearance: Normal appearance.   HENT:      Head: Normocephalic and atraumatic.   Cardiovascular:      Rate and Rhythm: Normal rate and regular rhythm.      Pulses: Normal pulses.      Heart sounds: Normal heart sounds.   Pulmonary:      Effort: Pulmonary effort is normal.      Breath sounds: Normal breath sounds.   Musculoskeletal:         General: Tenderness present.      Comments: Right shoulder decreased range of motion   Neurological:      Mental Status: She is alert.   Psychiatric:         Mood and Affect: Mood normal.         Behavior: Behavior normal.         Thought Content: Thought content normal.         Judgment: Judgment normal.        Result Review :                 Assessment and Plan    Diagnoses and all orders for this visit:    1. Tendinopathy of right rotator cuff (Primary)    2. Acute pain of right shoulder    Will benefit with steroid pack  Continue naproxen anti-inflammatory as well as GERD protection with PPI  Continue treatment with ancillary services including chiropractic care and physical therapy    Follow Up   Return in about 1 month (around 8/23/2021), or " if symptoms worsen or fail to improve.  Patient was given instructions and counseling regarding her condition or for health maintenance advice. Please see specific information pulled into the AVS if appropriate.

## 2021-07-27 ENCOUNTER — APPOINTMENT (OUTPATIENT)
Dept: MRI IMAGING | Facility: HOSPITAL | Age: 56
End: 2021-07-27

## 2021-07-28 DIAGNOSIS — M67.911 TENDINOPATHY OF RIGHT SHOULDER: Primary | ICD-10-CM

## 2021-07-28 DIAGNOSIS — M75.51 ACUTE BURSITIS OF RIGHT SHOULDER: ICD-10-CM

## 2021-07-30 RX ORDER — TIZANIDINE HYDROCHLORIDE 2 MG/1
CAPSULE, GELATIN COATED ORAL
Qty: 90 CAPSULE | Refills: 1 | Status: SHIPPED | OUTPATIENT
Start: 2021-07-30 | End: 2022-05-11

## 2021-07-30 RX ORDER — OMEPRAZOLE 40 MG/1
CAPSULE, DELAYED RELEASE ORAL
Qty: 90 CAPSULE | Refills: 3 | Status: SHIPPED | OUTPATIENT
Start: 2021-07-30 | End: 2022-08-29 | Stop reason: SDUPTHER

## 2021-09-03 ENCOUNTER — TELEPHONE (OUTPATIENT)
Dept: INTERNAL MEDICINE | Facility: CLINIC | Age: 56
End: 2021-09-03

## 2021-09-03 NOTE — TELEPHONE ENCOUNTER
Caller: Kalpana Stringer    Relationship: Self    Best call back number: [phone]     What medication are you requesting: STRONG COUGHING MEDICATION    What are your current symptoms: TESTED POSITIVE FOR COVID BAD COUGHING USED ALL SORTS OF OTC MEDICATIONS NOTHING HELPS     How long have you been experiencing symptoms: OVER 1 WEEK    Have you had these symptoms before:    [x] Yes  [] No    Have you been treated for these symptoms before:   [x] Yes  [] No    If a prescription is needed, what is your preferred pharmacy and phone number:    [id number] - [facility], [place] - [address]. AT [facility] - [phone] [facility] [phone] FX

## 2021-09-07 ENCOUNTER — TELEPHONE (OUTPATIENT)
Dept: INTERNAL MEDICINE | Facility: CLINIC | Age: 56
End: 2021-09-07

## 2021-09-08 ENCOUNTER — HOSPITAL ENCOUNTER (EMERGENCY)
Facility: HOSPITAL | Age: 56
Discharge: HOME | End: 2021-09-08
Attending: EMERGENCY MEDICINE | Admitting: EMERGENCY MEDICINE

## 2021-09-08 ENCOUNTER — APPOINTMENT (OUTPATIENT)
Dept: GENERAL RADIOLOGY | Facility: HOSPITAL | Age: 56
End: 2021-09-08

## 2021-09-08 VITALS
RESPIRATION RATE: 22 BRPM | WEIGHT: 212.6 LBS | TEMPERATURE: 98 F | BODY MASS INDEX: 30.43 KG/M2 | HEART RATE: 90 BPM | OXYGEN SATURATION: 94 % | HEIGHT: 70 IN | DIASTOLIC BLOOD PRESSURE: 83 MMHG | SYSTOLIC BLOOD PRESSURE: 134 MMHG

## 2021-09-08 DIAGNOSIS — U07.1 PNEUMONIA DUE TO COVID-19 VIRUS: Primary | ICD-10-CM

## 2021-09-08 DIAGNOSIS — J12.82 PNEUMONIA DUE TO COVID-19 VIRUS: Primary | ICD-10-CM

## 2021-09-08 PROCEDURE — 71045 X-RAY EXAM CHEST 1 VIEW: CPT

## 2021-09-08 PROCEDURE — 99283 EMERGENCY DEPT VISIT LOW MDM: CPT

## 2021-09-08 NOTE — ED TRIAGE NOTES
Patient to er from home with c/o she tested positive with covid on 08/27/2021. Patient reported she is still having increase in coughing and soa. Patient has mask on in triage along with staff.

## 2021-09-08 NOTE — ED NOTES
"Pt reports testing positive for COVID on 8/27. Reports feeling better then \"getting worse\" Monday 9/6 with SOA, cough, weakness. Pt reports chronic asthma. Pt did not receive COVID vaccines. Pt sat 95% on RA. Pt walking sat 94% RA.         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 Pt wearing mask.       Jory Mcknight, RN  09/08/21 0257    "

## 2021-09-08 NOTE — ED PROVIDER NOTES
EMERGENCY DEPARTMENT ENCOUNTER    Room Number:  28/28  Date of encounter:  9/8/2021  PCP: Junior Rios MD  Historian: Patient     I used full protective equipment while examining this patient.  This includes face mask, gloves and protective eyewear.  I washed my hands before entering the room and immediately upon leaving the room      HPI:  Chief Complaint: Shortness of breath  A complete HPI/ROS/PMH/PSH/SH/FH are unobtainable due to: None    Context: Kalpana Stringer is a 56 y.o. female who presents to the ED c/o shortness of breath.  Patient was diagnosed with Covid on August 26 and is continue to have cough and shortness of breath.  Symptoms are moderate and worsened with cough and exertion.      MEDICAL RECORD REVIEW  I reviewed prior medical records including recent office notes and urgent care evaluation with diagnosis of Covid pneumonia.    PAST MEDICAL HISTORY  Active Ambulatory Problems     Diagnosis Date Noted   • Anxiety 04/28/2016   • HLD (hyperlipidemia) 04/28/2016   • Muscle ache 04/28/2016   • Stenosis of subclavian artery (CMS/HCC) 04/28/2016   • Avitaminosis D 04/28/2016   • Arthritis 04/28/2016   • Gastroesophageal reflux disease 01/12/2017   • Lung nodule 07/27/2018   • Colon cancer screening 02/15/2019   • Rectal bleeding 09/30/2019   • Chronic bilateral low back pain without sciatica 06/01/2020   • Skin lesions 06/01/2020   • Acute pain of right shoulder 07/01/2021   • Tendinopathy of right rotator cuff 07/23/2021     Resolved Ambulatory Problems     Diagnosis Date Noted   • No Resolved Ambulatory Problems     Past Medical History:   Diagnosis Date   • Asthma    • Atypical chest pain    • Chest tightness    • Hyperlipidemia    • Nodule of right lung    • Smoker    • Subclavian artery stenosis (CMS/HCC)          PAST SURGICAL HISTORY  Past Surgical History:   Procedure Laterality Date   • TUBAL ABDOMINAL LIGATION           FAMILY HISTORY  Family History   Problem Relation Age of Onset   •  Diabetes Mother    • Hyperlipidemia Mother    • Hypertension Mother    • Stroke Mother    • Hyperlipidemia Father    • Hypertension Father    • Arthritis Father         Rheumatoid   • Fibromyalgia Sister    • Cervical cancer Paternal Aunt          SOCIAL HISTORY  Social History     Socioeconomic History   • Marital status: Single     Spouse name: Not on file   • Number of children: 2   • Years of education: Not on file   • Highest education level: Not on file   Tobacco Use   • Smoking status: Former Smoker     Types: Cigarettes   • Smokeless tobacco: Never Used   Substance and Sexual Activity   • Alcohol use: Yes     Comment: Socially   • Drug use: No   • Sexual activity: Not Currently     Partners: Male     Birth control/protection: Surgical         ALLERGIES  Patient has no known allergies.       REVIEW OF SYSTEMS  Review of Systems   Constitutional: Positive for fatigue and fever.   HENT: Negative.  Negative for sore throat.    Eyes: Negative.    Respiratory: Positive for cough and shortness of breath.    Cardiovascular: Negative.  Negative for chest pain.   Gastrointestinal: Negative.    Genitourinary: Negative.  Negative for dysuria.   Musculoskeletal: Positive for myalgias. Negative for back pain.   Skin: Negative.  Negative for rash.   Neurological: Negative.  Negative for headaches.   Psychiatric/Behavioral: Suicidal ideas: .discharge.   All other systems reviewed and are negative.          PHYSICAL EXAM    I have reviewed the triage vital signs and nursing notes.    ED Triage Vitals   Temp Heart Rate Resp BP SpO2   09/08/21 1056 09/08/21 1056 09/08/21 1056 09/08/21 1058 09/08/21 1056   98 °F (36.7 °C) 112 22 132/78 94 %      Temp src Heart Rate Source Patient Position BP Location FiO2 (%)   09/08/21 1056 -- 09/08/21 1058 -- --   Temporal  Standing         Physical Exam  GENERAL: Alert female in no obvious distress.  Triage vitals reviewed notable for O2 saturation 94%.  Pulse 112.  Blood pressure  132/78.  HENT: nares patent  EYES: no scleral icterus  CV: regular rhythm, regular rate-no murmur, heart rate in the 90s at bedside  RESPIRATORY: normal effort, clear to auscultation bilaterally-O2 saturations 95% on room air  ABDOMEN: soft, nontender  MUSCULOSKELETAL: no deformity-no segment swelling or tenderness to palpation  NEURO: Strength, sensation, and coordination are grossly intact.  Speech and mentation are unremarkable  SKIN: warm, dry-no unusual rashes are noted      RADIOLOGY  XR Chest 1 View    Result Date: 9/8/2021  XR CHEST 1 VW-  HISTORY: Female who is 56 years-old,  Covid positive, short of breath, cough  TECHNIQUE: Frontal view of the chest  COMPARISON: 07/14/2018  FINDINGS: Heart, mediastinum and pulmonary vasculature are unremarkable. Infiltrates bilaterally, predominantly at the mid to lower lungs, although nonspecific, are compatible with Covid pneumonia, follow-up recommended. No pleural effusion, or pneumothorax. Right hemidiaphragm is elevated. No acute osseous process.      Bilateral infiltrates, predominantly at the mid to lower lungs, although nonspecific, are compatible with Covid pneumonia, follow-up recommended.  This report was finalized on 9/8/2021 2:32 PM by Dr. Nino Veras M.D.        I ordered the above noted radiological studies. Reviewed by me and discussed with radiologist.  See dictation for official radiology interpretation.      PROCEDURES  Procedures      MEDICATIONS GIVEN IN ER    Medications - No data to display      PROGRESS, DATA ANALYSIS, CONSULTS, AND MEDICAL DECISION MAKING    All labs have been independently reviewed by me.  All radiology studies have been reviewed by me and discussed with radiologist dictating the report.   EKG's independently viewed and interpreted by me.  Discussion below represents my analysis of pertinent findings related to patient's condition, differential diagnosis, treatment plan and final disposition.      ED Course as of Sep 08  1516   Wed Sep 08, 2021   1505 X-ray reviewed with reading radiologist does show findings consistent with mild to moderate Covid pneumonia.  O2 saturations at bedside remain in the mid 90s on room air.  At this point unfortunately I do not think we have much to offer here in the hospital.  We will try to provide an oxygen saturation monitor with patient.  If she were to become more hypoxic with sats running under 93 routinely she should return to the ED for further treatment.    [DB]      ED Course User Index  [DB] Rishi Katz MD       AS OF 15:16 EDT VITALS:    BP - 141/75  HR - 92  TEMP - 98 °F (36.7 °C) (Temporal)  O2 SATS - 95%      DIAGNOSIS  Final diagnoses:   Pneumonia due to COVID-19 virus         DISPOSITION  DISCHARGE    Patient discharged in stable condition.    Reviewed implications of results, diagnosis, meds, responsibility to follow up, warning signs and symptoms of possible worsening, potential complications and reasons to return to ER, including increased chest pain, shortness of breath or as needed.    Patient/Family voiced understanding of above instructions.    Discussed plan for discharge, as there is no emergent indication for admission. Patient referred to primary care provider for BP management due to today's BP. Pt/family is agreeable and understands need for follow up and repeat testing.  Pt is aware that discharge does not mean that nothing is wrong but it indicates no emergency is present that requires admission and they must continue care with follow-up as given below or physician of their choice.     FOLLOW-UP  Junior Rios MD  [id number] [id number] [phone]    In 3 days  If Not Better         Medication List      No changes were made to your prescriptions during this visit.                Rishi Katz MD  09/08/21 1516

## 2021-09-08 NOTE — ED NOTES
.Pt masked, RN wearing n95 mask, goggles, faceshield, hair covering, gown, and gloves during encounter.       Leigha Killian, RN  09/08/21 1870

## 2021-09-08 NOTE — DISCHARGE INSTRUCTIONS
If we have one available I will try to get to a pulse ox monitor.  If not I would recommend getting 1 from medical supplier [employer].  If your oxygen saturations should fall persistently below 93% do not hesitate to return for possible oxygen therapy.  Otherwise unfortunately are no great treatments for Covid infection.

## 2021-09-09 ENCOUNTER — READMISSION MANAGEMENT (OUTPATIENT)
Dept: CALL CENTER | Facility: HOSPITAL | Age: 56
End: 2021-09-09

## 2021-09-09 NOTE — OUTREACH NOTE
Prep Survey      Responses   [facility]  [place]   Is LACE score < 7 ?  No   Emergency Room discharge w/ pulse ox?  Yes   Eligibility  Readm Mgmt   Discharge diagnosis  Pneumonia due to COVID-19 virus   Does the patient have one of the following disease processes/diagnoses(primary or secondary)?  COVID-19   Does the patient have Home health ordered?  No   Is there a DME ordered?  Yes   What DME was ordered?  sent home with pulse oximeter   General alerts for this patient  ED visit-sent home with pulse oximeter   Prep survey completed?  Yes          Dana Meneses RN

## 2021-09-09 NOTE — OUTREACH NOTE
COVID-19 Week 1 Survey      Responses   [facility] patient discharged from?  [place]   Does the patient have one of the following disease processes/diagnoses(primary or secondary)?  COVID-19   COVID-19 underlying condition?  None   Call Number  Call 1   Week 1 Call successful?  No   Discharge diagnosis  Pneumonia due to COVID-19 virus          Regina Mckenzie RN

## 2021-09-10 ENCOUNTER — READMISSION MANAGEMENT (OUTPATIENT)
Dept: CALL CENTER | Facility: HOSPITAL | Age: 56
End: 2021-09-10

## 2021-09-10 NOTE — OUTREACH NOTE
COVID-19 Week 1 Survey      Responses   [facility] patient discharged from?  [place]   Does the patient have one of the following disease processes/diagnoses(primary or secondary)?  COVID-19   COVID-19 underlying condition?  None   Call Number  Call 2   Week 1 Call successful?  Yes   Call start time  1435   Call end time  1455   Discharge diagnosis  Pneumonia due to COVID-19 virus   Meds reviewed with patient/caregiver?  Yes   Is the patient having any side effects they believe may be caused by any medication additions or changes?  No   Does the patient have all medications ordered at discharge?  Yes   Is the patient taking all medications as directed (includes completed medication regime)?  N/A   Does the patient have a primary care provider?   Yes   Does the patient have an appointment with their PCP or specialist within 7 days of discharge?  No   What is preventing the patient from scheduling follow up appointments within 7 days of discharge?  Haven't had time   Nursing Interventions  Educated patient on importance of making appointment, Advised patient to make appointment   Has the patient kept scheduled appointments due by today?  N/A   Has home health visited the patient within 72 hours of discharge?  N/A   What DME was ordered?  sent home with pulse oximeter   Has all DME been delivered?  Yes   Psychosocial issues?  No   Did the patient receive a copy of their discharge instructions?  Yes   Did the patient receive a copy of COVID-19 specific instructions?  Yes   Nursing interventions  Reviewed instructions with patient   What is the patient's perception of their health status since discharge?  Improving   Does the patient have any of the following symptoms?  Cough [extremely congestive, painful when coughing, Albuterol inhaler helps]   Nursing Interventions  Nurse provided patient education   Pulse Ox monitoring  Intermittent   Pulse Ox device source  Patient   O2 Sat comments  93-99% on RA,  Albuterol inhaler [uses deep breathing exercises]   O2 Sat: education provided  Sat levels, Monitoring frequency, When to seek care   O2 Sat education comments  will go to ED for O2 sats <90%   Is the patient/caregiver able to teach back steps to recovery at home?  Set small, achievable goals for return to baseline health, Rest and rebuild strength, gradually increase activity, Eat a well-balance diet [rotated toothbrush head out]   If the patient is a current smoker, are they able to teach back resources for cessation?  Not a smoker   Is the patient/caregiver able to teach back the hierarchy of who to call/visit for symptoms/problems? PCP, Specialist, Home health nurse, Urgent Care, ED, 911  Yes [low appetite]   COVID-19 call completed?  Yes          Larissa Smith RN

## 2021-09-11 ENCOUNTER — READMISSION MANAGEMENT (OUTPATIENT)
Dept: CALL CENTER | Facility: HOSPITAL | Age: 56
End: 2021-09-11

## 2021-09-11 NOTE — OUTREACH NOTE
COVID-19 Week 1 Survey      Responses   [facility] patient discharged from?  [place]   Does the patient have one of the following disease processes/diagnoses(primary or secondary)?  COVID-19   COVID-19 underlying condition?  None   Call Number  Call 3   Week 1 Call successful?  No   Discharge diagnosis  Pneumonia due to COVID-19 virus   Revoked  No further contact(revokes)-requires comment [er visit x 3 calls]   Is the patient interested in additional calls from an ambulatory ?  NOTE:  applies to high risk patients requiring additional follow-up.  No          Gaby Wadsworth RN

## 2021-11-08 ENCOUNTER — OFFICE VISIT (OUTPATIENT)
Dept: INTERNAL MEDICINE | Facility: CLINIC | Age: 56
End: 2021-11-08

## 2021-11-08 ENCOUNTER — HOSPITAL ENCOUNTER (OUTPATIENT)
Dept: GENERAL RADIOLOGY | Facility: HOSPITAL | Age: 56
Discharge: HOME | End: 2021-11-08
Admitting: FAMILY MEDICINE

## 2021-11-08 VITALS
WEIGHT: 203.2 LBS | DIASTOLIC BLOOD PRESSURE: 78 MMHG | TEMPERATURE: 97.7 F | OXYGEN SATURATION: 94 % | SYSTOLIC BLOOD PRESSURE: 124 MMHG | HEART RATE: 80 BPM | HEIGHT: 70 IN | BODY MASS INDEX: 29.09 KG/M2

## 2021-11-08 DIAGNOSIS — U07.1 PNEUMONIA DUE TO COVID-19 VIRUS: Primary | ICD-10-CM

## 2021-11-08 DIAGNOSIS — J12.82 PNEUMONIA DUE TO COVID-19 VIRUS: Primary | ICD-10-CM

## 2021-11-08 PROCEDURE — 99213 OFFICE O/P EST LOW 20 MIN: CPT | Performed by: FAMILY MEDICINE

## 2021-11-08 PROCEDURE — 71046 X-RAY EXAM CHEST 2 VIEWS: CPT

## 2021-11-08 RX ORDER — ALBUTEROL SULFATE 90 UG/1
2 AEROSOL, METERED RESPIRATORY (INHALATION) EVERY 4 HOURS PRN
COMMUNITY
End: 2021-11-08 | Stop reason: SDUPTHER

## 2021-11-08 RX ORDER — BENZONATATE 200 MG/1
200 CAPSULE ORAL 3 TIMES DAILY PRN
Qty: 45 CAPSULE | Refills: 0 | Status: SHIPPED | OUTPATIENT
Start: 2021-11-08 | End: 2022-04-29

## 2021-11-08 RX ORDER — ALBUTEROL SULFATE 90 UG/1
2 AEROSOL, METERED RESPIRATORY (INHALATION) EVERY 6 HOURS PRN
Qty: 6.7 G | Refills: 0 | Status: SHIPPED | OUTPATIENT
Start: 2021-11-08 | End: 2022-07-19

## 2021-11-10 NOTE — PROGRESS NOTES
"Chief Complaint  Cough and Insomnia    Subjective          Kalpana Stringer presents to [facility]  History of Present Illness  Patient appointment for cough with recent upper respiratory symptoms no fever sweats or chills minimally productive distant history of Covid in September  She is concerned that cough is persistent  Objective   Vital Signs:   /78 (BP Location: Left arm, Patient Position: Sitting, Cuff Size: Adult)   Pulse 80   Temp 97.7 °F (36.5 °C) (Infrared)   Ht 177.8 cm (70\")   Wt 92.2 kg (203 lb 3.2 oz)   SpO2 94%   BMI 29.16 kg/m²     Physical Exam  Vitals and nursing note reviewed.   Constitutional:       Appearance: Normal appearance.   HENT:      Head: Normocephalic and atraumatic.   Cardiovascular:      Rate and Rhythm: Normal rate and regular rhythm.      Pulses: Normal pulses.      Heart sounds: Normal heart sounds.   Pulmonary:      Effort: Pulmonary effort is normal.      Breath sounds: Normal breath sounds.   Musculoskeletal:      Right lower leg: No edema.      Left lower leg: No edema.   Neurological:      Mental Status: She is alert.   Psychiatric:         Mood and Affect: Mood normal.         Behavior: Behavior normal.         Thought Content: Thought content normal.         Judgment: Judgment normal.        Result Review :     Common labs    Common Labsle 12/8/20 12/8/20 12/8/20    1209 1209 1209   Glucose  106 (A)    BUN  15    Creatinine  0.82    eGFR Non African Am  73    Sodium  139    Potassium  3.9    Chloride  104    Calcium  9.3    Albumin  4.30    Total Bilirubin  0.2    Alkaline Phosphatase  83    AST (SGOT)  37 (A)    ALT (SGPT)  43 (A)    WBC 4.95     Hemoglobin 12.4     Hematocrit 37.5     Platelets 237     Total Cholesterol   222 (A)   Triglycerides   242 (A)   HDL Cholesterol   60   LDL Cholesterol    120 (A)   (A) Abnormal value                      Assessment and Plan    Diagnoses and all orders for this visit:    1. Pneumonia due " to COVID-19 virus (Primary)  -     XR Chest PA & Lateral    Other orders  -     benzonatate (TESSALON) 200 MG capsule; Take 1 capsule by mouth 3 (Three) Times a Day As Needed for Cough.  Dispense: 45 capsule; Refill: 0  -     albuterol sulfate  (90 Base) MCG/ACT inhaler; Inhale 2 puffs Every 6 (Six) Hours As Needed for Wheezing or Shortness of Air.  Dispense: 6.7 g; Refill: 0        Follow Up   Return if symptoms worsen or fail to improve, for Recheck.  Patient was given instructions and counseling regarding her condition or for health maintenance advice. Please see specific information pulled into the AVS if appropriate.

## 2022-01-14 ENCOUNTER — APPOINTMENT (OUTPATIENT)
Dept: GENERAL RADIOLOGY | Facility: HOSPITAL | Age: 57
End: 2022-01-14

## 2022-01-14 PROCEDURE — 73560 X-RAY EXAM OF KNEE 1 OR 2: CPT | Performed by: FAMILY MEDICINE

## 2022-01-20 ENCOUNTER — TELEPHONE (OUTPATIENT)
Dept: INTERNAL MEDICINE | Facility: CLINIC | Age: 57
End: 2022-01-20

## 2022-01-20 NOTE — TELEPHONE ENCOUNTER
Follow-up appointment in this office if no improvement over the 1-2 weeks with recommended further imaging

## 2022-01-20 NOTE — TELEPHONE ENCOUNTER
Caller: Kalpana HILARIO    Relationship: Self    Best call back number: [phone]     What is the best time to reach you: ANY     Who are you requesting to speak with (clinical staff, provider,  specific staff member): MYRA     Do you know the name of the person who called: N/A     What was the call regarding:  PATIENT WANTS TO KNOW IF SHE NEEDS TO SCHEDULE AN APPOINTMENT OR SCHEDULE AN ORTHO CONSULT .    Do you require a callback: YES

## 2022-01-20 NOTE — TELEPHONE ENCOUNTER
Patient notified and expressed understanding.  Patient said that she would not be able to wait that long because her knee is really painful.  Appointment scheduled for tomorrow.

## 2022-01-21 ENCOUNTER — OFFICE VISIT (OUTPATIENT)
Dept: INTERNAL MEDICINE | Facility: CLINIC | Age: 57
End: 2022-01-21

## 2022-01-21 VITALS
WEIGHT: 210.9 LBS | DIASTOLIC BLOOD PRESSURE: 84 MMHG | OXYGEN SATURATION: 99 % | HEART RATE: 77 BPM | TEMPERATURE: 97.9 F | BODY MASS INDEX: 30.19 KG/M2 | SYSTOLIC BLOOD PRESSURE: 138 MMHG | HEIGHT: 70 IN

## 2022-01-21 DIAGNOSIS — M25.561 ACUTE PAIN OF RIGHT KNEE: ICD-10-CM

## 2022-01-21 DIAGNOSIS — M25.361 KNEE INSTABILITY, RIGHT: Primary | ICD-10-CM

## 2022-01-21 PROCEDURE — 99213 OFFICE O/P EST LOW 20 MIN: CPT | Performed by: FAMILY MEDICINE

## 2022-02-19 ENCOUNTER — HOSPITAL ENCOUNTER (OUTPATIENT)
Dept: MRI IMAGING | Facility: HOSPITAL | Age: 57
Discharge: HOME | End: 2022-02-19
Admitting: FAMILY MEDICINE

## 2022-02-19 DIAGNOSIS — M25.361 KNEE INSTABILITY, RIGHT: ICD-10-CM

## 2022-02-19 DIAGNOSIS — M25.561 ACUTE PAIN OF RIGHT KNEE: ICD-10-CM

## 2022-02-19 PROCEDURE — 73721 MRI JNT OF LWR EXTRE W/O DYE: CPT

## 2022-02-22 DIAGNOSIS — M25.361 KNEE INSTABILITY, RIGHT: Primary | ICD-10-CM

## 2022-04-29 ENCOUNTER — PRE-ADMISSION TESTING (OUTPATIENT)
Dept: PREADMISSION TESTING | Facility: HOSPITAL | Age: 57
End: 2022-04-29

## 2022-04-29 ENCOUNTER — HOSPITAL ENCOUNTER (OUTPATIENT)
Dept: GENERAL RADIOLOGY | Facility: HOSPITAL | Age: 57
Discharge: HOME | End: 2022-04-29

## 2022-04-29 VITALS
BODY MASS INDEX: 32.07 KG/M2 | DIASTOLIC BLOOD PRESSURE: 93 MMHG | TEMPERATURE: 98.4 F | OXYGEN SATURATION: 98 % | HEIGHT: 70 IN | RESPIRATION RATE: 18 BRPM | WEIGHT: 224 LBS | SYSTOLIC BLOOD PRESSURE: 165 MMHG | HEART RATE: 76 BPM

## 2022-04-29 LAB
ALBUMIN SERPL-MCNC: 4.4 G/DL (ref 3.5–5.2)
ALBUMIN/GLOB SERPL: 1.8 G/DL
ALP SERPL-CCNC: 74 U/L (ref 39–117)
ALT SERPL W P-5'-P-CCNC: 32 U/L (ref 1–33)
ANION GAP SERPL CALCULATED.3IONS-SCNC: 11.3 MMOL/L (ref 5–15)
APTT PPP: 25.4 SECONDS (ref 22.7–35.4)
AST SERPL-CCNC: 26 U/L (ref 1–32)
BACTERIA UR QL AUTO: ABNORMAL /HPF
BASOPHILS # BLD AUTO: 0.07 10*3/MM3 (ref 0–0.2)
BASOPHILS NFR BLD AUTO: 1.5 % (ref 0–1.5)
BILIRUB SERPL-MCNC: 0.3 MG/DL (ref 0–1.2)
BILIRUB UR QL STRIP: NEGATIVE
BUN SERPL-MCNC: 16 MG/DL (ref 6–20)
BUN/CREAT SERPL: 17.4 (ref 7–25)
CALCIUM SPEC-SCNC: 9.3 MG/DL (ref 8.6–10.5)
CHLORIDE SERPL-SCNC: 105 MMOL/L (ref 98–107)
CLARITY UR: CLEAR
CO2 SERPL-SCNC: 23.7 MMOL/L (ref 22–29)
COLOR UR: YELLOW
CREAT SERPL-MCNC: 0.92 MG/DL (ref 0.57–1)
DEPRECATED RDW RBC AUTO: 40 FL (ref 37–54)
EGFRCR SERPLBLD CKD-EPI 2021: 73.7 ML/MIN/1.73
EOSINOPHIL # BLD AUTO: 0.13 10*3/MM3 (ref 0–0.4)
EOSINOPHIL NFR BLD AUTO: 2.8 % (ref 0.3–6.2)
ERYTHROCYTE [DISTWIDTH] IN BLOOD BY AUTOMATED COUNT: 13 % (ref 12.3–15.4)
GLOBULIN UR ELPH-MCNC: 2.5 GM/DL
GLUCOSE SERPL-MCNC: 95 MG/DL (ref 65–99)
GLUCOSE UR STRIP-MCNC: NEGATIVE MG/DL
HBA1C MFR BLD: 5.6 % (ref 4.8–5.6)
HCT VFR BLD AUTO: 39.6 % (ref 34–46.6)
HGB BLD-MCNC: 13.5 G/DL (ref 12–15.9)
HGB UR QL STRIP.AUTO: NEGATIVE
HYALINE CASTS UR QL AUTO: ABNORMAL /LPF
IMM GRANULOCYTES # BLD AUTO: 0.02 10*3/MM3 (ref 0–0.05)
IMM GRANULOCYTES NFR BLD AUTO: 0.4 % (ref 0–0.5)
INR PPP: 0.91 (ref 0.9–1.1)
KETONES UR QL STRIP: NEGATIVE
LEUKOCYTE ESTERASE UR QL STRIP.AUTO: ABNORMAL
LYMPHOCYTES # BLD AUTO: 1.69 10*3/MM3 (ref 0.7–3.1)
LYMPHOCYTES NFR BLD AUTO: 36.6 % (ref 19.6–45.3)
MCH RBC QN AUTO: 29.2 PG (ref 26.6–33)
MCHC RBC AUTO-ENTMCNC: 34.1 G/DL (ref 31.5–35.7)
MCV RBC AUTO: 85.5 FL (ref 79–97)
MONOCYTES # BLD AUTO: 0.47 10*3/MM3 (ref 0.1–0.9)
MONOCYTES NFR BLD AUTO: 10.2 % (ref 5–12)
NEUTROPHILS NFR BLD AUTO: 2.24 10*3/MM3 (ref 1.7–7)
NEUTROPHILS NFR BLD AUTO: 48.5 % (ref 42.7–76)
NITRITE UR QL STRIP: NEGATIVE
NRBC BLD AUTO-RTO: 0 /100 WBC (ref 0–0.2)
PH UR STRIP.AUTO: 5.5 [PH] (ref 5–8)
PLATELET # BLD AUTO: 235 10*3/MM3 (ref 140–450)
PMV BLD AUTO: 9 FL (ref 6–12)
POTASSIUM SERPL-SCNC: 4.3 MMOL/L (ref 3.5–5.2)
PROT SERPL-MCNC: 6.9 G/DL (ref 6–8.5)
PROT UR QL STRIP: NEGATIVE
PROTHROMBIN TIME: 12.2 SECONDS (ref 11.7–14.2)
QT INTERVAL: 380 MS
RBC # BLD AUTO: 4.63 10*6/MM3 (ref 3.77–5.28)
RBC # UR STRIP: ABNORMAL /HPF
REF LAB TEST METHOD: ABNORMAL
SODIUM SERPL-SCNC: 140 MMOL/L (ref 136–145)
SP GR UR STRIP: 1.02 (ref 1–1.03)
SQUAMOUS #/AREA URNS HPF: ABNORMAL /HPF
UROBILINOGEN UR QL STRIP: ABNORMAL
WBC # UR STRIP: ABNORMAL /HPF
WBC NRBC COR # BLD: 4.62 10*3/MM3 (ref 3.4–10.8)

## 2022-04-29 PROCEDURE — 85610 PROTHROMBIN TIME: CPT

## 2022-04-29 PROCEDURE — 81001 URINALYSIS AUTO W/SCOPE: CPT

## 2022-04-29 PROCEDURE — 73560 X-RAY EXAM OF KNEE 1 OR 2: CPT

## 2022-04-29 PROCEDURE — 93010 ELECTROCARDIOGRAM REPORT: CPT | Performed by: INTERNAL MEDICINE

## 2022-04-29 PROCEDURE — 85730 THROMBOPLASTIN TIME PARTIAL: CPT

## 2022-04-29 PROCEDURE — 36415 COLL VENOUS BLD VENIPUNCTURE: CPT

## 2022-04-29 PROCEDURE — 85025 COMPLETE CBC W/AUTO DIFF WBC: CPT

## 2022-04-29 PROCEDURE — 93005 ELECTROCARDIOGRAM TRACING: CPT

## 2022-04-29 PROCEDURE — 83036 HEMOGLOBIN GLYCOSYLATED A1C: CPT

## 2022-04-29 PROCEDURE — 80053 COMPREHEN METABOLIC PANEL: CPT

## 2022-04-29 PROCEDURE — 71046 X-RAY EXAM CHEST 2 VIEWS: CPT

## 2022-04-29 RX ORDER — CHLORHEXIDINE GLUCONATE 500 MG/1
CLOTH TOPICAL
COMMUNITY
End: 2022-07-19

## 2022-04-29 ASSESSMENT — KOOS JR
KOOS JR SCORE: 36.931
KOOS JR SCORE: 20

## 2022-05-03 ENCOUNTER — TRANSCRIBE ORDERS (OUTPATIENT)
Dept: PHYSICAL THERAPY | Facility: CLINIC | Age: 57
End: 2022-05-03

## 2022-05-03 DIAGNOSIS — Z96.651 STATUS POST TOTAL RIGHT KNEE REPLACEMENT: Primary | ICD-10-CM

## 2022-05-05 ENCOUNTER — TELEPHONE (OUTPATIENT)
Dept: ORTHOPEDIC SURGERY | Facility: HOSPITAL | Age: 57
End: 2022-05-05

## 2022-05-05 NOTE — TELEPHONE ENCOUNTER
Risk Factor yes no   Age >75  X   BMI <20 >40  X   Patient History     Chronic Pain (2 or more meds/Pain Management)  X   ETOH (more than 3 drinks Daily)  X   Uncontrolled Depression/Bipolar/Schizoaffective Disorder  X   Arrhythmias  X   Stent placement/MI  X   DVT/PE  X   Pacemaker  X   HTN (uncontrolled or requiring more than 2 medications)  X   CHF/Retained fluids/Edema  X   Stroke with Residual   X   COPD/Asthma X    PAULIE--Non-compliant with CPAP  X   Diabetes (on insulin or more than 2 meds)         A1C:5.6  X   BPH/Urinary retention (on medication)  X   CKD  X   Home environment and support     Current ambulation status (use of cane, walker, W/C, Multiple falls/weakness)  X   Stairs to enter and throughout home X    Lives Alone  X   Doesn’t have support at home  X     Outpatient Screening Assessment    Home needs: (Walker/BSC):  Has walker  ? Steps  2 steps;   Caregiver 24-48hrs post-discharge: Home with      Discharge Plan:  --[facility]     Prescriptions: Meds to bed    Home medications:   ? Blood thinner/anti-coag therapy--ASA  ? BPH or diuretic  ? BP meds--   ? Pain/Anti-inflammatories--  Pre-op Education:  Educate patient on spinal anesthesia/pain control:  ? patient verbalize understanding    Educate patient on hospital course/timeline:  ?  patient verbalize understanding    Joint Care Class:  ?  yes ? no      Notes:

## 2022-05-06 ENCOUNTER — LAB (OUTPATIENT)
Dept: LAB | Facility: HOSPITAL | Age: 57
End: 2022-05-06

## 2022-05-06 PROCEDURE — C9803 HOPD COVID-19 SPEC COLLECT: HCPCS

## 2022-05-06 PROCEDURE — U0004 COV-19 TEST NON-CDC HGH THRU: HCPCS

## 2022-05-07 LAB — SARS-COV-2 ORF1AB RESP QL NAA+PROBE: NOT DETECTED

## 2022-05-09 ENCOUNTER — APPOINTMENT (OUTPATIENT)
Dept: GENERAL RADIOLOGY | Facility: HOSPITAL | Age: 57
End: 2022-05-09

## 2022-05-09 ENCOUNTER — HOSPITAL ENCOUNTER (OUTPATIENT)
Facility: HOSPITAL | Age: 57
Discharge: HOME | End: 2022-05-09
Attending: ORTHOPAEDIC SURGERY | Admitting: ORTHOPAEDIC SURGERY

## 2022-05-09 ENCOUNTER — ANESTHESIA (OUTPATIENT)
Dept: PERIOP | Facility: HOSPITAL | Age: 57
End: 2022-05-09

## 2022-05-09 ENCOUNTER — ANESTHESIA EVENT (OUTPATIENT)
Dept: PERIOP | Facility: HOSPITAL | Age: 57
End: 2022-05-09

## 2022-05-09 ENCOUNTER — HOME HEALTH ADMISSION (OUTPATIENT)
Dept: HOME HEALTH SERVICES | Facility: HOME HEALTHCARE | Age: 57
End: 2022-05-09

## 2022-05-09 VITALS
TEMPERATURE: 97.9 F | HEART RATE: 55 BPM | RESPIRATION RATE: 16 BRPM | BODY MASS INDEX: 32.2 KG/M2 | DIASTOLIC BLOOD PRESSURE: 66 MMHG | WEIGHT: 224.43 LBS | SYSTOLIC BLOOD PRESSURE: 142 MMHG | OXYGEN SATURATION: 95 %

## 2022-05-09 DIAGNOSIS — M25.361 KNEE INSTABILITY, RIGHT: Primary | ICD-10-CM

## 2022-05-09 PROBLEM — Z96.659 KNEE JOINT REPLACEMENT STATUS: Status: ACTIVE | Noted: 2022-05-09

## 2022-05-09 PROCEDURE — 25010000002 ONDANSETRON PER 1 MG

## 2022-05-09 PROCEDURE — C1713 ANCHOR/SCREW BN/BN,TIS/BN: HCPCS | Performed by: ORTHOPAEDIC SURGERY

## 2022-05-09 PROCEDURE — 97161 PT EVAL LOW COMPLEX 20 MIN: CPT

## 2022-05-09 PROCEDURE — 25010000002 KETOROLAC TROMETHAMINE PER 15 MG: Performed by: ORTHOPAEDIC SURGERY

## 2022-05-09 PROCEDURE — 25010000002 HYDROMORPHONE 1 MG/ML SOLUTION

## 2022-05-09 PROCEDURE — 25010000002 HYDROMORPHONE PER 4 MG

## 2022-05-09 PROCEDURE — 25010000002 CLONIDINE PER 1 MG: Performed by: ORTHOPAEDIC SURGERY

## 2022-05-09 PROCEDURE — G0378 HOSPITAL OBSERVATION PER HR: HCPCS

## 2022-05-09 PROCEDURE — 25010000002 DEXAMETHASONE PER 1 MG

## 2022-05-09 PROCEDURE — 25010000002 ROPIVACAINE PER 1 MG: Performed by: ORTHOPAEDIC SURGERY

## 2022-05-09 PROCEDURE — C1776 JOINT DEVICE (IMPLANTABLE): HCPCS | Performed by: ORTHOPAEDIC SURGERY

## 2022-05-09 PROCEDURE — 25010000002 PROPOFOL 10 MG/ML EMULSION

## 2022-05-09 PROCEDURE — 97110 THERAPEUTIC EXERCISES: CPT

## 2022-05-09 PROCEDURE — 25010000002 NEOSTIGMINE 5 MG/10ML SOLUTION

## 2022-05-09 PROCEDURE — 25010000002 FENTANYL CITRATE (PF) 50 MCG/ML SOLUTION

## 2022-05-09 PROCEDURE — 25010000002 CEFAZOLIN IN DEXTROSE 2-4 GM/100ML-% SOLUTION: Performed by: ORTHOPAEDIC SURGERY

## 2022-05-09 PROCEDURE — 25010000002 EPINEPHRINE 1 MG/ML SOLUTION 30 ML VIAL: Performed by: ORTHOPAEDIC SURGERY

## 2022-05-09 PROCEDURE — 73560 X-RAY EXAM OF KNEE 1 OR 2: CPT

## 2022-05-09 DEVICE — KNOTLESS TISSUE CONTROL DEVICE, VIOLET UNIDIRECTIONAL (ANTIBACTERIAL) SYNTHETIC ABSORBABLE DEVICE
Type: IMPLANTABLE DEVICE | Site: KNEE | Status: FUNCTIONAL
Brand: STRATAFIX

## 2022-05-09 DEVICE — JOURNEY 7.5 ROUND RESURF PAT 32MM STANDARD
Type: IMPLANTABLE DEVICE | Site: KNEE | Status: FUNCTIONAL
Brand: JOURNEY

## 2022-05-09 DEVICE — VIOLET ANTIBACTERIAL POLYDIOXANONE, KNOTLESS TISSUE CONTROL DEVICE
Type: IMPLANTABLE DEVICE | Site: KNEE | Status: FUNCTIONAL
Brand: STRATAFIX

## 2022-05-09 DEVICE — JOURNEY II BCS XLPE ARTICULAR                                    INSERT SIZE 5-6 RIGHT 11MM
Type: IMPLANTABLE DEVICE | Site: KNEE | Status: FUNCTIONAL
Brand: JOURNEY

## 2022-05-09 DEVICE — PALACOS® R IS A FAST-CURING, RADIOPAQUE, POLY(METHYL METHACRYLATE)-BASED BONE CEMENT.PALACOS ® R CONTAINS THE X-RAY CONTRAST MEDIUM ZIRCONIUM DIOXIDE. TO IMPROVE VISIBILITY IN THE SURGICAL FIELD PALACOS ® R HAS BEEN COLOURED WITH CHLOROPHYLL (E141). THE BONE CEMENT IS PREPARED DIRECTLY BEFORE USE BY MIXING A POLYMER POWDER COMPONENT WITH A LIQUID MONOMER COMPONENT. A DUCTILE DOUGH FORMS WHICH CURES WITHIN A FEW MINUTES.
Type: IMPLANTABLE DEVICE | Site: KNEE | Status: FUNCTIONAL
Brand: PALACOS®

## 2022-05-09 DEVICE — JOURNEY II BCS FEMORAL OXINIUM                                    RIGHT SIZE 7
Type: IMPLANTABLE DEVICE | Site: KNEE | Status: FUNCTIONAL
Brand: JOURNEY

## 2022-05-09 DEVICE — IMPLANTABLE DEVICE: Type: IMPLANTABLE DEVICE | Site: KNEE | Status: FUNCTIONAL

## 2022-05-09 DEVICE — JOURNEY TIBIAL BASEPLATE NONPOROUS                                    RIGHT SIZE 5
Type: IMPLANTABLE DEVICE | Site: KNEE | Status: FUNCTIONAL
Brand: JOURNEY

## 2022-05-09 RX ORDER — HYDROMORPHONE HYDROCHLORIDE 1 MG/ML
0.5 INJECTION, SOLUTION INTRAMUSCULAR; INTRAVENOUS; SUBCUTANEOUS
Status: DISCONTINUED | OUTPATIENT
Start: 2022-05-09 | End: 2022-05-09 | Stop reason: HOSPADM

## 2022-05-09 RX ORDER — ONDANSETRON 2 MG/ML
4 INJECTION INTRAMUSCULAR; INTRAVENOUS ONCE AS NEEDED
Status: DISCONTINUED | OUTPATIENT
Start: 2022-05-09 | End: 2022-05-09 | Stop reason: HOSPADM

## 2022-05-09 RX ORDER — MIDAZOLAM HYDROCHLORIDE 1 MG/ML
1 INJECTION INTRAMUSCULAR; INTRAVENOUS
Status: DISCONTINUED | OUTPATIENT
Start: 2022-05-09 | End: 2022-05-09 | Stop reason: HOSPADM

## 2022-05-09 RX ORDER — ONDANSETRON 2 MG/ML
4 INJECTION INTRAMUSCULAR; INTRAVENOUS EVERY 6 HOURS PRN
Status: DISCONTINUED | OUTPATIENT
Start: 2022-05-09 | End: 2022-05-09 | Stop reason: HOSPADM

## 2022-05-09 RX ORDER — TRANEXAMIC ACID 100 MG/ML
INJECTION, SOLUTION INTRAVENOUS AS NEEDED
Status: DISCONTINUED | OUTPATIENT
Start: 2022-05-09 | End: 2022-05-09 | Stop reason: SURG

## 2022-05-09 RX ORDER — NEOSTIGMINE METHYLSULFATE 0.5 MG/ML
INJECTION, SOLUTION INTRAVENOUS AS NEEDED
Status: DISCONTINUED | OUTPATIENT
Start: 2022-05-09 | End: 2022-05-09 | Stop reason: SURG

## 2022-05-09 RX ORDER — NALOXONE HCL 0.4 MG/ML
0.1 VIAL (ML) INJECTION
Status: DISCONTINUED | OUTPATIENT
Start: 2022-05-09 | End: 2022-05-09 | Stop reason: HOSPADM

## 2022-05-09 RX ORDER — PROMETHAZINE HYDROCHLORIDE 25 MG/1
25 TABLET ORAL ONCE AS NEEDED
Status: DISCONTINUED | OUTPATIENT
Start: 2022-05-09 | End: 2022-05-09 | Stop reason: HOSPADM

## 2022-05-09 RX ORDER — LABETALOL HYDROCHLORIDE 5 MG/ML
5 INJECTION, SOLUTION INTRAVENOUS
Status: DISCONTINUED | OUTPATIENT
Start: 2022-05-09 | End: 2022-05-09 | Stop reason: HOSPADM

## 2022-05-09 RX ORDER — ROCURONIUM BROMIDE 10 MG/ML
INJECTION, SOLUTION INTRAVENOUS AS NEEDED
Status: DISCONTINUED | OUTPATIENT
Start: 2022-05-09 | End: 2022-05-09 | Stop reason: SURG

## 2022-05-09 RX ORDER — EPHEDRINE SULFATE 50 MG/ML
5 INJECTION, SOLUTION INTRAVENOUS ONCE AS NEEDED
Status: DISCONTINUED | OUTPATIENT
Start: 2022-05-09 | End: 2022-05-09 | Stop reason: HOSPADM

## 2022-05-09 RX ORDER — MELOXICAM 7.5 MG/1
15 TABLET ORAL DAILY
Qty: 28 TABLET | Refills: 0 | Status: SHIPPED | OUTPATIENT
Start: 2022-05-09 | End: 2022-05-23

## 2022-05-09 RX ORDER — ONDANSETRON 2 MG/ML
4 INJECTION INTRAMUSCULAR; INTRAVENOUS ONCE AS NEEDED
Status: COMPLETED | OUTPATIENT
Start: 2022-05-09 | End: 2022-05-09

## 2022-05-09 RX ORDER — PROMETHAZINE HYDROCHLORIDE 12.5 MG/1
12.5 TABLET ORAL EVERY 4 HOURS PRN
Status: DISCONTINUED | OUTPATIENT
Start: 2022-05-09 | End: 2022-05-09 | Stop reason: HOSPADM

## 2022-05-09 RX ORDER — SODIUM CHLORIDE 9 MG/ML
100 INJECTION, SOLUTION INTRAVENOUS CONTINUOUS
Status: DISCONTINUED | OUTPATIENT
Start: 2022-05-09 | End: 2022-05-09 | Stop reason: HOSPADM

## 2022-05-09 RX ORDER — UREA 10 %
1 LOTION (ML) TOPICAL NIGHTLY PRN
Status: DISCONTINUED | OUTPATIENT
Start: 2022-05-09 | End: 2022-05-09 | Stop reason: HOSPADM

## 2022-05-09 RX ORDER — LIDOCAINE HYDROCHLORIDE 20 MG/ML
INJECTION, SOLUTION INFILTRATION; PERINEURAL AS NEEDED
Status: DISCONTINUED | OUTPATIENT
Start: 2022-05-09 | End: 2022-05-09 | Stop reason: SURG

## 2022-05-09 RX ORDER — FENTANYL CITRATE 50 UG/ML
INJECTION, SOLUTION INTRAMUSCULAR; INTRAVENOUS AS NEEDED
Status: DISCONTINUED | OUTPATIENT
Start: 2022-05-09 | End: 2022-05-09 | Stop reason: SURG

## 2022-05-09 RX ORDER — CELECOXIB 200 MG/1
200 CAPSULE ORAL ONCE
Status: COMPLETED | OUTPATIENT
Start: 2022-05-09 | End: 2022-05-09

## 2022-05-09 RX ORDER — OXYCODONE HYDROCHLORIDE AND ACETAMINOPHEN 5; 325 MG/1; MG/1
1 TABLET ORAL EVERY 4 HOURS PRN
Status: DISCONTINUED | OUTPATIENT
Start: 2022-05-09 | End: 2022-05-09 | Stop reason: HOSPADM

## 2022-05-09 RX ORDER — DIPHENHYDRAMINE HCL 25 MG
50 CAPSULE ORAL EVERY 6 HOURS PRN
Status: DISCONTINUED | OUTPATIENT
Start: 2022-05-09 | End: 2022-05-09 | Stop reason: HOSPADM

## 2022-05-09 RX ORDER — FAMOTIDINE 20 MG/1
40 TABLET, FILM COATED ORAL DAILY
Status: DISCONTINUED | OUTPATIENT
Start: 2022-05-09 | End: 2022-05-09 | Stop reason: HOSPADM

## 2022-05-09 RX ORDER — CEFAZOLIN SODIUM 2 G/100ML
2 INJECTION, SOLUTION INTRAVENOUS ONCE
Status: COMPLETED | OUTPATIENT
Start: 2022-05-09 | End: 2022-05-09

## 2022-05-09 RX ORDER — FAMOTIDINE 10 MG/ML
20 INJECTION, SOLUTION INTRAVENOUS ONCE
Status: COMPLETED | OUTPATIENT
Start: 2022-05-09 | End: 2022-05-09

## 2022-05-09 RX ORDER — FLUMAZENIL 0.1 MG/ML
0.2 INJECTION INTRAVENOUS AS NEEDED
Status: DISCONTINUED | OUTPATIENT
Start: 2022-05-09 | End: 2022-05-09 | Stop reason: HOSPADM

## 2022-05-09 RX ORDER — HYDROCODONE BITARTRATE AND ACETAMINOPHEN 7.5; 325 MG/1; MG/1
1 TABLET ORAL ONCE AS NEEDED
Status: DISCONTINUED | OUTPATIENT
Start: 2022-05-09 | End: 2022-05-09 | Stop reason: HOSPADM

## 2022-05-09 RX ORDER — MELOXICAM 15 MG/1
15 TABLET ORAL DAILY
Status: DISCONTINUED | OUTPATIENT
Start: 2022-05-09 | End: 2022-05-09 | Stop reason: HOSPADM

## 2022-05-09 RX ORDER — PROPOFOL 10 MG/ML
VIAL (ML) INTRAVENOUS AS NEEDED
Status: DISCONTINUED | OUTPATIENT
Start: 2022-05-09 | End: 2022-05-09 | Stop reason: SURG

## 2022-05-09 RX ORDER — DIPHENHYDRAMINE HYDROCHLORIDE 50 MG/ML
25 INJECTION INTRAMUSCULAR; INTRAVENOUS EVERY 6 HOURS PRN
Status: DISCONTINUED | OUTPATIENT
Start: 2022-05-09 | End: 2022-05-09 | Stop reason: HOSPADM

## 2022-05-09 RX ORDER — PROMETHAZINE HYDROCHLORIDE 25 MG/1
25 SUPPOSITORY RECTAL ONCE AS NEEDED
Status: DISCONTINUED | OUTPATIENT
Start: 2022-05-09 | End: 2022-05-09 | Stop reason: HOSPADM

## 2022-05-09 RX ORDER — DOCUSATE SODIUM 100 MG/1
100 CAPSULE, LIQUID FILLED ORAL 2 TIMES DAILY PRN
Status: DISCONTINUED | OUTPATIENT
Start: 2022-05-09 | End: 2022-05-09 | Stop reason: HOSPADM

## 2022-05-09 RX ORDER — OXYCODONE HYDROCHLORIDE AND ACETAMINOPHEN 5; 325 MG/1; MG/1
2 TABLET ORAL EVERY 4 HOURS PRN
Status: DISCONTINUED | OUTPATIENT
Start: 2022-05-09 | End: 2022-05-09 | Stop reason: HOSPADM

## 2022-05-09 RX ORDER — ASPIRIN 81 MG/1
81 TABLET ORAL 2 TIMES DAILY
Status: DISCONTINUED | OUTPATIENT
Start: 2022-05-10 | End: 2022-05-09 | Stop reason: HOSPADM

## 2022-05-09 RX ORDER — FENTANYL CITRATE 50 UG/ML
50 INJECTION, SOLUTION INTRAMUSCULAR; INTRAVENOUS
Status: DISCONTINUED | OUTPATIENT
Start: 2022-05-09 | End: 2022-05-09 | Stop reason: HOSPADM

## 2022-05-09 RX ORDER — DIPHENHYDRAMINE HYDROCHLORIDE 50 MG/ML
12.5 INJECTION INTRAMUSCULAR; INTRAVENOUS
Status: DISCONTINUED | OUTPATIENT
Start: 2022-05-09 | End: 2022-05-09 | Stop reason: HOSPADM

## 2022-05-09 RX ORDER — OXYCODONE HYDROCHLORIDE AND ACETAMINOPHEN 5; 325 MG/1; MG/1
1 TABLET ORAL EVERY 4 HOURS PRN
Qty: 50 TABLET | Refills: 0 | Status: SHIPPED | OUTPATIENT
Start: 2022-05-09 | End: 2022-07-19

## 2022-05-09 RX ORDER — OXYCODONE HYDROCHLORIDE AND ACETAMINOPHEN 5; 325 MG/1; MG/1
1 TABLET ORAL ONCE AS NEEDED
Status: DISCONTINUED | OUTPATIENT
Start: 2022-05-09 | End: 2022-05-09 | Stop reason: SDUPTHER

## 2022-05-09 RX ORDER — CEFAZOLIN SODIUM 2 G/100ML
2 INJECTION, SOLUTION INTRAVENOUS EVERY 8 HOURS
Status: DISCONTINUED | OUTPATIENT
Start: 2022-05-09 | End: 2022-05-09 | Stop reason: HOSPADM

## 2022-05-09 RX ORDER — HYDRALAZINE HYDROCHLORIDE 20 MG/ML
5 INJECTION INTRAMUSCULAR; INTRAVENOUS
Status: DISCONTINUED | OUTPATIENT
Start: 2022-05-09 | End: 2022-05-09 | Stop reason: HOSPADM

## 2022-05-09 RX ORDER — ALBUTEROL SULFATE 90 UG/1
2 AEROSOL, METERED RESPIRATORY (INHALATION) EVERY 6 HOURS PRN
Status: DISCONTINUED | OUTPATIENT
Start: 2022-05-09 | End: 2022-05-09 | Stop reason: HOSPADM

## 2022-05-09 RX ORDER — DEXAMETHASONE SODIUM PHOSPHATE 10 MG/ML
INJECTION INTRAMUSCULAR; INTRAVENOUS AS NEEDED
Status: DISCONTINUED | OUTPATIENT
Start: 2022-05-09 | End: 2022-05-09 | Stop reason: SURG

## 2022-05-09 RX ORDER — SODIUM CHLORIDE, SODIUM LACTATE, POTASSIUM CHLORIDE, CALCIUM CHLORIDE 600; 310; 30; 20 MG/100ML; MG/100ML; MG/100ML; MG/100ML
9 INJECTION, SOLUTION INTRAVENOUS CONTINUOUS
Status: DISCONTINUED | OUTPATIENT
Start: 2022-05-09 | End: 2022-05-09 | Stop reason: HOSPADM

## 2022-05-09 RX ORDER — SODIUM CHLORIDE 0.9 % (FLUSH) 0.9 %
10 SYRINGE (ML) INJECTION EVERY 12 HOURS SCHEDULED
Status: DISCONTINUED | OUTPATIENT
Start: 2022-05-09 | End: 2022-05-09 | Stop reason: HOSPADM

## 2022-05-09 RX ORDER — ONDANSETRON 2 MG/ML
INJECTION INTRAMUSCULAR; INTRAVENOUS AS NEEDED
Status: DISCONTINUED | OUTPATIENT
Start: 2022-05-09 | End: 2022-05-09 | Stop reason: SURG

## 2022-05-09 RX ORDER — MAGNESIUM HYDROXIDE 1200 MG/15ML
LIQUID ORAL AS NEEDED
Status: DISCONTINUED | OUTPATIENT
Start: 2022-05-09 | End: 2022-05-09 | Stop reason: HOSPADM

## 2022-05-09 RX ORDER — NALOXONE HCL 0.4 MG/ML
0.2 VIAL (ML) INJECTION AS NEEDED
Status: DISCONTINUED | OUTPATIENT
Start: 2022-05-09 | End: 2022-05-09 | Stop reason: HOSPADM

## 2022-05-09 RX ORDER — ACETAMINOPHEN 325 MG/1
650 TABLET ORAL EVERY 6 HOURS PRN
Status: DISCONTINUED | OUTPATIENT
Start: 2022-05-09 | End: 2022-05-09 | Stop reason: HOSPADM

## 2022-05-09 RX ORDER — PANTOPRAZOLE SODIUM 40 MG/1
40 TABLET, DELAYED RELEASE ORAL DAILY
Qty: 14 TABLET | Refills: 0 | Status: SHIPPED | OUTPATIENT
Start: 2022-05-09 | End: 2022-05-23

## 2022-05-09 RX ORDER — ONDANSETRON 4 MG/1
4 TABLET, FILM COATED ORAL EVERY 6 HOURS PRN
Status: DISCONTINUED | OUTPATIENT
Start: 2022-05-09 | End: 2022-05-09 | Stop reason: SDUPTHER

## 2022-05-09 RX ORDER — DOCUSATE SODIUM 100 MG/1
100 CAPSULE, LIQUID FILLED ORAL 2 TIMES DAILY PRN
Qty: 30 CAPSULE | Refills: 0 | Status: SHIPPED | OUTPATIENT
Start: 2022-05-09 | End: 2022-07-19

## 2022-05-09 RX ORDER — SODIUM CHLORIDE 0.9 % (FLUSH) 0.9 %
10 SYRINGE (ML) INJECTION AS NEEDED
Status: DISCONTINUED | OUTPATIENT
Start: 2022-05-09 | End: 2022-05-09 | Stop reason: HOSPADM

## 2022-05-09 RX ORDER — DIPHENHYDRAMINE HCL 25 MG
25 CAPSULE ORAL
Status: DISCONTINUED | OUTPATIENT
Start: 2022-05-09 | End: 2022-05-09 | Stop reason: HOSPADM

## 2022-05-09 RX ORDER — ONDANSETRON 4 MG/1
4 TABLET, FILM COATED ORAL EVERY 6 HOURS PRN
Status: DISCONTINUED | OUTPATIENT
Start: 2022-05-09 | End: 2022-05-09 | Stop reason: HOSPADM

## 2022-05-09 RX ORDER — ASPIRIN 81 MG/1
81 TABLET ORAL DAILY
Status: DISCONTINUED | OUTPATIENT
Start: 2022-05-09 | End: 2022-05-09 | Stop reason: HOSPADM

## 2022-05-09 RX ORDER — OXYCODONE HYDROCHLORIDE 5 MG/1
5 TABLET ORAL ONCE
Status: COMPLETED | OUTPATIENT
Start: 2022-05-09 | End: 2022-05-09

## 2022-05-09 RX ORDER — OXYCODONE HYDROCHLORIDE AND ACETAMINOPHEN 5; 325 MG/1; MG/1
1 TABLET ORAL EVERY 4 HOURS PRN
Status: DISCONTINUED | OUTPATIENT
Start: 2022-05-09 | End: 2022-05-09 | Stop reason: SDUPTHER

## 2022-05-09 RX ORDER — GLYCOPYRROLATE 0.2 MG/ML
INJECTION INTRAMUSCULAR; INTRAVENOUS AS NEEDED
Status: DISCONTINUED | OUTPATIENT
Start: 2022-05-09 | End: 2022-05-09 | Stop reason: SURG

## 2022-05-09 RX ORDER — ONDANSETRON 4 MG/1
4 TABLET, FILM COATED ORAL EVERY 8 HOURS PRN
Qty: 10 TABLET | Refills: 0 | Status: SHIPPED | OUTPATIENT
Start: 2022-05-09 | End: 2022-07-19

## 2022-05-09 RX ORDER — OXYCODONE AND ACETAMINOPHEN 7.5; 325 MG/1; MG/1
1 TABLET ORAL EVERY 4 HOURS PRN
Status: DISCONTINUED | OUTPATIENT
Start: 2022-05-09 | End: 2022-05-09 | Stop reason: HOSPADM

## 2022-05-09 RX ORDER — ASPIRIN 81 MG/1
81 TABLET ORAL 2 TIMES DAILY
Qty: 60 TABLET | Refills: 0 | Status: SHIPPED | OUTPATIENT
Start: 2022-05-10 | End: 2022-06-09

## 2022-05-09 RX ADMIN — PROPOFOL 200 MG: 10 INJECTION, EMULSION INTRAVENOUS at 08:21

## 2022-05-09 RX ADMIN — SODIUM CHLORIDE, POTASSIUM CHLORIDE, SODIUM LACTATE AND CALCIUM CHLORIDE 9 ML/HR: 600; 310; 30; 20 INJECTION, SOLUTION INTRAVENOUS at 07:20

## 2022-05-09 RX ADMIN — TRANEXAMIC ACID 1000 MG: 1 INJECTION, SOLUTION INTRAVENOUS at 08:35

## 2022-05-09 RX ADMIN — FENTANYL CITRATE 50 MCG: 0.05 INJECTION, SOLUTION INTRAMUSCULAR; INTRAVENOUS at 08:21

## 2022-05-09 RX ADMIN — HYDROMORPHONE HYDROCHLORIDE 0.5 MG: 1 INJECTION, SOLUTION INTRAMUSCULAR; INTRAVENOUS; SUBCUTANEOUS at 10:20

## 2022-05-09 RX ADMIN — ONDANSETRON 4 MG: 2 INJECTION INTRAMUSCULAR; INTRAVENOUS at 09:34

## 2022-05-09 RX ADMIN — ONDANSETRON 4 MG: 2 INJECTION INTRAMUSCULAR; INTRAVENOUS at 10:59

## 2022-05-09 RX ADMIN — DEXAMETHASONE SODIUM PHOSPHATE 8 MG: 10 INJECTION INTRAMUSCULAR; INTRAVENOUS at 08:25

## 2022-05-09 RX ADMIN — FENTANYL CITRATE 50 MCG: 0.05 INJECTION, SOLUTION INTRAMUSCULAR; INTRAVENOUS at 10:14

## 2022-05-09 RX ADMIN — HYDROMORPHONE HYDROCHLORIDE 0.5 MG: 1 INJECTION, SOLUTION INTRAMUSCULAR; INTRAVENOUS; SUBCUTANEOUS at 08:50

## 2022-05-09 RX ADMIN — NEOSTIGMINE METHYLSULFATE 4 MG: 0.5 INJECTION INTRAVENOUS at 09:49

## 2022-05-09 RX ADMIN — GLYCOPYRROLATE 0.4 MG: 0.2 INJECTION INTRAMUSCULAR; INTRAVENOUS at 09:49

## 2022-05-09 RX ADMIN — CEFAZOLIN SODIUM 2 G: 2 INJECTION, SOLUTION INTRAVENOUS at 08:10

## 2022-05-09 RX ADMIN — GLYCOPYRROLATE 0.1 MG: 0.2 INJECTION INTRAMUSCULAR; INTRAVENOUS at 08:25

## 2022-05-09 RX ADMIN — LIDOCAINE HYDROCHLORIDE 100 MG: 20 INJECTION, SOLUTION INFILTRATION; PERINEURAL at 08:21

## 2022-05-09 RX ADMIN — ROCURONIUM BROMIDE 40 MG: 50 INJECTION INTRAVENOUS at 08:21

## 2022-05-09 RX ADMIN — FAMOTIDINE 20 MG: 10 INJECTION INTRAVENOUS at 07:20

## 2022-05-09 RX ADMIN — SODIUM CHLORIDE, POTASSIUM CHLORIDE, SODIUM LACTATE AND CALCIUM CHLORIDE: 600; 310; 30; 20 INJECTION, SOLUTION INTRAVENOUS at 09:34

## 2022-05-09 RX ADMIN — FENTANYL CITRATE 50 MCG: 0.05 INJECTION, SOLUTION INTRAMUSCULAR; INTRAVENOUS at 10:25

## 2022-05-09 RX ADMIN — FENTANYL CITRATE 50 MCG: 0.05 INJECTION, SOLUTION INTRAMUSCULAR; INTRAVENOUS at 08:40

## 2022-05-09 RX ADMIN — CELECOXIB 200 MG: 200 CAPSULE ORAL at 07:20

## 2022-05-09 RX ADMIN — OXYCODONE HYDROCHLORIDE AND ACETAMINOPHEN 1 TABLET: 7.5; 325 TABLET ORAL at 10:58

## 2022-05-09 RX ADMIN — OXYCODONE HYDROCHLORIDE 5 MG: 5 TABLET ORAL at 07:20

## 2022-05-09 RX ADMIN — HYDROMORPHONE HYDROCHLORIDE 0.5 MG: 1 INJECTION, SOLUTION INTRAMUSCULAR; INTRAVENOUS; SUBCUTANEOUS at 10:30

## 2022-05-09 NOTE — THERAPY EVALUATION
Patient Name: Kalpana Hendrix  : 1965    MRN: [id number]                              Today's Date: 2022       Admit Date: 2022    Visit Dx:     ICD-10-CM ICD-9-CM   1. Knee instability, right  M25.361 718.86     Patient Active Problem List   Diagnosis   • Anxiety   • HLD (hyperlipidemia)   • Muscle ache   • Stenosis of subclavian artery (HCC)   • Avitaminosis D   • Arthritis   • Gastroesophageal reflux disease   • Lung nodule   • Colon cancer screening   • Rectal bleeding   • Chronic bilateral low back pain without sciatica   • Skin lesions   • Acute pain of right shoulder   • Tendinopathy of right rotator cuff   • Acute pain of right knee   • Knee instability, right   • Knee joint replacement status     Past Medical History:   Diagnosis Date   • Anxiety    • Asthma     MILD   • History of COVID-19 2021   • Hyperlipidemia    • Knee pain, right    • Nodule of right lung    • Right knee injury    • Subclavian artery stenosis (HCC)      Past Surgical History:   Procedure Laterality Date   • TUBAL ABDOMINAL LIGATION     • WISDOM TOOTH EXTRACTION        General Information     Row Name 22 1314          Physical Therapy Time and Intention    Document Type evaluation;discharge evaluation/summary  -EJ     Mode of Treatment physical therapy  -EJ     Row Name 22 1314          General Information    Patient Profile Reviewed yes  -EJ     Prior Level of Function independent:;ADL's;all household mobility;community mobility  -EJ     Existing Precautions/Restrictions no known precautions/restrictions  -EJ     Barriers to Rehab none identified  -EJ     Row Name 22 1314          Living Environment    People in Home spouse  -EJ     Row Name 22 1314          Home Main Entrance    Number of Stairs, Main Entrance two  -EJ     Stair Railings, Main Entrance railings safe and in good condition  -EJ     Row Name 22 1314          Cognition    Orientation Status (Cognition) oriented x 4  -EJ      Row Name 05/09/22 1314          Safety Issues, Functional Mobility    Impairments Affecting Function (Mobility) strength;range of motion (ROM);pain  -EJ           User Key  (r) = Recorded By, (t) = Taken By, (c) = Cosigned By    Initials Name Provider Type    EJ Ann Morales, PT Physical Therapist               Mobility     Row Name 05/09/22 1315          Bed Mobility    Bed Mobility supine-sit;sit-supine  -EJ     Supine-Sit [place] (Bed Mobility) standby assist  -EJ     Sit-Supine [place] (Bed Mobility) standby assist  -EJ     Assistive Device (Bed Mobility) head of bed elevated  -EJ     Row Name 05/09/22 1315          Sit-Stand Transfer    Sit-Stand [place] (Transfers) verbal cues;contact guard  -EJ     Assistive Device (Sit-Stand Transfers) walker, front-wheeled  -EJ     Row Name 05/09/22 1315          Gait/Stairs (Locomotion)    [place] Level (Gait) verbal cues;contact guard  -EJ     Assistive Device (Gait) walker, front-wheeled  -EJ     Distance in Feet (Gait) 80  -EJ     Deviations/Abnormal Patterns (Gait) baylee decreased;antalgic;stride length decreased  -EJ     Bilateral Gait Deviations forward flexed posture  -EJ     Comment, (Gait/Stairs) cues for sequence, verbally educated pt on stair training- she was able to perform single leg stand on RLE to adequately clear L foot to safely perform steps  -EJ           User Key  (r) = Recorded By, (t) = Taken By, (c) = Cosigned By    Initials Name Provider Type    Ann Shane, PT Physical Therapist               Obj/Interventions     Row Name 05/09/22 1316          Range of Motion Comprehensive    General Range of Motion no range of motion deficits identified  -EJ     Comment, General Range of Motion x R knee  -EJ     Row Name 05/09/22 1316          Strength Comprehensive (MMT)    Comment, General Manual Muscle Testing (MMT) Assessment post op weakness  -EJ     Row Name 05/09/22 1316          Motor Skills    Therapeutic  Exercise --  R TKR protocol x 10 reps  -EJ           User Key  (r) = Recorded By, (t) = Taken By, (c) = Cosigned By    Initials Name Provider Type    Ann Shane, PT Physical Therapist               Goals/Plan    No documentation.                Clinical Impression     Row Name 05/09/22 1316          Pain    Pretreatment Pain Rating 7/10  -EJ     Pain Location - Side/Orientation Right  -EJ     Pain Location - knee  -EJ     Pain Intervention(s) Repositioned;Ambulation/increased activity  -     Row Name 05/09/22 1316          Plan of Care Review    Plan of Care Reviewed With patient;spouse  -     Progress improving  -EJ     Outcome Evaluation Pt seen for PT this afternoon. SHe is doing well, s/p R TKR. She presents w expected post op pain and weakness. Pt plans to DC home today. She was able to tolerate all knee exercises without difficulty. She then ambulated approx 80 ft w Rwx and CGA. No unsteadiness noted. Pt plans home w assist of  and HHPT. All questions answered at this time. Pt is safe to DC home from PT standpoint.  -     Row Name 05/09/22 1316          Therapy Assessment/Plan (PT)    Patient/Family Therapy Goals Statement (PT) go home  -     Row Name 05/09/22 1316          Positioning and Restraints    Pre-Treatment Position in bed  -EJ     Post Treatment Position bed  -EJ     In Bed notified nsg;supine;call light within reach;encouraged to call for assist;with family/caregiver  -           User Key  (r) = Recorded By, (t) = Taken By, (c) = Cosigned By    Initials Name Provider Type    Ann Shane, PT Physical Therapist               Outcome Measures     Row Name 05/09/22 1318          How much help from another person do you currently need...    Turning from your back to your side while in flat bed without using bedrails? 4  -EJ     Moving from lying on back to sitting on the side of a flat bed without bedrails? 4  -EJ     Moving to and from a bed to a chair (including a  wheelchair)? 3  -EJ     Standing up from a chair using your arms (e.g., wheelchair, bedside chair)? 3  -EJ     Climbing 3-5 steps with a railing? 3  -EJ     To walk in hospital room? 3  -EJ     AM-PAC 6 Clicks Score (PT) 20  -EJ     Highest level of mobility 6 --> Walked 10 steps or more  -     Row Name 05/09/22 1318          Functional Assessment    Outcome Measure Options AM-PAC 6 Clicks Basic Mobility (PT)  -           User Key  (r) = Recorded By, (t) = Taken By, (c) = Cosigned By    Initials Name Provider Type    Ann Shane, PT Physical Therapist                               PT Recommendation and Plan     Plan of Care Reviewed With: patient, spouse  Progress: improving  Outcome Evaluation: Pt seen for PT this afternoon. SHe is doing well, s/p R TKR. She presents w expected post op pain and weakness. Pt plans to DC home today. She was able to tolerate all knee exercises without difficulty. She then ambulated approx 80 ft w Rwx and CGA. No unsteadiness noted. Pt plans home w assist of  and HHPT. All questions answered at this time. Pt is safe to DC home from PT standpoint.     Time Calculation:    PT Charges     Row Name 05/09/22 1318             Time Calculation    Start Time 1215  -EJ      Stop Time 1242  -EJ      Time Calculation (min) 27 min  -      PT Received On 05/09/22  -              Time Calculation- PT    Total Timed Code Minutes- PT 18 minute(s)  -            User Key  (r) = Recorded By, (t) = Taken By, (c) = Cosigned By    Initials Name Provider Type    Ann Shane, PT Physical Therapist              Therapy Charges for Today     Code Description Service Date Service Provider Modifiers Qty    41168753048 HC PT EVAL LOW COMPLEXITY 2 5/9/2022 Ann Morales, PT GP 1    [id number] HC PT THER PROC EA 15 MIN 5/9/2022 Ann Morales, PT GP 1          PT G-Codes  Outcome Measure Options: AM-PAC 6 Clicks Basic Mobility (PT)  AM-PAC 6 Clicks Score (PT):  20    Ann Morales, PT  5/9/2022

## 2022-05-09 NOTE — ANESTHESIA PROCEDURE NOTES
Airway  Urgency: elective    Date/Time: 5/9/2022 8:23 AM  Airway not difficult    General Information and Staff    Patient location during procedure: OR  Anesthesiologist: Miguel Ángel Figueroa MD  CRNA/CAA: Aria Drew CRNA    Indications and Patient Condition  Indications for airway management: airway protection    Preoxygenated: yes  MILS not maintained throughout  Mask difficulty assessment: 1 - vent by mask    Final Airway Details  Final airway type: endotracheal airway      Successful airway: ETT  Cuffed: yes   Successful intubation technique: direct laryngoscopy  Facilitating devices/methods: intubating stylet  Blade: Meño  Blade size: 4  ETT size (mm): 7.0  Cormack-Lehane Classification: grade I - full view of glottis  Placement verified by: chest auscultation and capnometry   Cuff volume (mL): 8  Measured from: teeth  ETT/EBT  to teeth (cm): 21  Number of attempts at approach: 1  Assessment: lips, teeth, and gum same as pre-op and atraumatic intubation

## 2022-05-09 NOTE — CASE MANAGEMENT/SOCIAL WORK
Continued Stay Note  [facility]     Patient Name: Kalpana Hendrix  MRN: [id number]  Today's Date: 5/9/2022    Admit Date: 5/9/2022     Discharge Plan     Row Name 05/09/22 1229       Plan    Plan Home with Mosque     Plan Comments Home with Mosque  set up by Dr. Honeycutt office.               Discharge Codes    No documentation.               Expected Discharge Date and Time     Expected Discharge Date Expected Discharge Time    May 9, 2022             Shannon Epley, RN

## 2022-05-09 NOTE — DISCHARGE PLACEMENT REQUEST
"Naveen Chaves (57 y.o. Female)             Date of Birth   1965    Social Security Number       Address   [address] [place] [address]    Home Phone   [phone]    MRN   [id number]       S[employer]ism   [employer]    Marital Status                               Admission Date   5/9/22    Admission Type   Elective    Admitting Provider   Bakari Duran II, MD    Attending Provider   Bakari Duran II, MD    Department, Room/Bed   [facility] MAIN OR, [facility] Main OR/MAIN OR       Discharge Date       Discharge Disposition   Home or Self Care    Discharge Destination                               Attending Provider: Bakari Duran II, MD    Allergies: No Known Allergies    Isolation: None   Infection: None   Code Status: CPR   Advance Care Planning Activity    Ht: 177.8 cm (70\")   Wt: 102 kg (224 lb 6.9 oz)    Admission Cmt: None   Principal Problem: None                Active Insurance as of 5/9/2022     Primary Coverage     Payor Plan Insurance Group Employer/Plan Group    [facility] [id number]     Payor Plan Address Payor Plan Phone Number Payor Plan Fax Number Effective Dates    [address]   1/1/2022 - None Entered    [facility] [id number]       Subscriber Name Subscriber Birth Date Member ID       NAVEEN CHAVES 1965 [id number]                 Emergency Contacts      (Rel.) Home Phone Work Phone Mobile Phone    CHAVESHENRY RIVERO (Spouse) -- -- [phone]            "

## 2022-05-09 NOTE — DISCHARGE INSTRUCTIONS
Total Knee  Discharge Instructions  Dr. LEIF Shine” Roger II  [phone]    INCISION CARE  Wash your hands prior to dressing changes  WANDY Wound VAC: Postoperatively you had a WANDY Wound Vac placed on the incision. This was placed under sterile conditions in the operating room. It remains in place for 7 days postoperatively. After 7 days, the entire dressing must be removed, including all of the sticky adhesive. The dressing and battery pack provide gentle suction to the incision and provide several benefits over a traditional dressing:  It maintains the sterile environment of the OR and reduces the risk of infection  The suction removes unwanted buildup of blood/hematoma under the skin to reduce swelling  The suction also promotes fresh blood supply to the skin and soft tissue to speed up healing  The postoperative scar is reduced in size  Showering is permitted immediately after surgery, but the battery pack must be protected or removed during the shower.   After 7 days the WANDY Wound Vac is removed. If there is no drainage, no dressing is required. If there is some scant drainage a dry bandage can be applied and changed daily until seen in the office or until the drainage stops.   No creams or ointments to the incisions until 4 weeks post op.  Do not touch or pick at the incision  Check incision every day and notify surgeon immediately if any of the following signs or symptoms are seen:  Increase in redness  Increase in swelling around the incision and of the entire extremity  Increase in pain  NEW drainage or oozing from the incision  Pulling apart of the edges of the incision  Increase in overall body temperature (greater than 100.4 degrees)  Zip-Line: your incision was closed with a state of the art device.   Is a non-invasive and easy to use wound closure device that replaces sutures, staples and glue for surgical incisions  It minimizes scarring and eliminating “railroad” marks that come with staples or  sutures  It makes removal as atraumatic as peeling off a bandage  Can be removed at home or by a physical therapist or nurse at 14 days postoperatively    ACTIVITIES  Exercises:  Physical therapy will begin immediately while in the hospital. Patients going to a nursing home will get therapy as part of their care at the SNU/SNF facility. Patients going home may also have a therapist come to the house to help them mobilize until they can safely get to an outpatient therapy facility.  Elevate the affected leg most of the day during the first week post operatively. Caution must be taken to avoid pillow placement directly under the heel of the leg, as this can cause pressure ulcers even with a soft pillow. All pillows and blankets should be placed underneath of the thigh and calf so that the heel is free-floating.  Use cold packs for 20-30 minutes approximately 5 times per day.  You should perform the daily stretching and strengthening exercises as taught by the therapist as often as possible. This can be done many times a day.  Full weight bearing is allowed after surgery. It will be sore/painful to put weight on the leg, but this will help the bone to heal and prevent complications such as pneumonia, bed sores and blood clots. Mobilization is vital to the recovery process.  Activities of Daily Living:  No tub baths, hot tubs, or swimming pools for 4 weeks.  May shower and let water run over the incision immediately after surgery. The battery pack of the WANDY Wound Vac must be protected or removed while in the shower. After the WANDY is removed 7 days after surgery showering is permitted as long as there is no drainage from the wound.     Restrictions  Weight: It is ok to allow full weight bearing after surgery. Weight on the leg actually quickens the recovery process. While it will be sore/painful to put weight on the leg, it is safe to do so. Hip replacement after hip fracture has a much slower recover process. It can  take months to heal fully from a hip fracture and patients even make some slow benefits up to a year afterwards.   Driving: Many patients have questions about when it is safe to return to driving. The answer is that this is extremely variable. It depends on the extent of the surgery, as well as how quickly you heal. Certainly left leg surgeries make returning to driving easier while right leg surgeries require more extensive rehabilitation before driving can be safe. Until you can press down on the brake hard, and are off of all narcotics, driving is not permitted. Your surgeon cannot “clear” you to return to driving, only you can make the decision when you feel it is safe.    Medications  Anticoagulants: After upper extremity surgery most patients do not require an anticoagulant unless you have another injury that will be keeping you from mobilizing. Lower extremity surgery typically does require use of an anticoagulation medicine.   IF YOU HAD LOWER EXTREMITY SURGERY AND ARE NOT DISCHARGED HOME WITH ANY ANTICOAGULANT MEDICINE YOU SHOULD TAKE ASPIRIN 325mg DAILY FOR 30 DAYS POSTOPERATIVELY.  If you are discharged home with an anticoagulant such as Aspirin, Xarelto, Eliquis, Coumadin, or Lovenox, follow these simple instructions:   Notify surgeon immediately if any barak bleeding is noted in the urine, stool, emesis, or from the nose or the incision. Blood in the stool will often appear as black rather than red. Blood in urine may appear as pink. Blood in emesis may appear as brown/black like coffee grounds.  You will need to apply pressure for longer periods of time to any cuts or abrasions to stop bleeding  Avoid alcohol while taking anticoagulants  Most anticoagulants are to be taken for 30 days postoperatively. After this time, you may stop using them unless instructed otherwise.   If you were already taking an anticoagulant (commonly Aspirin, Coumadin, or Plavix) you will likely be resuming your normal dose  postoperatively and will be continuing that medication at the discretion of the prescribing physician.  Stool Softeners: You will be at greater risk of constipation after surgery due to being less mobile and the pain medications.  Take stool softeners as needed. Over the counter Colace 100 mg 1-2 capsules twice daily can be taken.  If stools become too loose or too frequent, please decreases the dosage or stop the stool softener.  If constipation occurs despite use of stool softeners, you are to continue the stool softeners and add a laxative (Milk of Magnesia 1 ounce daily as needed)  Drink plenty of fluids, and eat fruits and vegetables during your recovery time. Getting up and mobilizing will help the bowels to recover their regular function, as will weaning off of all narcotics when the pain becomes tolerable.  Pain Medications: Utilized after surgery are narcotics. This is some general information about these medications.  CLASSIFICATION: Pain medications are called Opioids and are narcotics  LEGALITIES: It is illegal to share narcotics with others  DRIVING: it is illegal to drive while under the influence of narcotics. Doing so is a DUI.  POTENTIAL SIDE EFFECTS: nausea, vomiting, itching, dizziness, drowsiness, dry mouth, constipation, and difficulty urinating.  POTENTIAL ADVERSE EFFECTS:  Opioid tolerance can develop with use of pain medications and this simply means that it requires more and more of the medication to control pain. However, this is seen more in patients that use opioids for longer periods of time.  Opioid dependence can develop with use of Opioids. People with opioid dependence will experience withdrawal symptoms upon cessation of the medication.  Opioid addiction can develop with use of Opioids. The incidence of this is very unlikely in patients who take the medications as ordered and stop the medications as instructed.  Opioid overdose can be dangerous, but is unlikely when the medication  is taken as ordered and stopped when ordered. It is important not to mix opioids with alcohol as this can lead to over sedation and respiratory difficulty.  DOSAGE:  After the initial surgical pain begins to resolve, you may begin to decrease the pain medication. By the end of a few weeks, you should be off of pain medications.  Refills will not be given by the office during evening hours, on weekends, or after 6 weeks post-op. You are responsible for weaning off of pain medication. You can increase the time between narcotic pills, taking one every 4 then 6 then 8 hours and so on.  To seek refills on pain medications during the initial 6-week post-operative period, you must call the office to request the refill. The office will then notify you when to  the prescription. DO NOT wait until you are out of the medication to request a refill. Prescriptions will not be filled over the weekend and depending on the schedule, it may take a couple days for the prescription to be available. Someone will have to pick the prescription in person at the office.    FOLLOW-UP VISITS  You will need to follow up in the office with your surgeon in 3 weeks, or as instructed elsewhere in your discharge paperwork. Please call this number [phone] to schedule this appointment. If you are going to an SNF/SNU facility, they will arrange for you to follow up in the office.  If you have any concerns or suspected complications prior to your follow up visit, please call the office. Do not wait until your appointment time if you suspect complications. These will need to be addressed in the office promptly.      Bakari Duran II, MD  Orthopaedic Surgery  [facility]

## 2022-05-09 NOTE — PLAN OF CARE
Goal Outcome Evaluation:  Plan of Care Reviewed With: patient, spouse        Progress: improving  Outcome Evaluation: Pt seen for PT this afternoon. SHe is doing well, s/p R TKR. She presents w expected post op pain and weakness. Pt plans to DC home today. She was able to tolerate all knee exercises without difficulty. She then ambulated approx 80 ft w Rwx and CGA. No unsteadiness noted. Pt plans home w assist of  and HHPT. All questions answered at this time. Pt is safe to DC home from PT standpoint.

## 2022-05-09 NOTE — ANESTHESIA POSTPROCEDURE EVALUATION
Patient: Kalpana Hendrix    Procedure Summary     Date: 05/09/22 Room / Location: [facility] OR [address] [place] [address] MAIN OR    Anesthesia Start: 0813 Anesthesia Stop: 1009    Procedure: TOTAL KNEE ARTHROPLASTY WITH CORI ROBOT (Right Knee) Diagnosis:     Surgeons: Bakari Duran II, MD Provider: Miguel Ángel Figueroa MD    Anesthesia Type: general ASA Status: 2          Anesthesia Type: general    Vitals  Vitals Value Taken Time   /73 05/09/22 1116   Temp 36.6 °C (97.9 °F) 05/09/22 1006   Pulse 51 05/09/22 1131   Resp 16 05/09/22 1130   SpO2 93 % 05/09/22 1131   Vitals shown include unvalidated device data.        Post Anesthesia Care and Evaluation    Patient location during evaluation: bedside  Patient participation: complete - patient participated  Level of consciousness: awake and alert  Pain management: adequate  Airway patency: patent  Anesthetic complications: No anesthetic complications    Cardiovascular status: acceptable  Respiratory status: acceptable  Hydration status: acceptable    Comments: /66 (BP Location: Right arm, Patient Position: Lying)   Pulse 55   Temp 36.6 °C (97.9 °F) (Oral)   Resp 16   Wt 102 kg (224 lb 6.9 oz)   SpO2 95%   BMI 32.20 kg/m²

## 2022-05-09 NOTE — OP NOTE
ROBOTIC Total Knee Replacement Operative Note  Dr. LEIF Duran II  [phone]    PATIENT NAME: Kalpana Hendrix  MRN: [id number]  : 1965 AGE: 57 y.o. GENDER: female  DATE OF OPERATION: 2022  PREOPERATIVE DIAGNOSIS: End Stage Arthritis  POSTOPERATIVE DIAGNOSIS: Same  OPERATION PERFORMED: Right Robotic Assisted Total Knee Arthroplasty  SURGEON: Bakari Duran MD  Circulator: Magalie Arreaga RN  Scrub Person: Devaughn Llanos  Vendor Representative: Kisha Vaca; Alexey Kim  Assistant: Ina Forrest PA  Other: Fely Cook  ANESTHESIA: General  ASSISTANT: NITESH Jarrett. This case would not have been possible without another set of skilled surgical hands for retraction, use of instrumentation, and general assistance.  This assistance was vital to the success of the case.   ESTIMATED BLOOD LOSS: 50cc  SPONGE AND NEEDLE COUNT: Correct  INDICATIONS:   A discussion of operative versus nonoperative treatment was had with the patient and they failed conservative management. They elected to undergo total knee arthroplasty. The risks of surgery were discussed and included the risk of anesthesia, infection, damage to neurovascular structures, implant loosening/failure, fracture, hardware prominence, continued pain, early failure, the need for further procedures, medical complications, and others. No guarantees were made. The patient wished to proceed with surgery and a surgical consent was signed.    COMPONENTS:   · Journey II BCS Oxinium Femoral Component: Size 7  · Journey II Tibial Baseplate: 5   · Posterior Stabilized Insert: 11  · Patella: 32mm    POSTOP ROBOTIC ROM: Extension: 0 Flexion: 122    PERTINENT FINDINGS: Degenerative Arthritis    DETAILS OF PROCEDURE:  The patient was met in the preoperative area. The site was marked. The consent and H&P were reviewed. The patient was then wheeled back to the operative suite and transferred to the operative table. The patient  underwent anesthesia. A tourniquet was placed on the upper thigh. Surgical alcohol was used to thoroughly clean the entire operative extremity.     The leg was then prepped in the normal sterile fashion and surgical space suits were used for the entire operative team. New outer gloves were used by all sterile surgical team members after final draping. After a surgical timeout, the tourniquet was inflated.     I began by inserting 2 pins into the tibial shaft and attaching the tibial robotic .    In flexion, a midline knee incision was utilized centered on the patella and ending medial to the tibial tubercle. Dissection was carried down to the knee capsule. A medial parapatellar ararthrotomy was completed. The patellar fat pad was excised. The MCL was minimally elevated to gain adequate exposure to the knee.  The suprapatellar fat pad was also excised and then two femoral pins were placed proximal to the medial femoral condyle and then the femoral robotic  was attached.  I then inserted the femoral and tibial buttons.     The patella was subluxed laterally. The patella was held vertical using 2 clamps, and was then cut using a saw. The patella was then sized, and the lug holes were drilled. Excess patellar bone was removed using a saw. The patella was then protected during this case using the metal patella shield.    The ACL, meniscus, and PCL were then resected.  Next I proceeded with a standard mapping of the knee including all relevant anatomic positions, range of motion, and stressing of ligaments.  I then planned out the knee including implant sizes, rotation, and bony resection to appropriately balance the knee as needed.      After the mapping and planning was complete, I turned my attention to the distal femur.  Using the bur, I was able to remove the distal femoral bone and create the initial lug holes.  I then used the punch to create the pinholes for the 4-in-1 cutting block.  The 4-in-1  cutting block was then snapped into place and pinned.  I used a saw to resect the anterior posterior and chamfer cuts.  These were all removed using a rongeur.    I then turned my attention to the tibia.  Retractors were placed appropriately.  Using the robot for guidance, a cutting jig was attached to the tibia using 2 pins.  This was done just above the level of measured resection.  I then used a saw to cut the tibia and remove this bone.  At that point, I was able to use the bur to resect down to the actual intended target tibial resection line.  This was verified to be accurate afterwards on the robot screen.    At that point, the remaining meniscus and osteophytes were removed.  I then attached the femoral component which was well-seated.  The box was then prepared for.  I then trialed the knee and was noted to be in excellent balance with good range of motion.    We next turned our attention back to the tibia to finish the tibial preparation. The tibia was measured and sized. The tibial plate was aligned with the rotation from the trialing process and verified to be positioned near the medial third of the tibial tubercle. The tibial surface was then prepared for the keel.     The knee was thoroughly irrigated with sterile saline using a pulse-lavage system while the final tibial baseplate, femoral component and patellar component were opened. Cement was prepared and mixed using standard techniques. Outer gloves were changed before implant handling to ensure no soft tissue or oily material was exposed to the surfaces of the final implants. The bony knee surfaces were dried and the implants were cemented in place, starting with the tibia, then the femur and finally the patella. Excess cement was removed at each step. A trial poly was utilized during cementation for compression. The tourniquet was taken down and adequate hemostasis was achieved. The knee was thoroughly irrigated once again.     The soft tissues  "about the knee were then injected with an anesthetic cocktail. Care was taken to avoid the peroneal nerve and the neurovascular bundle posteriorly. The cement was allowed to harden.  While the cement was hardening, I did remove all 4 pins and the tibial and femoral buttons.  The tibial pin sites were closed using surgical glue and Steri-Strips.    After the cement was fully set, the knee was ranged with various thickness of polyethylene trials to ensure that the balance was appropriate after robotic resection. The knee was inspected for excess cement, which was removed. The real poly, of corresponding thickness was then opened and inserted into the knee. One final range of motion and stability test showed the knee to be in good condition with a well tracking patellar component.    The knee capsule was then closed with a running barbed suture. The knee was then closed in layers.  A sterile dressing was applied.    The patient was awoken from anesthesia, moved to the [facility] and taken to the recovery room in stable condition. Sponge and needle count were correct. There were no complications. Patient tolerated the procedure well.    R \"Rajeev\" Roger GARCIA MD  Orthopaedic Surgery  [facility]  [phone]                  "

## 2022-05-09 NOTE — PROGRESS NOTES
[facility] given careplan per Debby rae for home PT.  Noted Dr Duran's order in and note that he will sign our plan of treatment and follow.  Careplan faxed to office for onboarding.  Phoned spouse and verified all info on facesheet.  Spouse states plan is same day D/C today.

## 2022-05-09 NOTE — H&P
Orthopaedic Surgery  History & Physical For Elective Total Knee  Dr. LEIF Duran II  [phone]    HPI:  Patient is a 57 y.o. Not  or  female who presents with End-stage arthritis of the right knee. They failed conservative treatment of their knee pain and a thorough discussion of the risks and benefits of surgery was had. The patient wishes to continue with elective total knee replacement, they were scheduled and are here for surgery. They did get medical clearance as well as a thorough preoperative workup.    MEDICAL HISTORY  Past Medical History:   Diagnosis Date   • Anxiety    • Asthma     MILD   • History of COVID-19 07/2021   • Hyperlipidemia    • Knee pain, right    • Nodule of right lung    • Right knee injury    • Subclavian artery stenosis (HCC)    ·   Past Surgical History:   Procedure Laterality Date   • TUBAL ABDOMINAL LIGATION     • WISDOM TOOTH EXTRACTION     ·   Prior to Admission medications    Medication Sig Start Date End Date Taking? Authorizing Provider   aspirin 81 MG EC tablet Take 81 mg by mouth Daily.   Yes Taco Garza MD   Chlorhexidine Gluconate Cloth 2 % pads Apply  topically. AS DIRECTED PREOP   Yes Taco Garza MD   Magnesium 250 MG tablet Take 1 tablet by mouth 2 (Two) Times a Day As Needed.   Yes Taco Garza MD   mupirocin (BACTROBAN) 2 % nasal ointment into the nostril(s) as directed by provider. AS DIRECTED PREOP   Yes Taco Garza MD   omeprazole (priLOSEC) 40 MG capsule TAKE 1 CAPSULE BY MOUTH EVERY DAY 7/30/21  Yes Junior Rios MD   Unable to find Hydrolyzed collagen powder - once daily   Yes Taco Garza MD   albuterol sulfate  (90 Base) MCG/ACT inhaler Inhale 2 puffs Every 6 (Six) Hours As Needed for Wheezing or Shortness of Air. 11/8/21   Junior Rios MD   aspirin-acetaminophen-caffeine (EXCEDRIN MIGRAINE) 250-250-65 MG per tablet Take 1 tablet by mouth Every 6 (Six) Hours As Needed for  Headache.    Taco Garza MD   calcium carbonate EX (TUMS EX) 750 MG chewable tablet Chew 750 mg. Two or three tabs 4 to 5 times daily as needed    Taco Garza MD   naproxen sodium (ALEVE) 220 MG tablet Take 440 mg by mouth daily as needed for mild pain (1-3).    Taco Garza MD   Specialty Vitamins Products (MENOPAUSE RELIEF PO) Take 2 tablets by mouth Daily.    Taco Garza MD   TiZANidine (ZANAFLEX) 2 MG capsule TAKE 2 CAPSULES BY MOUTH 3 TIMES A DAY AS NEEDED FOR MUSCLE SPASMS 7/30/21   Junior Rios MD   ·   · No Known Allergies  ·   · There is no immunization history on file for this patient.  Social History     Tobacco Use   • Smoking status: Current Some Day Smoker     Types: Cigarettes   • Smokeless tobacco: Never Used   • Tobacco comment: RANDOM SMOKER , VARIES WITH ANXIETY   Substance Use Topics   • Alcohol use: Yes     Alcohol/week: 7.0 standard drinks     Types: 7 Shots of liquor per week     Comment: Socially   ·    Social History     Substance and Sexual Activity   Drug Use No   ·     REVIEW OF SYSTEMS:  · Head: negative for headache  · Respiratory: negative for shortness of breath.   · Cardiovascular: negative for chest pain.   · Gastrointestinal: negative abdominal pain.   · Neurological: negative for LOC  · Psychiatric/Behavioral: negative for memory loss.   · All other systems reviewed and are negative    VITALS: /93 (BP Location: Right arm, Patient Position: Lying)   Pulse 58   Temp 97.7 °F (36.5 °C) (Oral)   Resp 18   Wt 102 kg (224 lb 6.9 oz)   SpO2 97%   BMI 32.20 kg/m²  Body mass index is 32.2 kg/m².    PHYSICAL EXAM:   · CONSTITUTIONAL: A&Ox3, No acute distress  · LUNGS: Equal chest rise, no shortness of air  · CARDIOVASCULAR: palpable peripheral pulses  · SKIN: no skin lesions in the area examined  · LYMPH: no lymphadenopathy in the area examined  · EXTREMITY: Knee  · Pulses:  Brisk Capillary Refill  · Sensation: Intact to Saphenous,  Sural, Deep Peroneal, Superficial Peroneal, and Tibial Nerves and grossly throughout extremity  · Motor: 5/5 EHL/FHL/TA/GS motor complexes    RADIOLOGY REVIEW:   No radiology results for the last 7 days    LABS:   Results for the past 24 hours: No results found for this or any previous visit (from the past 24 hour(s)).    IMPRESSION:  Patient is a 57 y.o. Not  or  female with end-stage arthritis of the right knee    PLAN:   · Surgery: Elective total knee arthroplasty  · Consent: The risks and benefits of operative versus nonoperative treatment were discussed. The patient elected to undergo operative treatment of their knee arthritis. The risks discussed included but were not limited to blood clots, MI, stroke, other medical complications, infection, damage to neurovascular structures, continued pain, hardware prominence, loss of range of motion, need for further procedures, and and risk of anesthesia..  No guarantees were made   · Disposition: Elective right Total Knee Arthroplasty today.    Bakari Duran II, MD  Orthopaedic Surgery  [facility]

## 2022-05-09 NOTE — ANESTHESIA PREPROCEDURE EVALUATION
Anesthesia Evaluation     Patient summary reviewed and Nursing notes reviewed   no history of anesthetic complications:  NPO Solid Status: > 6 hours  NPO Liquid Status: > 6 hours           Airway   Mallampati: II  TM distance: >3 FB  Neck ROM: full  no difficulty expected and No difficulty expected  Dental - normal exam   (+) implants        Pulmonary - normal exam    breath sounds clear to auscultation  (+) a smoker Current, asthma,  (-) rhonchi, decreased breath sounds, wheezes, rales, stridor  Cardiovascular - normal exam    NYHA Classification: I  ECG reviewed  Rhythm: regular  Rate: normal    (+) hyperlipidemia,   (-) murmur, weak pulses, friction rub, systolic click, carotid bruits, JVD, peripheral edema      Neuro/Psych  (+) psychiatric history Anxiety,    GI/Hepatic/Renal/Endo    (+) obesity,  GERD well controlled, GI bleeding lower resolved,     Musculoskeletal (-) negative ROS    Abdominal  - normal exam    Abdomen: soft.   Substance History - negative use     OB/GYN negative ob/gyn ROS         Other - negative ROS                       Anesthesia Plan    ASA 2     general     intravenous induction     Anesthetic plan, all risks, benefits, and alternatives have been provided, discussed and informed consent has been obtained with: patient.        CODE STATUS:

## 2022-05-10 ENCOUNTER — HOME CARE VISIT (OUTPATIENT)
Dept: HOME HEALTH SERVICES | Facility: HOME HEALTHCARE | Age: 57
End: 2022-05-10

## 2022-05-10 VITALS
DIASTOLIC BLOOD PRESSURE: 68 MMHG | SYSTOLIC BLOOD PRESSURE: 128 MMHG | OXYGEN SATURATION: 98 % | HEART RATE: 80 BPM | TEMPERATURE: 97.4 F

## 2022-05-10 PROCEDURE — G0151 HHCP-SERV OF PT,EA 15 MIN: HCPCS

## 2022-05-10 NOTE — HOME HEALTH
Reason for referral Patient has decreased strength, activity tolerance, difficulty with transfers, decreased ROM, abnormal gait due to TKR    Diagnosis Patient in [facility] 5/9/22 OA right knee     surgical procedure right TKR     PMHx OA, nodules in lungs, asthma     Prior level of function Patient ambulated independently without assistive device, independent with ADLs, IADLs, works at [employer].    Home social environment Patient resides with spouse with 1 step to enter home.     Next MD appointment 6/1/22 starts out patient 5/23/22

## 2022-05-11 ENCOUNTER — TELEPHONE (OUTPATIENT)
Dept: ORTHOPEDIC SURGERY | Facility: HOSPITAL | Age: 57
End: 2022-05-11

## 2022-05-11 ENCOUNTER — HOME CARE VISIT (OUTPATIENT)
Dept: HOME HEALTH SERVICES | Facility: HOME HEALTHCARE | Age: 57
End: 2022-05-11

## 2022-05-11 VITALS
DIASTOLIC BLOOD PRESSURE: 82 MMHG | TEMPERATURE: 97.2 F | OXYGEN SATURATION: 98 % | SYSTOLIC BLOOD PRESSURE: 142 MMHG | HEART RATE: 94 BPM

## 2022-05-11 PROCEDURE — G0151 HHCP-SERV OF PT,EA 15 MIN: HCPCS

## 2022-05-11 NOTE — TELEPHONE ENCOUNTER
Post op day 2  Discharge Instructions:  Ask patient about his or her discharge instructions  ?  Patient confirmed understanding   ?  Further instruction needed   What, if any, recommendations, teaching, or interventions did you provide? Click or tap here to enter text.  Health status:  Pain controlled No   Said she is taking the medication and actually she had to double up on them just to make it through PT and the incision burns so bad. She said the pain medication doesn’t even really seem to be touching it.   Recommended interventions:  Yes  Ice/Elevation Rest/Relaxation--She said she does this and is using the ice machine. The swelling isn’t bad, but the pain is out of control.   incision/dressing status   ?  Clean without redness, drainage, odor  ?  Redness    ?  Drainage - color--Bleeding The WANDY has been changed twice D/T bleeding. Now there is just an island dressing in place. She hasn’t looked at it as it is covered and she has the ice machine on, but she is concerned with the amount of bleeding she has.   ?  Odor  WANDY - Green light blinking No  The WANDY has been changed twice, she doesn’t have another WANDY dressing. There is an island dressing in place now.   Difficulties urination No  Last BM 5/9/2022 (if no BM by day 3-recommend OTC suppository or fleets enema)  She hasn’t had a BM. She is taking the stool softener. She doesn’t have an appetite. She isn’t uncomfortable or feel constipated. Encouraged small meals. She said she is drinking a ton of water.   Medications:  ?Medications reviewed with patient/family/caregiver  Patient taking medications as prescribed?   Yes  If not taking medications as prescribed, note specific medicine(s) and reason for each:  Click or tap here to enter text.  Hospital Follow Up Plan:  Follow up Appointment with Orthopedic surgeon:  ?Has f/u appointment                ?Scheduled f/u appointment  Home Care ordered at discharge?    Yes        Home Care started, or contact  made?    Yes   If no, action taken: Click or tap here to enter text.  DME obtained/used in home?         Yes   Other information: Ms. Hendrix said she is struggling. She is having a lot of pain. Has been doubling up on the pain medication and it still doesn’t seem to do much. PT has been out to see her. They have had to change the dressing twice because of increased bleeding. Ms. Hendrix said that PT did reach out to MD regarding the bleeding and dressing. She said the incision burns all the time. She is using an ice machine and that seems to help some. She doesn’t have much swelling but she is making sure to keep her leg elevated. She doesn’t have any questions for me; just wanted me to pass along to MD the issues with pain and bleeding. Told her I would see what I could find out for her. Ms. Hendrix has my contact information should she need anything. She voiced understanding.

## 2022-05-11 NOTE — CASE COMMUNICATION
Patient is s/p right TKR. Patient has all DME. Patient is having expected edema, having pain 8-9/10, reviewed pain control, elevation and use of ice. Patient required adrian to be changed due to it being saturated. Patient still having bleeding so will be seeing patient again on 5/11/22 to check dressing. Patient is doing fairly well with transfers, is ambulating with rolling walker with limited knee flexion with swing. Patient performed  10-15 reps of TKR protocol with verbal cues. ROM -10 to 80 degrees. Will see patient 3x/wk x 2wks. She is set up for out patient on 5/23/22

## 2022-05-11 NOTE — HOME HEALTH
Patient reports having a lot of pain, having to take 2 pain pills. They adrian is saturated again with blood.     Phoned MD office and left message regarding saturated adrian dressing.    Plan for next visit  Progress with therapeutic exercise, transfers, ROM, gait

## 2022-05-13 ENCOUNTER — HOME CARE VISIT (OUTPATIENT)
Dept: HOME HEALTH SERVICES | Facility: HOME HEALTHCARE | Age: 57
End: 2022-05-13

## 2022-05-13 VITALS
TEMPERATURE: 97.2 F | HEART RATE: 88 BPM | SYSTOLIC BLOOD PRESSURE: 144 MMHG | OXYGEN SATURATION: 98 % | DIASTOLIC BLOOD PRESSURE: 90 MMHG

## 2022-05-13 PROCEDURE — G0151 HHCP-SERV OF PT,EA 15 MIN: HCPCS

## 2022-05-13 NOTE — HOME HEALTH
Patient reports the pain is better, the bleeding has stopped. She is using the cane some.     Plan for next visit  Progress with therapeutic exercise, transfers, gait

## 2022-05-16 ENCOUNTER — HOME CARE VISIT (OUTPATIENT)
Dept: HOME HEALTH SERVICES | Facility: HOME HEALTHCARE | Age: 57
End: 2022-05-16

## 2022-05-16 VITALS
TEMPERATURE: 96.9 F | OXYGEN SATURATION: 96 % | HEART RATE: 96 BPM | DIASTOLIC BLOOD PRESSURE: 88 MMHG | SYSTOLIC BLOOD PRESSURE: 140 MMHG

## 2022-05-16 PROCEDURE — G0151 HHCP-SERV OF PT,EA 15 MIN: HCPCS

## 2022-05-16 NOTE — HOME HEALTH
Patient reports she tripped over her husbands shoe yesterday and twisted her knee so she is hurting today.    Plan for next visit  Progress with therapeutic exercise, transfers, gait

## 2022-05-17 ENCOUNTER — HOME CARE VISIT (OUTPATIENT)
Dept: HOME HEALTH SERVICES | Facility: HOME HEALTHCARE | Age: 57
End: 2022-05-17

## 2022-05-19 ENCOUNTER — HOME CARE VISIT (OUTPATIENT)
Dept: HOME HEALTH SERVICES | Facility: HOME HEALTHCARE | Age: 57
End: 2022-05-19

## 2022-05-19 VITALS
OXYGEN SATURATION: 98 % | SYSTOLIC BLOOD PRESSURE: 148 MMHG | TEMPERATURE: 96.9 F | HEART RATE: 88 BPM | DIASTOLIC BLOOD PRESSURE: 96 MMHG

## 2022-05-19 PROCEDURE — G0151 HHCP-SERV OF PT,EA 15 MIN: HCPCS

## 2022-05-23 ENCOUNTER — TELEPHONE (OUTPATIENT)
Dept: ORTHOPEDIC SURGERY | Facility: HOSPITAL | Age: 57
End: 2022-05-23

## 2022-05-23 ENCOUNTER — TREATMENT (OUTPATIENT)
Dept: PHYSICAL THERAPY | Facility: CLINIC | Age: 57
End: 2022-05-23

## 2022-05-23 DIAGNOSIS — Z74.09 IMPAIRED FUNCTIONAL MOBILITY, BALANCE, GAIT, AND ENDURANCE: ICD-10-CM

## 2022-05-23 DIAGNOSIS — Z96.651 S/P TKR (TOTAL KNEE REPLACEMENT), RIGHT: Primary | ICD-10-CM

## 2022-05-23 DIAGNOSIS — M25.561 ACUTE PAIN OF RIGHT KNEE: ICD-10-CM

## 2022-05-23 PROCEDURE — 97140 MANUAL THERAPY 1/> REGIONS: CPT | Performed by: PHYSICAL THERAPIST

## 2022-05-23 PROCEDURE — 97162 PT EVAL MOD COMPLEX 30 MIN: CPT | Performed by: PHYSICAL THERAPIST

## 2022-05-23 PROCEDURE — 97110 THERAPEUTIC EXERCISES: CPT | Performed by: PHYSICAL THERAPIST

## 2022-05-23 NOTE — PROGRESS NOTES
Physical Therapy Initial Evaluation and Plan of Care    Patient: Kalpana Hendrix   : 1965  Diagnosis/ICD-10 Code:  S/P TKR (total knee replacement), right [Z96.651]  Referring practitioner: Bakari Duran*  Today's Date: 2022    Subjective Evaluation    History of Present Illness  Date of onset: 2022  Date of surgery: 2022  Mechanism of injury: Sprained right knee - slipped on ice on deck, knee popped and swelled  To UC couple of days later - meds  PMD - MRI = MMT and distal fragment as well as end stage OA  DR Duran - had cortisone injection - 2 weeks relief  Underwent Right TKR - out patient  HHPT - 5 visits - last visit on Friday - 2-* flexion   Taking Oxycodone - 2-3 times per day, ibu prn    Walks dogs        Patient Occupation: Merchandizer for [employer]  Current pain ratin  At best pain ratin  At worst pain ratin  Location: anterior medial knee, some posterior knee pain, swelling anteiror knee and lower leg  Quality: throbbing, dull ache, knife-like, pressure, sharp, discomfort and tight  Relieving factors: rest, support, ice, medications and change in position  Aggravating factors: squatting, standing, movement, stairs and prolonged positioning  Progression: improved    Social Support  Lives in: one-story house (Has steps to her deck)  Lives with: spouse    Diagnostic Tests  X-ray: abnormal  MRI studies: abnormal    Treatments  Previous treatment: medication and physical therapy  Current treatment: medication and physical therapy  Discharged from (in last 30 days): home health care  Patient Goals  Patient goal: GEt movement back, be released to drive Get back to gym           Objective          Observations     Additional Knee Observation Details  Post op dressing, swelling in the right knee and lower leg  Walks with decreased stance phase, push off and knee flexion on the right    Palpation     Right Tenderness of the lateral gastrocnemius, medial  gastrocnemius and vastus medialis.     Tenderness     Right Knee   Tenderness in the lateral joint line, medial joint line and patellar tendon.     Neurological Testing     Sensation     Knee     Right Knee   Diminished: light touch     Comments   Right light touch: decreased lateral knee, otherwise OK.     Active Range of Motion     Right Knee   Flexion: 97 degrees with pain  Extension: 2 degrees with pain  Extensor la degrees with pain    Patellar Mobility     Right Knee Patellar tendons within functional limits include the medial, lateral, superior and inferior.     Swelling     Right Knee Girth Measurement (cm)   Joint line: 47 cm  10 cm above joint line: 55 cm  10 cm below joint line: 45 cm          Assessment & Plan     Assessment  Impairments: abnormal gait, abnormal muscle firing, abnormal or restricted ROM, activity intolerance, impaired balance, impaired physical strength, lacks appropriate home exercise program, pain with function and weight-bearing intolerance  Functional Limitations: lifting, walking, pulling, pushing, standing and stooping  Assessment details: 57 y.o. female seen 2 weeks post Right TKR presents with: 1. Right knee pain, 2. Decreased AROM right knee, 3. Swelling right knee and lower leg, 4. Altered gait pattern, 5. Sleep interruption by pain, 6. Decreased tolerance for many normal ADL's and work as a merchandizer  Prognosis: good    Goals  Plan Goals: Short Term Goals: 4 weeks  Patient will be able to tolerate initial exercises  Patient will have pain <5/10  Patient will be able to sleep without pain interruption  Patient will be able to walk with a normal gait pattern     Long Term Goals: 8 weeks  Patient will be independent in performing home exercise program.  Patient will have functional pain free knee AROM  Patient will be able to climb up/down inclines and stairs without increased symptoms  Patient will be able to squat lift 30# without increased symptoms     Plan  Therapy  options: will be seen for skilled therapy services  Planned modality interventions: TENS, cryotherapy and electrical stimulation/Russian stimulation  Planned therapy interventions: manual therapy, soft tissue mobilization, spinal/joint mobilization, strengthening, stretching, therapeutic activities, home exercise program, balance/weight-bearing training, neuromuscular re-education and joint mobilization  Frequency: 3x week  Duration in visits: 18  Duration in weeks: 6  Treatment plan discussed with: patient  Plan details: Reviewed current HEP and made modifications to her SLR and hip abduction exercises        Manual Therapy:    15     mins  86260;  Therapeutic Exercise:    25     mins  55881;     Neuromuscular Micheal:    0    mins  28352;    Therapeutic Activity:     0     mins  66470;     Ultrasound                  __0_  mins  65250  Iontophoresis                 00    mins  61347    Electrical Stimulation     0    mins  85540 ([id number])  Traction                         _0  mins  96124     Evaluation Time:     20  mins  Timed Treatment:   40   mins   Total Treatment:     65   mins    PT: Araceli Alexis, PT     License Number: [place] [id number]  Electronically signed by Araceli Alexis PT, 05/23/22, 4:52 PM EDT    Certification Period: 5/23/2022 thru 8/20/2022  I certify that the therapy services are furnished while this patient is under my care.  The services outlined above are required by this patient, and will be reviewed every 90 days.         Physician Signature:__________________________________________________    PHYSICIAN: Bakari Duran II, MD      DATE:     Please sign and return via fax to [email or web address] . Thank you, [facility].    PT SIGNATURE: Araceli Alexis PT   [place] LICENSE:  [id number]

## 2022-05-23 NOTE — TELEPHONE ENCOUNTER
Spoke with Ms. Hendrix to see how she is doing as she is 2 weeks SP RTK. She was getting ready to head out to OP PT for her first visit at the time of this call. She said she s doing ok. She does have quite a bit of pain and swelling but it is slowly getting better. The incision looks good. She hopes to get the zipline removed today. She said she had a rough few days as she slipped in the tub and tweaked her leg a little. It is getting better though and she is able to complete her exercises. She had some bleeding initially, that has since resolved. She denies drainage, soa, fever, incision is not hot to touch either. She is working her bend. She was at 106 when she completed HH and almost at a straight leg as well. Ms. Hendrix is happy with her progress so far. She doesn't have any questions for me a this time. She does have my contact information should she need anything. She voiced understanding.

## 2022-05-25 ENCOUNTER — TREATMENT (OUTPATIENT)
Dept: PHYSICAL THERAPY | Facility: CLINIC | Age: 57
End: 2022-05-25

## 2022-05-25 DIAGNOSIS — M25.561 ACUTE PAIN OF RIGHT KNEE: ICD-10-CM

## 2022-05-25 DIAGNOSIS — Z74.09 IMPAIRED FUNCTIONAL MOBILITY, BALANCE, GAIT, AND ENDURANCE: ICD-10-CM

## 2022-05-25 DIAGNOSIS — Z96.651 S/P TKR (TOTAL KNEE REPLACEMENT), RIGHT: Primary | ICD-10-CM

## 2022-05-25 PROCEDURE — 97140 MANUAL THERAPY 1/> REGIONS: CPT | Performed by: PHYSICAL THERAPIST

## 2022-05-25 PROCEDURE — 97110 THERAPEUTIC EXERCISES: CPT | Performed by: PHYSICAL THERAPIST

## 2022-05-25 NOTE — PROGRESS NOTES
Physical Therapy Daily Progress Note    Patient: Kalpana Hendrix   : 1965  Referring practitioner: Bakari Duran*  Today's Date: 2022  Patient seen for 2 sessions    Visit Diagnoses:    ICD-10-CM ICD-9-CM   1. S/P TKR (total knee replacement), right  Z96.651 V43.65   2. Acute pain of right knee  M25.561 719.46   3. Impaired functional mobility, balance, gait, and endurance  Z74.09 V49.89       Subjective   Kalpana Hendrix reports: that she has been very sore since her last appt.  Has not been able to do her HEP due to the pain.  States that she took her percocet and ibuprofen today.        Objective   Presents with swollen right knee, stiff knee gait pattern    Tenderness to palpation circa patella and medial joint line    See Exercise, Manual, and Modality Logs for complete treatment.     Patient Education: need for improved flexion    Assessment/Plan  More pain than anticipated after last session.  Was not able to tolerate even gravity assisted flexion today due to pain.  Did well with prone knee bend. 2 weeks post TKR.    Progress strengthening /stabilization /functional activity           Timed:  Manual Therapy:    15     mins  93982;  Therapeutic Exercise:    25/35     mins  32814;     Neuromuscular Micheal:    0    mins  23991;    Therapeutic Activity:     0     mins  98438;     Ultrasound:      0     mins  63932;    Iontophoresis              __0_   mins  Dry Needling               _____   mins        Untimed:  Electrical Stimulation:     0    mins  42641 ( );  Mechanical Traction:             mins  47534;   Paraffin                       _____  mins     Timed Treatment:   40   mins   Total Treatment:     60   mins    Araceli Alexis PT  [place] License # [id number]  Physical Therapist    Electronically signed by Araceli Alexis PT, 22, 3:37 PM EDT

## 2022-05-26 NOTE — PROGRESS NOTES
Physical Therapy Daily Progress Note    Visit Diagnoses:    ICD-10-CM ICD-9-CM   1. S/P TKR (total knee replacement), right  Z96.651 V43.65   2. Impaired functional mobility, balance, gait, and endurance  Z74.09 V49.89       VISIT#: 3      Kalpana Hendrix reports: He r R knee is a little tender today. She takes ibuprofen during the day and percocet at night. A menthol gel seems to help her get through her exercises  Current Pain Level:    4/10; Worst:   6-7/10; Best:  4/10  Location Of Pain: anterior medial knee, some posterior knee pain, swelling anteiror knee and lower leg  Quality of Pain: throbbing, dull ache, knife-like, pressure, sharp, discomfort and tight  Response to Previous Session: Good. No issues  Functional Deficits/Irritating Factors: squatting, standing, movement, stairs and prolonged positioning  Progression: Improving  Compliance with HEP Reported: Yes    Objective  Presents: Post op dressing, swelling in the right knee and lower leg  Walks with decreased stance phase, push off and knee flexion on the right  Swelling present from knee to ankle  Increased sets/reps of:  Increased time on SciFit, SLR, SL Hip Abduction   Increased resistance on:  none  Added to Program: SLS    AROM R knee flexion = 107 deg; Extension = 0 deg  PROM R knee flexion = 110 deg    See Exercise, Manual, and Modality Logs for complete treatment.     Patient Education: Pt was educated on exercise biomechanical correctness, intensity, and speed.     Assessment:  Pt making good progress. Her gait pattern is improving, her range of motion improved and her tolerance to functional mobility is increasing. Pt able to increase volume of exercises today. Balance is fair - pt unable to maintain balance for less than 10 sec without UE assist.  Pt will continue to benefit from skilled PT interventions to address current functional deficits and impairments.       Plan: Progress to/Continue with current program.         Manual Therapy:     15     mins  65883;  Ultrasound:   0 mins 14185  Electrical Stimulation: __0____ mins 84344/  Iontophoresis: 0 mins 71103  Traction: 0 mins  99666  Work Conditionin mins 08332  Therapeutic Exercise:    15/45     mins  22911;     Neuromuscular Micheal:    0    mins  07923;    Therapeutic Activity:     0     mins  53259;     Timed Treatment:   30   mins   Total Treatment:     60   mins    LAKISHA Johnston License # [id number]  Physical Therapist Assistant

## 2022-05-27 ENCOUNTER — TREATMENT (OUTPATIENT)
Dept: PHYSICAL THERAPY | Facility: CLINIC | Age: 57
End: 2022-05-27

## 2022-05-27 DIAGNOSIS — Z96.651 S/P TKR (TOTAL KNEE REPLACEMENT), RIGHT: Primary | ICD-10-CM

## 2022-05-27 DIAGNOSIS — Z74.09 IMPAIRED FUNCTIONAL MOBILITY, BALANCE, GAIT, AND ENDURANCE: ICD-10-CM

## 2022-05-27 PROCEDURE — 97140 MANUAL THERAPY 1/> REGIONS: CPT | Performed by: PHYSICAL THERAPIST

## 2022-05-27 PROCEDURE — 97110 THERAPEUTIC EXERCISES: CPT | Performed by: PHYSICAL THERAPIST

## 2022-06-01 ENCOUNTER — TREATMENT (OUTPATIENT)
Dept: PHYSICAL THERAPY | Facility: CLINIC | Age: 57
End: 2022-06-01

## 2022-06-01 DIAGNOSIS — Z74.09 IMPAIRED FUNCTIONAL MOBILITY, BALANCE, GAIT, AND ENDURANCE: ICD-10-CM

## 2022-06-01 DIAGNOSIS — Z96.651 S/P TKR (TOTAL KNEE REPLACEMENT), RIGHT: Primary | ICD-10-CM

## 2022-06-01 DIAGNOSIS — M25.561 ACUTE PAIN OF RIGHT KNEE: ICD-10-CM

## 2022-06-01 PROCEDURE — 97110 THERAPEUTIC EXERCISES: CPT | Performed by: PHYSICAL THERAPIST

## 2022-06-01 PROCEDURE — 97140 MANUAL THERAPY 1/> REGIONS: CPT | Performed by: PHYSICAL THERAPIST

## 2022-06-01 PROCEDURE — 97530 THERAPEUTIC ACTIVITIES: CPT | Performed by: PHYSICAL THERAPIST

## 2022-06-01 NOTE — PROGRESS NOTES
Physical Therapy Daily Progress Note    Patient: Kalpana Hendrix   : 1965  Referring practitioner: Bakari Duran*  Today's Date: 2022  Patient seen for 4 sessions    Visit Diagnoses:    ICD-10-CM ICD-9-CM   1. S/P TKR (total knee replacement), right  Z96.651 V43.65   2. Impaired functional mobility, balance, gait, and endurance  Z74.09 V49.89   3. Acute pain of right knee  M25.561 719.46       Subjective   Kalpana Hendrix reports: that she saw Dr Duran today and he was very pleased with her progress.  He removed her sutures and has allowed her to start driving.  Pain tends to increase with activity.  Knee still feels very tight and aches.  Has been walking more often at home.      Objective   Presents walking with a nearly normal gait pattern - moderate swelling anterior knee    Heel slide after manual therapy - 0-117*     See Exercise, Manual, and Modality Logs for complete treatment.     Patient Education: ice after activity, cont HEP    Assessment/Plan  Kalpana continues making progress in both her motion and her functional levels.  She has persistent swelling in the anterior knee.  Able to increase exercise today without increased symptoms.    Progress strengthening /stabilization /functional activity           Timed:  Manual Therapy:    14     mins  06404;  Therapeutic Exercise:    20/30     mins  71675;     Neuromuscular Micheal:    0    mins  63111;    Therapeutic Activity:     10     mins  57265;     Ultrasound:      0     mins  49561;    Iontophoresis              __0_   mins  Dry Needling               _____   mins        Untimed:  Electrical Stimulation:     0    mins  57640 ( );  Mechanical Traction:             mins  78127;   Paraffin                       _____  mins     Timed Treatment:   44   mins   Total Treatment:     75   mins    Araceli Alexis PT  [place] License # [id number]  Physical Therapist    Electronically signed by Araceli Alexis PT, 22, 3:21 PM EDT

## 2022-06-02 NOTE — PROGRESS NOTES
Physical Therapy Daily Progress Note    Visit Diagnoses:    ICD-10-CM ICD-9-CM   1. S/P TKR (total knee replacement), right  Z96.651 V43.65   2. Impaired functional mobility, balance, gait, and endurance  Z74.09 V49.89       VISIT#: 5      Kalpana Hendrix reports: for the most part her knee is doing really well but it is tender today. She has been sick to her stomach and was on her knees with emesis in front of the commode. She saw her referring provider on Wednesday. Stated he worked her knee over. She was  released to drive. He said everything looked really, really good but she will probably have swelling for another few months. She stated her balance is off and how she ended up in this mess.   Current Pain Level:    4/10; Worst:   5-6/10; Best:  0/10  Location Of Pain: R knee  Quality of Pain: achy  Response to Previous Session: Good. No issues  Functional Deficits/Irritating Factors: squatting, standing, stairs and prolonged positioning  Progression: Improving overall.   Compliance with HEP Reported: Yes    Objective  Presents: Normal gait pattern, Edema present  Increased sets/reps of:  SLR, HS curls   Increased resistance on:  SLR, SL Hip abduction  Added to Program: Rocker board    AROM R knee flexion = 111 deg; Extension = 0 deg      See Exercise, Manual, and Modality Logs for complete treatment.     Patient Education: Pt was educated on exercise biomechanical correctness, intensity, and speed.     Assessment:  Pt with some increased knee swelling today secondary to being sick and on her knees a lot. Did not achieve as much knee flexion range of motion as last session but extension is staying at 0 deg.  Pt with some scissoring during reverse TM but improved with cuing. Avoided increasing any knee flexion resistance. Pt performed standing knee flexion today since she informed PTA she did it that way last session.   Pt will continue to benefit from skilled PT interventions to address current functional  deficits and impairments.       Plan: Progress to/Continue with current program.         Manual Therapy:    10     mins  56736;  Ultrasound:   0 mins 60481  Electrical Stimulation: __0____ mins 79645/  Iontophoresis: 0 mins 10156  Traction: 0 mins  56834  Work Conditionin mins 69945  Therapeutic Exercise:   45/ 55     mins  17890;     Neuromuscular Micheal:    0    mins  56882;    Therapeutic Activity:     0     mins  49805;     Timed Treatment:   55   mins   Total Treatment:     65   mins    LAKISHA Johnston License # [id number]  Physical Therapist Assistant

## 2022-06-03 ENCOUNTER — TREATMENT (OUTPATIENT)
Dept: PHYSICAL THERAPY | Facility: CLINIC | Age: 57
End: 2022-06-03

## 2022-06-03 DIAGNOSIS — Z74.09 IMPAIRED FUNCTIONAL MOBILITY, BALANCE, GAIT, AND ENDURANCE: ICD-10-CM

## 2022-06-03 DIAGNOSIS — Z96.651 S/P TKR (TOTAL KNEE REPLACEMENT), RIGHT: Primary | ICD-10-CM

## 2022-06-03 PROCEDURE — 97140 MANUAL THERAPY 1/> REGIONS: CPT | Performed by: PHYSICAL THERAPIST

## 2022-06-03 PROCEDURE — 97110 THERAPEUTIC EXERCISES: CPT | Performed by: PHYSICAL THERAPIST

## 2022-06-08 ENCOUNTER — TREATMENT (OUTPATIENT)
Dept: PHYSICAL THERAPY | Facility: CLINIC | Age: 57
End: 2022-06-08

## 2022-06-08 DIAGNOSIS — M25.561 ACUTE PAIN OF RIGHT KNEE: ICD-10-CM

## 2022-06-08 DIAGNOSIS — Z74.09 IMPAIRED FUNCTIONAL MOBILITY, BALANCE, GAIT, AND ENDURANCE: ICD-10-CM

## 2022-06-08 DIAGNOSIS — Z96.651 S/P TKR (TOTAL KNEE REPLACEMENT), RIGHT: Primary | ICD-10-CM

## 2022-06-08 PROCEDURE — 97140 MANUAL THERAPY 1/> REGIONS: CPT | Performed by: PHYSICAL THERAPIST

## 2022-06-08 PROCEDURE — 97110 THERAPEUTIC EXERCISES: CPT | Performed by: PHYSICAL THERAPIST

## 2022-06-08 NOTE — PROGRESS NOTES
Physical Therapy Daily Progress Note    Patient: Kalpana Hendrix   : 1965  Referring practitioner: Bakari Duran*  Today's Date: 2022  Patient seen for 6 sessions    Visit Diagnoses:    ICD-10-CM ICD-9-CM   1. S/P TKR (total knee replacement), right  Z96.651 V43.65   2. Impaired functional mobility, balance, gait, and endurance  Z74.09 V49.89   3. Acute pain of right knee  M25.561 719.46       Subjective   Kalpana Hendrix reports: that she took a bad step last weekend and had significant increased pain in the right knee.  Has had increaesd pain since then.  Did go see ortho and had an xray which revealed no fracture or loose components but may have torn a ligament.  Will see ortho 22 fro reassessment and determination of whether additional imaging is required.  Feels like she has taken about 10 steps back from where she was at the beginning of last week. Also complaining of left knee pain.        Objective   Presents wearing hinged knee brace on right knee with additional swelling noted    Tenderness anterior lateral joint line as well as medial hamstring tendons    Heel slide 2-102*    Circumferential measurements - joint line 44cm    See Exercise, Manual, and Modality Logs for complete treatment.     Patient Education: cont to ice, prop the leg    Assessment/Plan  Increased pain and tenderness with slight loss of motion after bad step last week.  Xrays indicate proper placement and no fracture.  PA did feel that she may have torn scar tissue or ligament.  Greatly decreased exercise output today due to pain and swelling.    Progress per Plan of Care  Resume full program as able         Timed:  Manual Therapy:    8     mins  38791;  Therapeutic Exercise:    30/40     mins  12748;     Neuromuscular Micheal:    0    mins  16042;    Therapeutic Activity:     0     mins  12559;     Ultrasound:      0     mins  95921;    Iontophoresis              __0_   mins  Dry Needling               _____    mins        Untimed:  Electrical Stimulation:     0    mins  33083 ( );  Mechanical Traction:             mins  29700;   Paraffin                       _____  mins     Timed Treatment:   38   mins   Total Treatment:     65   mins    Araceli Alexis PT  [place] License # [id number]  Physical Therapist    Electronically signed by Araclei Alexis PT, 06/08/22, 1:58 PM EDT

## 2022-06-10 ENCOUNTER — TREATMENT (OUTPATIENT)
Dept: PHYSICAL THERAPY | Facility: CLINIC | Age: 57
End: 2022-06-10

## 2022-06-10 PROCEDURE — 97110 THERAPEUTIC EXERCISES: CPT | Performed by: PHYSICAL THERAPIST

## 2022-06-10 PROCEDURE — 97140 MANUAL THERAPY 1/> REGIONS: CPT | Performed by: PHYSICAL THERAPIST

## 2022-06-10 NOTE — PROGRESS NOTES
"Physical Therapy Daily Progress Note    Visit Diagnoses:  No diagnosis found.    VISIT#: 7      Kalpana Hendrix reports: She returns to the MD on the  because she might have done something to the scar tissue or her tendons. She is icing and propping up her leg again. Her function has declined too. She is back to step to pattern on stairs.   Current Pain Level:   5- 6/10; Worst:   6-7/10; Best:  4-5/10  Location Of Pain: inferior to R patella  Quality of Pain: constant, shooting, sharp, aching  Response to Previous Session: Good. No issues  Functional Deficits/Irritating Factors: squatting, standing, stairs and prolonged positioning  Progression: has digressed since slip  Compliance with HEP Reported: Yes    Objective  Presents: Edema present  Increased sets/reps of:  none   Increased resistance on:  none  Added to Program: none    AROM R knee flexion = 110 deg; Extension = 0 deg      See Exercise, Manual, and Modality Logs for complete treatment.     Patient Education: Pt was educated on exercise biomechanical correctness, intensity, and speed.     Assessment:  Pt still experiencing increased pain and loss of function since her \"slip\" off concrete. Knee range of motion is about the same as 6/3/22 treatment so she is doing well in that area.  Pt will continue to benefit from skilled PT interventions to address current functional deficits and impairments.       Plan: Progress to/Continue with current program.         Manual Therapy:    15     mins  30911;  Ultrasound:   0 mins 96432  Electrical Stimulation: __0____ mins 50012/  Iontophoresis: 0 mins 52745  Traction: 0 mins  13261  Work Conditionin mins 55032  Therapeutic Exercise:    20/45     mins  58712;     Neuromuscular Micheal:    0    mins  36688;    Therapeutic Activity:     0     mins  34981;     Timed Treatment:   35   mins   Total Treatment:     60   mins    Katy Roach PTA  [place] License # [id number]  Physical Therapist Assistant      "

## 2022-06-16 NOTE — PROGRESS NOTES
"Physical Therapy Daily Progress Note    Visit Diagnoses:    ICD-10-CM ICD-9-CM   1. S/P TKR (total knee replacement), right  Z96.651 V43.65   2. Impaired functional mobility, balance, gait, and endurance  Z74.09 V49.89       VISIT#: 8      Kalpana Hendrix reports: Her pain is not good today and she is struggling to stand up.  Her knee has gotten worse since last session. She feels like she has gone back to week two post op. Her R hip has began to hurt like sciatica - the pain is burning and stinging an it hurts all the way down to her ankle. She has only been getting about 2 hours a night. She called her MD for pain meds even through she hates taking them. She reports she has had a hx of low back pain for years but it has not bothered her in a long time. She agrees her hip pain could be from possibly tweaking her back when she slipped. She denies any low back pain but has had sciatica in the past.   Current Pain Level:   6-7/10; Worst:   7/10; Best:  4/10  Location Of Pain: medial knee  Quality of Pain: constant, shooting, sharp, aching, stabbing  Response to Previous Session: Good. No issues  Functional Deficits/Irritating Factors: squatting, standing, stairs and prolonged sitting and walking  Progression: has digressed since slip  Compliance with HEP Reported: Yes    Objective  Presents: Edema present  Increased sets/reps of:  none   Increased resistance on:  none  Added to Program: none    KT tape to R knee- two strips on lateral knee, one strip on medial knee and one strip underneath the patella for support and to  AROM R knee flexion = 111 deg; Extension = 0 deg      See Exercise, Manual, and Modality Logs for complete treatment.     Patient Education: Pt was educated on exercise biomechanical correctness, intensity, and speed.     Assessment:  Pt still having increased pain and decreased tolerance to functional mobility since her \"slip\" a few weeks ago. She will be returning to her MD on the 22nd of this month " for evaluation. Pt has had low back issues in the past. If she unknowingly tweaked her back during her slip she may have affected her back. Her gait pattern is also altered which may have caused some hip pain. Exercises were kept in a mostly pain free range and wt bearing limited. Pt will continue to benefit from skilled PT interventions to address current functional deficits and impairments.       Plan: Progress to/Continue with current program.         Manual Therapy:    15     mins  45381;  Ultrasound:   0 mins 78811  Electrical Stimulation: __0____ mins 02366/  Iontophoresis: 0 mins 08052  Traction: 0 mins  43398  Work Conditionin mins 01185  Therapeutic Exercise:   30/ 55     mins  48044;     Neuromuscular Micheal:    0    mins  07114;    Therapeutic Activity:     0     mins  51173;     Timed Treatment:   30   mins   Total Treatment:     70   mins    Katy Roach PTA  [place] License # [id number]  Physical Therapist Assistant

## 2022-06-17 ENCOUNTER — TREATMENT (OUTPATIENT)
Dept: PHYSICAL THERAPY | Facility: CLINIC | Age: 57
End: 2022-06-17

## 2022-06-17 DIAGNOSIS — Z74.09 IMPAIRED FUNCTIONAL MOBILITY, BALANCE, GAIT, AND ENDURANCE: ICD-10-CM

## 2022-06-17 DIAGNOSIS — Z96.651 S/P TKR (TOTAL KNEE REPLACEMENT), RIGHT: Primary | ICD-10-CM

## 2022-06-17 PROCEDURE — 97110 THERAPEUTIC EXERCISES: CPT | Performed by: PHYSICAL THERAPIST

## 2022-06-17 PROCEDURE — 97140 MANUAL THERAPY 1/> REGIONS: CPT | Performed by: PHYSICAL THERAPIST

## 2022-06-20 ENCOUNTER — TREATMENT (OUTPATIENT)
Dept: PHYSICAL THERAPY | Facility: CLINIC | Age: 57
End: 2022-06-20

## 2022-06-20 DIAGNOSIS — Z96.651 S/P TKR (TOTAL KNEE REPLACEMENT), RIGHT: Primary | ICD-10-CM

## 2022-06-20 DIAGNOSIS — Z74.09 IMPAIRED FUNCTIONAL MOBILITY, BALANCE, GAIT, AND ENDURANCE: ICD-10-CM

## 2022-06-20 PROCEDURE — 97110 THERAPEUTIC EXERCISES: CPT | Performed by: PHYSICAL THERAPIST

## 2022-06-20 PROCEDURE — 97140 MANUAL THERAPY 1/> REGIONS: CPT | Performed by: PHYSICAL THERAPIST

## 2022-06-20 NOTE — PROGRESS NOTES
"Physical Therapy Daily Progress Note/MD    Visit Diagnoses:    ICD-10-CM ICD-9-CM   1. S/P TKR (total knee replacement), right  Z96.651 V43.65   2. Impaired functional mobility, balance, gait, and endurance  Z74.09 V49.89       VISIT#: 9      Kalpana Hendrix reports: Not much has changed since her last PT session. Her R hip is still hurting and it has began to burn. The burning in her knee has gotten better but she is having still sharp, shooting pains. Her swelling in her ankle is about gone. She is going to her MD this Wednesday at 11 am and also to the massage therapist at 8:00 am. She will see us, PT, at 2 pm. She believes she is about 50% better since her surgery.   Current Pain Level:    3/10; Worst:   4-5/10; Best:  3/10  Location Of Pain: R medial knee, some posterior and some distal to her patella  Quality of Pain: shooting, burning, achey  Response to Previous Session: good. No issues  Functional Deficits/Irritating Factors: squatting, standing, stairs and prolonged sitting and walking, sleeping  Progression: improving  Compliance with HEP Reported: Yes    Objective  Presents: Edema throughout her R knee, lateral>medial, slightly antalgic gait pattern.  Increased sets/reps of:  none   Increased resistance on:  Leg Press  Added to Program: Sidestepping and monster Walk    AROM R knee flexion = 121 deg; Extension = 0 deg  MMT R knee Flexion = 5/5 with some burning in lateral distal thigh; Extension = 5/5      See Exercise, Manual, and Modality Logs for complete treatment.     Patient Education: Pt was educated on exercise biomechanical correctness, intensity, and speed.     Assessment:  Pt is still having increased symptoms since her \"slip\" but doing better since her session last week. Her pain level has decreased and her tolerance to functional mobility has been improving but are not yet to desired levels. Range of motion and strength are very good.   Pt will continue to benefit from skilled PT " interventions to address current functional deficits and impairments.       Plan: Progress to/Continue with current program. Progress stability, balance, range of motion        Manual Therapy:    15     mins  69187;  Ultrasound:   0 mins 28695  Electrical Stimulation: __0____ mins 37853/  Iontophoresis: 0 mins 80377  Traction: 0 mins  01296  Work Conditionin mins 57602  Therapeutic Exercise:    45/60     mins  49483;     Neuromuscular Micheal:    0    mins  53181;    Therapeutic Activity:     0     mins  33816;     Timed Treatment:   60   mins   Total Treatment:     75   mins    Katy Roach PTA  [place] License # [id number]  Physical Therapist Assistant

## 2022-06-22 ENCOUNTER — TELEPHONE (OUTPATIENT)
Dept: OCCUPATIONAL THERAPY | Facility: CLINIC | Age: 57
End: 2022-06-22

## 2022-06-27 ENCOUNTER — TREATMENT (OUTPATIENT)
Dept: PHYSICAL THERAPY | Facility: CLINIC | Age: 57
End: 2022-06-27

## 2022-06-27 DIAGNOSIS — Z74.09 IMPAIRED FUNCTIONAL MOBILITY, BALANCE, GAIT, AND ENDURANCE: ICD-10-CM

## 2022-06-27 DIAGNOSIS — M25.561 ACUTE PAIN OF RIGHT KNEE: ICD-10-CM

## 2022-06-27 DIAGNOSIS — Z96.651 S/P TKR (TOTAL KNEE REPLACEMENT), RIGHT: Primary | ICD-10-CM

## 2022-06-27 PROCEDURE — 97110 THERAPEUTIC EXERCISES: CPT | Performed by: PHYSICAL THERAPIST

## 2022-06-27 PROCEDURE — 97530 THERAPEUTIC ACTIVITIES: CPT | Performed by: PHYSICAL THERAPIST

## 2022-06-27 PROCEDURE — 97140 MANUAL THERAPY 1/> REGIONS: CPT | Performed by: PHYSICAL THERAPIST

## 2022-06-27 NOTE — PROGRESS NOTES
Physical Therapy Daily Treatment Note    Patient: Kalpana Hendrix   : 1965  Referring practitioner: Bakari Duran*  Today's Date: 2022  Patient seen for 10 sessions    Visit Diagnoses:    ICD-10-CM ICD-9-CM   1. S/P TKR (total knee replacement), right  Z96.651 V43.65   2. Impaired functional mobility, balance, gait, and endurance  Z74.09 V49.89   3. Acute pain of right knee  M25.561 719.46       Subjective   Kalpana Hendrix reports: that her MD wanted her to rest the knee due to a recent sprain while taking a bad step.  States that is very frustrated as she has burning in the posterior thigh region as well as deep in the joint. Notes that she is not bending the knee as well as desired.  States that she is still having trouble sleeping.  Has had to take meds to sleep.  Pain varied form 2-6/10.       Objective   Heel slide 0-120*    Tenderness medial hamstring tendons    See Exercise, Manual, and Modality Logs for complete treatment.     Patient Education: additional hamstring stretch techniques    Assessment/Plan  Tenderness and tightness in hamstings was partially relieved with manual therapy today.  NO instability noted in the knee but does have greater pain than expected at 6 weeks post TKR for active patient.  Patient very compliant with therapy    Progress strengthening /stabilization /functional activity           Timed:  Manual Therapy:    15     mins  91784;  Therapeutic Exercise:    15/30     mins  40783;     Neuromuscular Micheal:    0    mins  76310;    Therapeutic Activity:     10     mins  93232;     Ultrasound:      0     mins  09026;    Iontophoresis              __0_   mins  Dry Needling               _____   mins        Untimed:  Electrical Stimulation:     0    mins  62713 ( );  Mechanical Traction:             mins  76761;   Paraffin                       _____  mins     Timed Treatment:  40   mins   Total Treatment:     75   mins    Araceli Alexis, PT  [place] License #  [id number]  Physical Therapist    Electronically signed by Araceli Alexis, PT, 06/27/22, 2:00 PM EDT

## 2022-06-29 ENCOUNTER — TREATMENT (OUTPATIENT)
Dept: PHYSICAL THERAPY | Facility: CLINIC | Age: 57
End: 2022-06-29

## 2022-06-29 DIAGNOSIS — Z74.09 IMPAIRED FUNCTIONAL MOBILITY, BALANCE, GAIT, AND ENDURANCE: ICD-10-CM

## 2022-06-29 DIAGNOSIS — Z96.651 S/P TKR (TOTAL KNEE REPLACEMENT), RIGHT: Primary | ICD-10-CM

## 2022-06-29 DIAGNOSIS — M25.561 ACUTE PAIN OF RIGHT KNEE: ICD-10-CM

## 2022-06-29 PROCEDURE — 97140 MANUAL THERAPY 1/> REGIONS: CPT | Performed by: PHYSICAL THERAPIST

## 2022-06-29 PROCEDURE — 97110 THERAPEUTIC EXERCISES: CPT | Performed by: PHYSICAL THERAPIST

## 2022-06-29 PROCEDURE — 97530 THERAPEUTIC ACTIVITIES: CPT | Performed by: PHYSICAL THERAPIST

## 2022-06-29 NOTE — PROGRESS NOTES
Physical Therapy Daily Treatment Note    Patient: Kalpana Hendrix   : 1965  Referring practitioner: Bakari Duran*  Today's Date: 2022  Patient seen for 11 sessions    Visit Diagnoses:    ICD-10-CM ICD-9-CM   1. S/P TKR (total knee replacement), right  Z96.651 V43.65   2. Impaired functional mobility, balance, gait, and endurance  Z74.09 V49.89   3. Acute pain of right knee  M25.561 719.46       Subjective   Kalpana Hendrix reports: that she is still having the medial and lateral hamstring pain.  C/o also right hip flexor strain.  Still has some burning in anterior knee.      Objective   Tenderness and tightness in right iliopsoas    Tenderness in distal hamstring tendons    Heel slide - 0-121* after manual therapy     See Exercise, Manual, and Modality Logs for complete treatment.     Patient Education:new hip flexor stretch exercise.  [place]    Assessment/Plan  Still demonstrating good motion despite complaints of additional pain in the knee.  Much of the hamstring discomfort is eased with manual therapy.  Persistent swelling anterior knee.    Progress strengthening /stabilization /functional activity           Timed:  Manual Therapy:    25     mins  34718;  Therapeutic Exercise:    15/30     mins  25712;     Neuromuscular Micheal:    0    mins  55523;    Therapeutic Activity:     10     mins  13165;     Ultrasound:      0     mins  05076;    Iontophoresis              __0_   mins  Dry Needling               _____   mins        Untimed:  Electrical Stimulation:     0    mins  60420 ( );  Mechanical Traction:             mins  15577;   Paraffin                       _____  mins     Timed Treatment:   50   mins   Total Treatment:     70   mins    Araceli Alexis PT  [place] License # [id number]  Physical Therapist    Electronically signed by Araceli Alexis PT, 22, 9:01 AM EDT

## 2022-07-06 ENCOUNTER — TREATMENT (OUTPATIENT)
Dept: PHYSICAL THERAPY | Facility: CLINIC | Age: 57
End: 2022-07-06

## 2022-07-06 DIAGNOSIS — M25.561 ACUTE PAIN OF RIGHT KNEE: ICD-10-CM

## 2022-07-06 DIAGNOSIS — Z74.09 IMPAIRED FUNCTIONAL MOBILITY, BALANCE, GAIT, AND ENDURANCE: ICD-10-CM

## 2022-07-06 DIAGNOSIS — Z96.651 S/P TKR (TOTAL KNEE REPLACEMENT), RIGHT: Primary | ICD-10-CM

## 2022-07-06 PROCEDURE — 97140 MANUAL THERAPY 1/> REGIONS: CPT | Performed by: PHYSICAL THERAPIST

## 2022-07-06 PROCEDURE — 97530 THERAPEUTIC ACTIVITIES: CPT | Performed by: PHYSICAL THERAPIST

## 2022-07-06 PROCEDURE — 97110 THERAPEUTIC EXERCISES: CPT | Performed by: PHYSICAL THERAPIST

## 2022-07-06 NOTE — PROGRESS NOTES
Physical Therapy Daily Treatment Note    Patient: Kalpana Hendrix   : 1965  Referring practitioner: Bakari Duran*  Today's Date: 2022  Patient seen for 12 sessions    Visit Diagnoses:    ICD-10-CM ICD-9-CM   1. S/P TKR (total knee replacement), right  Z96.651 V43.65   2. Impaired functional mobility, balance, gait, and endurance  Z74.09 V49.89   3. Acute pain of right knee  M25.561 719.46       Subjective   Kalpana Hendrix reports: that she is still having quite a bit of pain in the right hamstring muscles and some lateral hip pain.  [place].  Did have some popping in the right hip over the weekend and now has some occasional medial hip pain.       Objective   Presents with slightly antalgic gait pattern with minimally decreased stance phase on the right    Tenderness lateral hamstring tendon    Heel slide after manual therapy 0-125*    See Exercise, Manual, and Modality Logs for complete treatment.     Patient Education:    Assessment/Plan  Soreness in posterior lateral thigh, lateral hamstrings.  Knee motion and stability are good.  Gait is altered by hip and knee pain today.  Relief of hamstring soreness with manual therapy.    Did inhibitory KTape to lateral hamstring today     Progress strengthening /stabilization /functional activity  Consider dry needling of hip mm next visit if still symptomstic         Timed:  Manual Therapy:    15     mins  68199;  Therapeutic Exercise:    25/35     mins  14294;     Neuromuscular Micheal:    0    mins  43241;    Therapeutic Activity:     10     mins  54214;     Ultrasound:      0     mins  21248;    Iontophoresis              __0_   mins  Dry Needling               _____   mins        Untimed:  Electrical Stimulation:     0    mins  47334 ( );  Mechanical Traction:             mins  00564;   Paraffin                       _____  mins     Timed Treatment:   50   mins   Total Treatment:     70    mins    Araceli Alexis, PT  [place] License # [id number]  Physical Therapist    Electronically signed by Araceli Alexis, PT, 07/06/22, 1:57 PM EDT

## 2022-07-07 ENCOUNTER — TREATMENT (OUTPATIENT)
Dept: PHYSICAL THERAPY | Facility: CLINIC | Age: 57
End: 2022-07-07

## 2022-07-07 DIAGNOSIS — Z74.09 IMPAIRED FUNCTIONAL MOBILITY, BALANCE, GAIT, AND ENDURANCE: ICD-10-CM

## 2022-07-07 DIAGNOSIS — Z96.651 S/P TKR (TOTAL KNEE REPLACEMENT), RIGHT: Primary | ICD-10-CM

## 2022-07-07 DIAGNOSIS — M25.561 ACUTE PAIN OF RIGHT KNEE: ICD-10-CM

## 2022-07-07 PROCEDURE — 97140 MANUAL THERAPY 1/> REGIONS: CPT | Performed by: PHYSICAL THERAPIST

## 2022-07-07 PROCEDURE — 97110 THERAPEUTIC EXERCISES: CPT | Performed by: PHYSICAL THERAPIST

## 2022-07-07 PROCEDURE — 97112 NEUROMUSCULAR REEDUCATION: CPT | Performed by: PHYSICAL THERAPIST

## 2022-07-07 PROCEDURE — 97530 THERAPEUTIC ACTIVITIES: CPT | Performed by: PHYSICAL THERAPIST

## 2022-07-07 NOTE — PROGRESS NOTES
Physical Therapy Daily Treatment Note    Patient: Kalpana Hendrix   : 1965  Referring practitioner: Bakari Duran*  Today's Date: 2022  Patient seen for 13 sessions    Visit Diagnoses:    ICD-10-CM ICD-9-CM   1. S/P TKR (total knee replacement), right  Z96.651 V43.65   2. Impaired functional mobility, balance, gait, and endurance  Z74.09 V49.89   3. Acute pain of right knee  M25.561 719.46       Subjective   Kalpana Hendrix reports: that the posteror knee pain persists especially when turning over in bed.  States that she is limping more because of hte posteriuor pain then the anterior pain that was present prior to her misstep. States that she wants to try Dry Needling on the hamstrings.      Objective   Tenderness right lateral hamstring muscle and tendon    Minimally antalgic gait on the right with decreased push off noted    See Exercise, Manual, and Modality Logs for complete treatment.     Patient Education: Dry Needling education, verified that she had no contraindications for Needling.  Signed consent form     Assessment/Plan  Kalpana has excellent motion in the knee but is having some soft tissue pain in the biceps femoris muscle and tendon area.  Tenderness in ITB as well.  Moderate relief of symptoms with Dry Needling trial today    Progress strengthening /stabilization /functional activity           Timed:  Manual Therapy:    10     mins  93515;  Therapeutic Exercise:    10/30     mins  64191;     Neuromuscular Micheal:    10    mins  99065;    Therapeutic Activity:     10     mins  56902;     Ultrasound:      0     mins  46747;    Iontophoresis              __0_   mins  Dry Needling               ___5__   mins        Untimed:  Electrical Stimulation:     0    mins  52059 ( );  Mechanical Traction:             mins  29971;   Paraffin                       _____  mins     Timed Treatment:   40   mins   Total Treatment:     70   mins    Araceli Alexis, PT  [place] License #  [id number]  Physical Therapist    Electronically signed by Araceli Alexis, PT, 07/07/22, 2:35 PM EDT

## 2022-07-10 NOTE — PROGRESS NOTES
Physical Therapy Daily Progress Note    Visit Diagnoses:    ICD-10-CM ICD-9-CM   1. S/P TKR (total knee replacement), right  Z96.651 V43.65   2. Impaired functional mobility, balance, gait, and endurance  Z74.09 V49.89       VISIT#: 14      Kalpana Hendrix reports: She feels like she can't get over the hump since she twisted her knee. The dry needling helped for about 2 days. The pain is still in her posterolateral knee.Her R hip and knee are popping like crazy.   Current Pain Level:    4-5/10; Worst:   4-6/10; Best:  2-3/10  Location Of Pain: anterior,posterior R knee, lateral HS  Quality of Pain: achy, some sharpness  Response to Previous Session: Good. No issues  Functional Deficits/Irritating Factors: Gait  Progression: improved overall but not since last session.  Compliance with HEP Reported: not sure    Objective  Presents: Minimally antalgic gait on the right with decreased push off noted. Edema present  Increased sets/reps of:  none   Increased resistance on:  none  Added to Program: IT band stretch, 3 way SLS    MMT R hip abduction = 4/5      See Exercise, Manual, and Modality Logs for complete treatment.     Patient Education: Pt was educated on exercise biomechanical correctness, intensity, and speed.     Assessment:  Pt continues with lingering pain and popping in her posterolateral R knee. Additionally, her R hip is also having pain and popping. She was able to complete all of her exercises but was sore post session. Pt will continue to benefit from skilled PT interventions to address current functional deficits and impairments.       Plan: Progress to/Continue with current program.         Manual Therapy:    15     mins  27841;  Ultrasound:   0 mins 89805  Electrical Stimulation: __0____ mins 39377/  Iontophoresis: 0 mins 76712  Traction: 0 mins  50536  Work Conditionin mins 84860  Therapeutic Exercise:    15/     mins  83851;     Neuromuscular Micheal:    0    mins  13177;    Therapeutic  Activity:     0     mins  97700;     Timed Treatment:   30   mins   Total Treatment:     90   mins    Katy Roach, LAKISHA  [place] License # [id number]  Physical Therapist Assistant

## 2022-07-11 ENCOUNTER — TREATMENT (OUTPATIENT)
Dept: PHYSICAL THERAPY | Facility: CLINIC | Age: 57
End: 2022-07-11

## 2022-07-11 DIAGNOSIS — Z96.651 S/P TKR (TOTAL KNEE REPLACEMENT), RIGHT: Primary | ICD-10-CM

## 2022-07-11 DIAGNOSIS — Z74.09 IMPAIRED FUNCTIONAL MOBILITY, BALANCE, GAIT, AND ENDURANCE: ICD-10-CM

## 2022-07-11 PROCEDURE — 97140 MANUAL THERAPY 1/> REGIONS: CPT | Performed by: PHYSICAL THERAPIST

## 2022-07-11 PROCEDURE — 97110 THERAPEUTIC EXERCISES: CPT | Performed by: PHYSICAL THERAPIST

## 2022-07-12 ENCOUNTER — PROCEDURE VISIT (OUTPATIENT)
Dept: OBSTETRICS AND GYNECOLOGY | Facility: CLINIC | Age: 57
End: 2022-07-12

## 2022-07-12 ENCOUNTER — OFFICE VISIT (OUTPATIENT)
Dept: OBSTETRICS AND GYNECOLOGY | Facility: CLINIC | Age: 57
End: 2022-07-12

## 2022-07-12 ENCOUNTER — TELEPHONE (OUTPATIENT)
Dept: INTERNAL MEDICINE | Facility: CLINIC | Age: 57
End: 2022-07-12

## 2022-07-12 ENCOUNTER — APPOINTMENT (OUTPATIENT)
Dept: WOMENS IMAGING | Facility: HOSPITAL | Age: 57
End: 2022-07-12

## 2022-07-12 VITALS
WEIGHT: 226 LBS | DIASTOLIC BLOOD PRESSURE: 70 MMHG | SYSTOLIC BLOOD PRESSURE: 120 MMHG | HEIGHT: 70 IN | BODY MASS INDEX: 32.35 KG/M2

## 2022-07-12 DIAGNOSIS — Z12.31 VISIT FOR SCREENING MAMMOGRAM: Primary | ICD-10-CM

## 2022-07-12 DIAGNOSIS — R23.2 HOT FLASHES: ICD-10-CM

## 2022-07-12 DIAGNOSIS — Z12.11 COLON CANCER SCREENING: ICD-10-CM

## 2022-07-12 DIAGNOSIS — Z00.00 ANNUAL PHYSICAL EXAM: Primary | ICD-10-CM

## 2022-07-12 DIAGNOSIS — R68.82 DECREASED LIBIDO: ICD-10-CM

## 2022-07-12 DIAGNOSIS — F41.9 ANXIETY: ICD-10-CM

## 2022-07-12 PROCEDURE — 77063 BREAST TOMOSYNTHESIS BI: CPT | Performed by: OBSTETRICS & GYNECOLOGY

## 2022-07-12 PROCEDURE — 99396 PREV VISIT EST AGE 40-64: CPT | Performed by: OBSTETRICS & GYNECOLOGY

## 2022-07-12 PROCEDURE — 99212 OFFICE O/P EST SF 10 MIN: CPT | Performed by: OBSTETRICS & GYNECOLOGY

## 2022-07-12 PROCEDURE — 77067 SCR MAMMO BI INCL CAD: CPT | Performed by: OBSTETRICS & GYNECOLOGY

## 2022-07-12 NOTE — TELEPHONE ENCOUNTER
Caller: Kalpana Hendrix    Relationship: Self    Best call back number: [phone]     What orders are you requesting (i.e. lab or imaging): ROUTINE LABS TO CHECK LEVELS    In what timeframe would the patient need to come in: BEFORE HER APPOINTMENT ON 07/19/2022 (IF HER PCP APPROVES)    Where will you receive your lab/imaging services: IN-OFFICE

## 2022-07-12 NOTE — PROGRESS NOTES
YFN Hendrix  is a 57 y.o. female who presents for several reasons.  First, she would like to have a routine gynecologic exam.  Overall, she is feeling well.  Bowels and bladder are functioning normally.  Mammogram was performed today.  The patient has never undergone colonoscopy.  Next, the patient has noticed decreased libido over the last several years.  She reports that sex is satisfying when it occurs, but she rarely has a desire for it.  The patient is under significant stress.  There are a lot of medical issues for she and her .  This has caused anxiety.  The patient does not have suicidal or homicidal ideation.  Next, the patient continues to have hot flashes and night sweats.  She treated this 2 years ago with hormone replacement.  She tried estrogen plus progesterone for 2 months with no change in symptoms at all.  She has not used hormones since then.    Chief Complaint   Patient presents with   • Gynecologic Exam     Patient is here for a annual and sex drive.       Past Medical History:   Diagnosis Date   • Anxiety    • Arthritis    • Asthma     MILD   • History of COVID-19 07/2021   • Hyperlipidemia    • Injury of back     Multiple back injuries - no surgeries   • Knee pain, right    • Nodule of right lung    • Right knee injury    • Subclavian artery stenosis (HCC)        Past Surgical History:   Procedure Laterality Date   • TOTAL KNEE ARTHROPLASTY Right 5/9/2022    Procedure: TOTAL KNEE ARTHROPLASTY WITH CORI ROBOT;  Surgeon: Bakari Duran II, MD;  Location: [facility];  Service: Robotics - Ortho;  Laterality: Right;   • TUBAL ABDOMINAL LIGATION     • WISDOM TOOTH EXTRACTION         Social History     Socioeconomic History   • Marital status:    • Number of children: 2   Tobacco Use   • Smoking status: Current Some Day Smoker     Types: Cigarettes   • Smokeless tobacco: Never Used   • Tobacco comment: RANDOM SMOKER , VARIES WITH ANXIETY   Vaping Use   • Vaping  Use: Never used   Substance and Sexual Activity   • Alcohol use: Yes     Alcohol/week: 7.0 standard drinks     Types: 7 Shots of liquor per week     Comment: Socially   • Drug use: No   • Sexual activity: Defer     Partners: Male     Birth control/protection: Surgical       The following portions of the patient's history were reviewed and updated as appropriate: allergies, current medications, past family history, past medical history, past social history, past surgical history and problem list.    Review of Systems      This is positive for hot flashes and night sweats.  It is positive for mood swings.  It is positive for anxiety and stress.  It is negative for homicidal or suicidal ideation.  It is positive for knee pain.  All other systems are reviewed and are negative.    Physical Exam  Vitals and nursing note reviewed.   Constitutional:       Appearance: She is well-developed.   HENT:      Head: Normocephalic and atraumatic.   Cardiovascular:      Rate and Rhythm: Normal rate and regular rhythm.   Pulmonary:      Effort: Pulmonary effort is normal.      Breath sounds: Normal breath sounds. No wheezing or rales.   Chest:      Comments: The breasts are homogeneous.  There are no palpable lumps.  Nipple discharge and axillary adenopathy are absent.  Abdominal:      General: There is no distension.      Palpations: Abdomen is soft.      Tenderness: There is no abdominal tenderness.   Genitourinary:     Labia:         Right: No lesion.         Left: No lesion.       Vagina: Normal. No vaginal discharge.      Cervix: No cervical motion tenderness.      Uterus: Normal. Not enlarged and not tender.       Adnexa:         Right: No mass or tenderness.          Left: No mass or tenderness.     Skin:     General: Skin is warm and dry.   Neurological:      Mental Status: She is alert and oriented to person, place, and time.         Assessment    Diagnoses and all orders for this visit:    1. Annual physical exam  (Primary)    2. Anxiety    3. Decreased libido    4. Hot flashes    5. Colon cancer screening  -     Cologuard - Stool, Per Rectum; Future        Plan  1. Annual examination performed  2. Counseled regarding the importance of regular screening mammograms.  Mammogram was performed today.  3. Counseled regarding the importance of colon cancer screening.  I answered the patient's questions.  She does not wish to pursue a colonoscopy, but will consider Cologuard.  The benefits and risks of this were discussed and the patient's questions were answered.  She would like to proceed.  The test has been ordered.  4. Decreased libido.  Counseled and questions answered.  We discussed the influence that postoperative pain and anxiety have on libido.  We discussed options for management of this.  The patient is working with her orthopedist regarding her postoperative pain.  We discussed the possibility of counseling for anxiety.  For now, the patient declines this.  She does plan to discuss it further with her primary care physician.  5. Hot flashes and night sweats.  Counseled and questions answered.  This did not respond to hormone replacement.  I am concerned about possible hypothyroidism.  I counseled the patient about this.  She is concerned as well.  She plans to follow-up with her primary care physician in the near future and wishes to pursue a thyroid work-up through this physician.    No follow-ups on file.    Social History     Tobacco Use   Smoking Status Current Some Day Smoker   • Types: Cigarettes   Smokeless Tobacco Never Used   Tobacco Comment    RANDOM SMOKER , VARIES WITH ANXIETY

## 2022-07-13 NOTE — TELEPHONE ENCOUNTER
Patient to arrive at office appointment fasting and will determine blood work to be performed at that time based on previous lab work in the last 3 months

## 2022-07-15 ENCOUNTER — TELEPHONE (OUTPATIENT)
Dept: OBSTETRICS AND GYNECOLOGY | Facility: CLINIC | Age: 57
End: 2022-07-15

## 2022-07-15 DIAGNOSIS — R92.1 BREAST CALCIFICATION, RIGHT: ICD-10-CM

## 2022-07-15 DIAGNOSIS — R92.8 ABNORMALITY OF RIGHT BREAST ON SCREENING MAMMOGRAM: Primary | ICD-10-CM

## 2022-07-15 NOTE — TELEPHONE ENCOUNTER
Spoke with patient and she is aware of abnormal findings on screening mammogram and is scheduled for additional images - DX RT BR MAMMO - at [facility] on 7/21/22 @ 315PM.  SR

## 2022-07-19 ENCOUNTER — LAB (OUTPATIENT)
Dept: LAB | Facility: HOSPITAL | Age: 57
End: 2022-07-19

## 2022-07-19 ENCOUNTER — OFFICE VISIT (OUTPATIENT)
Dept: INTERNAL MEDICINE | Facility: CLINIC | Age: 57
End: 2022-07-19

## 2022-07-19 VITALS
BODY MASS INDEX: 32.4 KG/M2 | OXYGEN SATURATION: 98 % | SYSTOLIC BLOOD PRESSURE: 140 MMHG | HEIGHT: 70 IN | WEIGHT: 226.3 LBS | HEART RATE: 78 BPM | DIASTOLIC BLOOD PRESSURE: 80 MMHG

## 2022-07-19 DIAGNOSIS — F41.9 ANXIETY: ICD-10-CM

## 2022-07-19 DIAGNOSIS — E78.2 MIXED HYPERLIPIDEMIA: ICD-10-CM

## 2022-07-19 DIAGNOSIS — M25.561 ACUTE PAIN OF RIGHT KNEE: Primary | ICD-10-CM

## 2022-07-19 LAB
CHOLEST SERPL-MCNC: 252 MG/DL (ref 0–200)
HDLC SERPL-MCNC: 49 MG/DL (ref 40–60)
LDLC SERPL CALC-MCNC: 144 MG/DL (ref 0–100)
LDLC/HDLC SERPL: 2.82 {RATIO}
T4 FREE SERPL-MCNC: 1.01 NG/DL (ref 0.93–1.7)
TRIGL SERPL-MCNC: 325 MG/DL (ref 0–150)
TSH SERPL DL<=0.05 MIU/L-ACNC: 2.57 UIU/ML (ref 0.27–4.2)
VLDLC SERPL-MCNC: 59 MG/DL (ref 5–40)

## 2022-07-19 PROCEDURE — 84439 ASSAY OF FREE THYROXINE: CPT | Performed by: FAMILY MEDICINE

## 2022-07-19 PROCEDURE — 84443 ASSAY THYROID STIM HORMONE: CPT | Performed by: FAMILY MEDICINE

## 2022-07-19 PROCEDURE — 80061 LIPID PANEL: CPT | Performed by: FAMILY MEDICINE

## 2022-07-19 PROCEDURE — 99214 OFFICE O/P EST MOD 30 MIN: CPT | Performed by: FAMILY MEDICINE

## 2022-07-19 PROCEDURE — 36415 COLL VENOUS BLD VENIPUNCTURE: CPT | Performed by: FAMILY MEDICINE

## 2022-07-19 RX ORDER — MELOXICAM 7.5 MG/1
7.5 TABLET ORAL DAILY
Qty: 30 TABLET | Refills: 1 | Status: SHIPPED | OUTPATIENT
Start: 2022-07-19

## 2022-07-19 NOTE — PROGRESS NOTES
"Chief Complaint  follow up to knee surgery  Hyperlipidemia obesity anxiety  Subjective        Kalpana Hendrix presents to [facility]  History of Present Illness  Patient appointment to discuss persistent right knee pain had knee replacement April postoperative course unremarkable and improving when she stepped off of a stage while dancing and stepped into a hole and seem to twist her knee and ever since she has had some more tenderness in the posterior aspect of her knee and she had follow-up with her surgeon she is also doing physical therapy.  She is presently taking anti-inflammatory intermittently  She has difficulty losing weight as well and is need of monitoring ongoing lipid management  Objective   Vital Signs:  /80 (BP Location: Right arm, Patient Position: Sitting, Cuff Size: Large Adult)   Pulse 78   Ht 177.8 cm (70\")   Wt 103 kg (226 lb 4.8 oz)   SpO2 98%   BMI 32.47 kg/m²   Estimated body mass index is 32.47 kg/m² as calculated from the following:    Height as of this encounter: 177.8 cm (70\").    Weight as of this encounter: 103 kg (226 lb 4.8 oz).          Physical Exam  Vitals and nursing note reviewed.   Constitutional:       Appearance: Normal appearance. She is obese.   Cardiovascular:      Rate and Rhythm: Normal rate.      Pulses: Normal pulses.   Pulmonary:      Effort: Pulmonary effort is normal.      Breath sounds: Normal breath sounds.   Musculoskeletal:      Right lower leg: No edema.      Left lower leg: No edema.      Comments: Slight swelling right neck compared to left   Neurological:      General: No focal deficit present.      Mental Status: She is alert.   Psychiatric:         Mood and Affect: Mood normal.         Behavior: Behavior normal.         Thought Content: Thought content normal.         Judgment: Judgment normal.        Result Review :    Common labs    Common Labsle 4/29/22 4/29/22 4/29/22    0817 0817 0817   Glucose  95    BUN  " 16    Creatinine  0.92    Sodium  140    Potassium  4.3    Chloride  105    Calcium  9.3    Albumin  4.40    Total Bilirubin  0.3    Alkaline Phosphatase  74    AST (SGOT)  26    ALT (SGPT)  32    WBC 4.62     Hemoglobin 13.5     Hematocrit 39.6     Platelets 235     Hemoglobin A1C   5.60                     Assessment and Plan   Diagnoses and all orders for this visit:    1. Acute pain of right knee (Primary)  -     meloxicam (Mobic) 7.5 MG tablet; Take 1 tablet by mouth Daily.  Dispense: 30 tablet; Refill: 1    2. Mixed hyperlipidemia  -     Lipid Panel    3. Anxiety  -     TSH  -     T4, Free    Initiate meloxicam daily for anti-inflammatory benefit  Continue physical therapy  Follow-up results of lab work otherwise as needed or      f     Follow Up   Return in about 6 months (around 1/19/2023), or if symptoms worsen or fail to improve, for Recheck.  Patient was given instructions and counseling regarding her condition or for health maintenance advice. Please see specific information pulled into the AVS if appropriate.

## 2022-07-20 ENCOUNTER — TREATMENT (OUTPATIENT)
Dept: PHYSICAL THERAPY | Facility: CLINIC | Age: 57
End: 2022-07-20

## 2022-07-20 DIAGNOSIS — Z96.651 S/P TKR (TOTAL KNEE REPLACEMENT), RIGHT: Primary | ICD-10-CM

## 2022-07-20 DIAGNOSIS — M25.561 ACUTE PAIN OF RIGHT KNEE: ICD-10-CM

## 2022-07-20 DIAGNOSIS — Z74.09 IMPAIRED FUNCTIONAL MOBILITY, BALANCE, GAIT, AND ENDURANCE: ICD-10-CM

## 2022-07-20 PROCEDURE — 97110 THERAPEUTIC EXERCISES: CPT | Performed by: PHYSICAL THERAPIST

## 2022-07-20 PROCEDURE — 97530 THERAPEUTIC ACTIVITIES: CPT | Performed by: PHYSICAL THERAPIST

## 2022-07-20 NOTE — PROGRESS NOTES
Physical Therapy Daily Treatment Note    Patient: Kalpana Hendrix   : 1965  Referring practitioner: Bakari Duran*  Today's Date: 2022  Patient seen for 15 sessions    Visit Diagnoses:    ICD-10-CM ICD-9-CM   1. S/P TKR (total knee replacement), right  Z96.651 V43.65   2. Impaired functional mobility, balance, gait, and endurance  Z74.09 V49.89   3. Acute pain of right knee  M25.561 719.46       Subjective   Kalpana Hendrix reports: that she saw her PMD who to ld her to continue taking Mobic for the RLE pain that increased after taking the bad step a few weeks ago.  States that seh is still having some right lateral hamstring burning and anterior medial knee joint line pain. States that her hip is popping more now as well.      Objective   Present with asymmetrical pelvis with right elías pelvis anteriorly rotated compared to the left    Tenderness in right knee anterior medial joint line as well as right lateral distal hamstring and right SI region    Symmetrical gait after a few steps but exhibits stiffness with first few steps    See Exercise, Manual, and Modality Logs for complete treatment.     Patient Education: use topical anti inflam gel on hamstring and anterior joint line    Assessment/Plan  Pelvis asymmetry somewhat corrected with manual therapy technique today.  Less popping in hip after treatment.  Continued to have burning in right distal lateral hamstring muscle however.  Has functional strength but pain is preventing strengthening.    Progress strengthening /stabilization /functional activity           Timed:  Manual Therapy:    10     mins  83600;  Therapeutic Exercise:    15/35     mins  75639;     Neuromuscular Micheal:    0    mins  52083;    Therapeutic Activity:     10     mins  89657;     Ultrasound:      0     mins  74168;    Iontophoresis              __0_   mins  Dry Needling               _____   mins        Untimed:  Electrical Stimulation:     0    mins  04589 (  );  Mechanical Traction:             mins  71821;   Paraffin                       _____  mins     Timed Treatment:   35   mins   Total Treatment:     75   mins    Araceli Alexis PT  [place] License # [id number]  Physical Therapist    Electronically signed by Araceli Alexis PT, 07/20/22, 5:10 PM EDT

## 2022-07-21 ENCOUNTER — APPOINTMENT (OUTPATIENT)
Dept: WOMENS IMAGING | Facility: HOSPITAL | Age: 57
End: 2022-07-21

## 2022-07-21 PROCEDURE — G0279 TOMOSYNTHESIS, MAMMO: HCPCS | Performed by: RADIOLOGY

## 2022-07-21 PROCEDURE — 77067 SCR MAMMO BI INCL CAD: CPT | Performed by: RADIOLOGY

## 2022-07-21 PROCEDURE — 77061 BREAST TOMOSYNTHESIS UNI: CPT | Performed by: RADIOLOGY

## 2022-07-21 PROCEDURE — 77065 DX MAMMO INCL CAD UNI: CPT | Performed by: RADIOLOGY

## 2022-07-21 PROCEDURE — 77063 BREAST TOMOSYNTHESIS BI: CPT | Performed by: RADIOLOGY

## 2022-07-23 LAB — NONINV COLON CA DNA+OCC BLD SCRN STL QL: NEGATIVE

## 2022-07-25 DIAGNOSIS — R92.8 ABNORMALITY OF RIGHT BREAST ON SCREENING MAMMOGRAM: ICD-10-CM

## 2022-07-25 DIAGNOSIS — R92.8 ABNORMAL MAMMOGRAM OF RIGHT BREAST: Primary | ICD-10-CM

## 2022-07-25 DIAGNOSIS — R92.1 BREAST CALCIFICATION, RIGHT: ICD-10-CM

## 2022-07-25 NOTE — TELEPHONE ENCOUNTER
Please call patient to discuss abnormal findings from her recent DX Mammo.  She is needing a biopsy.  Patient is not aware.  Referrals have been entered and report in Epic.   SR

## 2022-07-26 NOTE — TELEPHONE ENCOUNTER
Spoke with pt and she is aware of appt at [facility] 8/2/22 @ 2pm with a 130pm arrival for the Breast Biopsy.

## 2022-07-26 NOTE — TELEPHONE ENCOUNTER
Phone call.  Diagnostic mammogram results were reviewed and discussed with the patient.  This is a category 4 mammogram.  We discussed the clinical significance of this and I answered the patient's questions.  I agree with the radiologist recommendation for stereotactic biopsy.  The patient also agrees.  Please help her to schedule this.  Thank you.

## 2022-07-28 DIAGNOSIS — E78.2 MIXED HYPERLIPIDEMIA: Primary | ICD-10-CM

## 2022-08-01 ENCOUNTER — TREATMENT (OUTPATIENT)
Dept: PHYSICAL THERAPY | Facility: CLINIC | Age: 57
End: 2022-08-01

## 2022-08-01 DIAGNOSIS — Z96.651 S/P TKR (TOTAL KNEE REPLACEMENT), RIGHT: Primary | ICD-10-CM

## 2022-08-01 DIAGNOSIS — M25.561 ACUTE PAIN OF RIGHT KNEE: ICD-10-CM

## 2022-08-01 DIAGNOSIS — Z74.09 IMPAIRED FUNCTIONAL MOBILITY, BALANCE, GAIT, AND ENDURANCE: ICD-10-CM

## 2022-08-01 PROCEDURE — 97140 MANUAL THERAPY 1/> REGIONS: CPT | Performed by: PHYSICAL THERAPIST

## 2022-08-01 PROCEDURE — 97110 THERAPEUTIC EXERCISES: CPT | Performed by: PHYSICAL THERAPIST

## 2022-08-01 PROCEDURE — 97530 THERAPEUTIC ACTIVITIES: CPT | Performed by: PHYSICAL THERAPIST

## 2022-08-01 NOTE — PROGRESS NOTES
Physical Therapy Daily Treatment Note    Patient: Kalpana Hendrix   : 1965  Referring practitioner: Bakari Duran*  Today's Date: 2022  Patient seen for 16 sessions    Visit Diagnoses:    ICD-10-CM ICD-9-CM   1. S/P TKR (total knee replacement), right  Z96.651 V43.65   2. Impaired functional mobility, balance, gait, and endurance  Z74.09 V49.89   3. Acute pain of right knee  M25.561 719.46       Subjective   Kalpana Hendrix reports: that she still has persistent lateral hamstring pain which increase any time that she bends her knee.  No increase in pain with WB.      Objective   Tenderness right lateral hamstring tendons    Normal gait pattern     See Exercise, Manual, and Modality Logs for complete treatment.     Patient Education: cont HEP, increased WB time to prepare for RTW    Assessment/Plan  Kalpana is still having some anterior medial knee pain as well as lateral hamstring discomfort.  Has functional motion in the knee and is walking with a normal gait pattern    Progress strengthening /stabilization /functional activity           Timed:  Manual Therapy:    12     mins  52940;  Therapeutic Exercise:    25/35     mins  26736;     Neuromuscular Micheal:    0    mins  80152;    Therapeutic Activity:     10     mins  13294;     Ultrasound:      0     mins  56484;    Iontophoresis              __0_   mins  Dry Needling               _____   mins        Untimed:  Electrical Stimulation:     0    mins  76775 ( );  Mechanical Traction:             mins  29577;   Paraffin                       _____  mins     Timed Treatment:   47   mins   Total Treatment:     60   mins    Araceli Alexis PT  [place] License # [id number]  Physical Therapist    Electronically signed by Araceli Alexis PT, 22, 12:09 PM EDT

## 2022-08-02 ENCOUNTER — APPOINTMENT (OUTPATIENT)
Dept: WOMENS IMAGING | Facility: HOSPITAL | Age: 57
End: 2022-08-02

## 2022-08-02 PROCEDURE — 88305 TISSUE EXAM BY PATHOLOGIST: CPT

## 2022-08-02 PROCEDURE — 19081 BX BREAST 1ST LESION STRTCTC: CPT | Performed by: RADIOLOGY

## 2022-08-02 PROCEDURE — A4648 IMPLANTABLE TISSUE MARKER: HCPCS | Performed by: RADIOLOGY

## 2022-08-08 ENCOUNTER — TREATMENT (OUTPATIENT)
Dept: PHYSICAL THERAPY | Facility: CLINIC | Age: 57
End: 2022-08-08

## 2022-08-08 DIAGNOSIS — R92.1 BREAST CALCIFICATION, RIGHT: ICD-10-CM

## 2022-08-08 DIAGNOSIS — M25.561 ACUTE PAIN OF RIGHT KNEE: ICD-10-CM

## 2022-08-08 DIAGNOSIS — R92.8 ABNORMAL MAMMOGRAM OF RIGHT BREAST: ICD-10-CM

## 2022-08-08 DIAGNOSIS — Z74.09 IMPAIRED FUNCTIONAL MOBILITY, BALANCE, GAIT, AND ENDURANCE: ICD-10-CM

## 2022-08-08 DIAGNOSIS — Z96.651 S/P TKR (TOTAL KNEE REPLACEMENT), RIGHT: Primary | ICD-10-CM

## 2022-08-08 PROCEDURE — 97530 THERAPEUTIC ACTIVITIES: CPT | Performed by: PHYSICAL THERAPIST

## 2022-08-08 PROCEDURE — 97140 MANUAL THERAPY 1/> REGIONS: CPT | Performed by: PHYSICAL THERAPIST

## 2022-08-08 PROCEDURE — 97110 THERAPEUTIC EXERCISES: CPT | Performed by: PHYSICAL THERAPIST

## 2022-08-08 NOTE — PROGRESS NOTES
Physical Therapy Daily Treatment Note    Patient: Kalpana Hendrix   : 1965  Referring practitioner: Bakari Duran*  Today's Date: 2022  Patient seen for 17 sessions    Visit Diagnoses:    ICD-10-CM ICD-9-CM   1. S/P TKR (total knee replacement), right  Z96.651 V43.65   2. Impaired functional mobility, balance, gait, and endurance  Z74.09 V49.89   3. Acute pain of right knee  M25.561 719.46       Subjective   Kalpana Hendrix reports: that she continues to have the distal lateral hamstring tendon area pain with pressure to it.  Does have some pain after sitting quite a bit.  Was very busy this weekend up on her feet with some additional swelling.  The swelling resolved somewhat yesterday.  Pain 6/10 with pressure to the knee      Objective   Tenderness in the biceps femoris tendon    Heel slide - 0-121*    Minimal swelling in the anterior knee    See Exercise, Manual, and Modality Logs for complete treatment.     Patient Education:    Assessment/Plan  Has functional motion and strength at 3 months post TKR.  Persistent pain in the posterior lateral knee but no instability in the joint.    Progress strengthening /stabilization /functional activity           Timed:  Manual Therapy:    14     mins  78418;  Therapeutic Exercise:    25/40     mins  33703;     Neuromuscular Micheal:    0    mins  24808;    Therapeutic Activity:     15     mins  06267;     Ultrasound:      0     mins  21568;    Iontophoresis              __0_   mins  Dry Needling               _____   mins        Untimed:  Electrical Stimulation:     0    mins  46048 ( );  Mechanical Traction:             mins  57699;   Paraffin                       _____  mins     Timed Treatment:   54   mins   Total Treatment:     75   mins    Araceli Alexis PT  [place] License # [id number]  Physical Therapist    Electronically signed by Araceli Alexis PT, 22, 12:30 PM EDT

## 2022-08-18 ENCOUNTER — TELEPHONE (OUTPATIENT)
Dept: INTERNAL MEDICINE | Facility: CLINIC | Age: 57
End: 2022-08-18

## 2022-08-18 NOTE — TELEPHONE ENCOUNTER
Caller: Kalpana Hendrix    Relationship: Self     Best call back number: [phone]    What medication are you requesting: REPLACEMENT FOR meloxicam (Mobic) 7.5 MG tablet (PT DOES NOT WANT ANY NARCOTIC, DOES NOT LIKE HOW THEY MAKE HER FEEL)     What are your current symptoms: PT HAD KNEE REPLACEMENT SURGERY AND RECENTLY WENT BACK TO WORK. PT STATED THAT SHE IS HAVING A LOT OF PAIN AND WANTS TO KNOW IF THERE IS SOMETHING ELSE SHE CAN TAKE INSTEAD.     If a prescription is needed, what is your preferred pharmacy and phone number: [address] - [facility] [phone] [facility] [phone]

## 2022-08-29 RX ORDER — OMEPRAZOLE 40 MG/1
40 CAPSULE, DELAYED RELEASE ORAL DAILY
Qty: 90 CAPSULE | Refills: 0 | Status: SHIPPED | OUTPATIENT
Start: 2022-08-29 | End: 2023-02-03 | Stop reason: SDUPTHER

## 2022-08-29 NOTE — TELEPHONE ENCOUNTER
Caller: Kalpana Hendrix    Relationship: Self    Best call back number: [phone]  Requested Prescriptions:   Requested Prescriptions     Pending Prescriptions Disp Refills   • omeprazole (priLOSEC) 40 MG capsule 90 capsule 3     Sig: Take 1 capsule by mouth Daily.        Pharmacy where request should be sent: [address] [phone] [facility] [phone] [facility]         Does the patient have less than a 3 day supply:  [x] Yes  [] No    Rhea Tarango Rep   08/29/22 09:10 EDT

## 2023-01-18 ENCOUNTER — TELEPHONE (OUTPATIENT)
Dept: INTERNAL MEDICINE | Facility: CLINIC | Age: 58
End: 2023-01-18
Payer: COMMERCIAL

## 2023-01-18 NOTE — TELEPHONE ENCOUNTER
Caller: Kalpana Hendrix    Relationship: Self    Best call back number: [phone]    What medication are you requesting: STEROID, ANTIBIOTIC, DRAINAGE MUCAS GREEN    What are your current symptoms: SOMETHING FOR SINUS ISSUES     How long have you been experiencing symptoms: 3 OR 4 WEEKS     Have you had these symptoms before:    [x] Yes  [] No    Have you been treated for these symptoms before:   [x] Yes  [] No    If a prescription is needed, what is your preferred pharmacy and phone number:    [address] - [phone]  - [phone]   [phone]    Additional notes:PATIENT NOW LIVES 2 HOURS AWAY AND IS UNABLE TO MAKE IT TO [place].    PATIENT STATED THAT SHE HAS BEEN TRYING TO CLEAN IN THE NEW HOUSE AND IT HAS GOTTEN HER SINUSES AND ALLERGIES STARTED.

## 2023-01-19 NOTE — TELEPHONE ENCOUNTER
Do you want her to go to nearest [facility] since she lives 2 hours away now?

## 2023-01-27 ENCOUNTER — TELEPHONE (OUTPATIENT)
Dept: INTERNAL MEDICINE | Facility: CLINIC | Age: 58
End: 2023-01-27
Payer: COMMERCIAL

## 2023-02-03 RX ORDER — OMEPRAZOLE 40 MG/1
40 CAPSULE, DELAYED RELEASE ORAL DAILY
Qty: 90 CAPSULE | Refills: 1 | Status: SHIPPED | OUTPATIENT
Start: 2023-02-03

## 2023-05-26 ENCOUNTER — TELEPHONE (OUTPATIENT)
Dept: INTERNAL MEDICINE | Facility: CLINIC | Age: 58
End: 2023-05-26
Payer: COMMERCIAL

## 2023-05-26 DIAGNOSIS — E55.9 AVITAMINOSIS D: ICD-10-CM

## 2023-05-26 DIAGNOSIS — Z13.29 SCREENING FOR THYROID DISORDER: ICD-10-CM

## 2023-05-26 DIAGNOSIS — Z00.00 ENCOUNTER FOR SCREENING AND PREVENTATIVE CARE: ICD-10-CM

## 2023-05-26 DIAGNOSIS — E78.2 MIXED HYPERLIPIDEMIA: Primary | ICD-10-CM

## 2023-05-26 NOTE — TELEPHONE ENCOUNTER
Caller: Kalpana Hendrix    Relationship: Self    Best call back number: [phone]    What orders are you requesting (i.e. lab or imaging): LABS FOR PHYSICAL    In what timeframe would the patient need to come in: BY Tuesday MAY 30    Where will you receive your lab/imaging services: [facility]  [phone] ASK FOR LAB DEPT    Additional notes:   PATIENTS LIVE TWO HOURS AWAY AND ARE COMING IN NEXT WEEK FOR PHYSICALS.  WOULD LIKE TO HAVE LABS DRAWN PRIOR TO BEING SEEN BY DR FELDMAN.

## 2023-06-02 ENCOUNTER — OFFICE VISIT (OUTPATIENT)
Dept: INTERNAL MEDICINE | Facility: CLINIC | Age: 58
End: 2023-06-02

## 2023-06-02 VITALS
DIASTOLIC BLOOD PRESSURE: 80 MMHG | HEART RATE: 63 BPM | WEIGHT: 214 LBS | BODY MASS INDEX: 30.64 KG/M2 | RESPIRATION RATE: 18 BRPM | HEIGHT: 70 IN | TEMPERATURE: 96.7 F | SYSTOLIC BLOOD PRESSURE: 140 MMHG | OXYGEN SATURATION: 97 %

## 2023-06-02 DIAGNOSIS — I10 PRIMARY HYPERTENSION: Primary | ICD-10-CM

## 2023-06-02 DIAGNOSIS — Z96.651 CHRONIC KNEE PAIN AFTER TOTAL REPLACEMENT OF RIGHT KNEE JOINT: ICD-10-CM

## 2023-06-02 DIAGNOSIS — M25.561 CHRONIC KNEE PAIN AFTER TOTAL REPLACEMENT OF RIGHT KNEE JOINT: ICD-10-CM

## 2023-06-02 DIAGNOSIS — G89.29 CHRONIC KNEE PAIN AFTER TOTAL REPLACEMENT OF RIGHT KNEE JOINT: ICD-10-CM

## 2023-06-02 DIAGNOSIS — F41.9 ANXIETY: ICD-10-CM

## 2023-06-02 DIAGNOSIS — Z00.00 HEALTHCARE MAINTENANCE: ICD-10-CM

## 2023-06-02 RX ORDER — TRAMADOL HYDROCHLORIDE 50 MG/1
TABLET ORAL
COMMUNITY
Start: 2023-05-22 | End: 2023-06-02

## 2023-06-02 RX ORDER — MELOXICAM 15 MG/1
15 TABLET ORAL DAILY
COMMUNITY
Start: 2023-05-22

## 2023-06-02 RX ORDER — BACLOFEN 10 MG/1
10 TABLET ORAL
COMMUNITY
Start: 2023-05-22

## 2023-06-02 RX ORDER — LISINOPRIL 10 MG/1
10 TABLET ORAL DAILY
Qty: 30 TABLET | Refills: 3 | Status: SHIPPED | OUTPATIENT
Start: 2023-06-02

## 2023-06-02 RX ORDER — GABAPENTIN 100 MG/1
100 CAPSULE ORAL 3 TIMES DAILY
Qty: 90 CAPSULE | Refills: 0 | Status: SHIPPED | OUTPATIENT
Start: 2023-06-02

## 2023-06-02 RX ORDER — VENLAFAXINE HYDROCHLORIDE 37.5 MG/1
37.5 CAPSULE, EXTENDED RELEASE ORAL DAILY
Qty: 30 CAPSULE | Refills: 3 | Status: SHIPPED | OUTPATIENT
Start: 2023-06-02

## 2023-06-02 RX ORDER — ONDANSETRON 4 MG/1
TABLET, FILM COATED ORAL
COMMUNITY
Start: 2023-05-22

## 2023-06-02 NOTE — PROGRESS NOTES
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    Weight as of this encounter: 97.1 kg (214 lb).             Physical Exam  Vitals and nursing note reviewed.   Constitutional:       Appearance: Normal appearance. She is well-developed. She is not diaphoretic.   HENT:      Head: Normocephalic and atraumatic.      Right Ear: Tympanic membrane, ear canal and external ear normal.      Left Ear: Tympanic membrane, ear canal and external ear normal.   Eyes:      General: Lids are normal. No scleral icterus.     Extraocular Movements: Extraocular movements intact.      Conjunctiva/sclera: Conjunctivae normal.   Neck:      Thyroid: No thyroid mass or thyromegaly.      Vascular: No carotid bruit or JVD.   Cardiovascular:      Rate and Rhythm: Normal rate and regular rhythm.      Pulses: Normal pulses.           Radial pulses are 2+ on the right side and 2+ on the left side.      Heart sounds: Normal heart sounds. No murmur heard.  Pulmonary:      Effort: Pulmonary effort is normal. No respiratory distress.      Breath sounds: Normal breath sounds.   Abdominal:    "   Palpations: Abdomen is soft.      Tenderness: There is no right CVA tenderness or left CVA tenderness.   Musculoskeletal:      Cervical back: Normal range of motion.      Right lower leg: No edema.      Left lower leg: No edema.      Comments: Right knee scar burning tenderness medial  Achy tenderness laterally   Skin:     General: Skin is warm and dry.      Coloration: Skin is not pale.      Findings: No erythema or rash.   Neurological:      General: No focal deficit present.      Mental Status: She is alert and oriented to person, place, and time.      Sensory: No sensory deficit.      Deep Tendon Reflexes: Reflexes are normal and symmetric.   Psychiatric:         Mood and Affect: Mood normal.         Behavior: Behavior normal. Behavior is cooperative.         Thought Content: Thought content normal.         Judgment: Judgment normal.        Result Review :    Common labs        7/19/2022    12:07   Common Labs   Total Cholesterol 252     Triglycerides 325     HDL Cholesterol 49     LDL Cholesterol  144                    Assessment and Plan   Diagnoses and all orders for this visit:    1. Anxiety (Primary)  -     venlafaxine XR (Effexor XR) 37.5 MG 24 hr capsule; Take 1 capsule by mouth Daily.  Dispense: 30 capsule; Refill: 3    2. Healthcare maintenance  -     Hepatitis C Antibody    3. Primary hypertension  -     lisinopril (PRINIVIL,ZESTRIL) 10 MG tablet; Take 1 tablet by mouth Daily.  Dispense: 30 tablet; Refill: 3    4. 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  Dispense: 90 capsule; Refill: 0    Chronic knee pain followed up 4 times with surgeon persistent pain  Initiate lisinopril for hypertension  Initiate venlafaxine for anxiety  Follow-up results of testing otherwise as needed or         Follow Up   Return in about 1 month (around 7/2/2023), or if symptoms worsen or fail to improve, for  Recheck.  Patient was given instructions and counseling regarding her condition or for health maintenance advice. Please see specific information pulled into the AVS if appropriate.

## 2023-06-03 LAB
25(OH)D3+25(OH)D2 SERPL-MCNC: 28.7 NG/ML (ref 30–100)
ALBUMIN SERPL-MCNC: 5 G/DL (ref 3.8–4.9)
ALBUMIN/GLOB SERPL: 1.7 {RATIO} (ref 1.2–2.2)
ALP SERPL-CCNC: 102 IU/L (ref 44–121)
ALT SERPL-CCNC: 81 IU/L (ref 0–32)
APPEARANCE UR: CLEAR
AST SERPL-CCNC: 68 IU/L (ref 0–40)
BACTERIA #/AREA URNS HPF: NORMAL /[HPF]
BASOPHILS # BLD AUTO: 0.1 X10E3/UL (ref 0–0.2)
BASOPHILS NFR BLD AUTO: 1 %
BILIRUB SERPL-MCNC: 0.5 MG/DL (ref 0–1.2)
BILIRUB UR QL STRIP: NEGATIVE
BUN SERPL-MCNC: 15 MG/DL (ref 6–24)
BUN/CREAT SERPL: 16 (ref 9–23)
CALCIUM SERPL-MCNC: 10 MG/DL (ref 8.7–10.2)
CASTS URNS QL MICRO: NORMAL /LPF
CHLORIDE SERPL-SCNC: 100 MMOL/L (ref 96–106)
CHOLEST SERPL-MCNC: 267 MG/DL (ref 100–199)
CO2 SERPL-SCNC: 23 MMOL/L (ref 20–29)
COLOR UR: YELLOW
CREAT SERPL-MCNC: 0.91 MG/DL (ref 0.57–1)
EGFRCR SERPLBLD CKD-EPI 2021: 74 ML/MIN/1.73
EOSINOPHIL # BLD AUTO: 0.2 X10E3/UL (ref 0–0.4)
EOSINOPHIL NFR BLD AUTO: 3 %
EPI CELLS #/AREA URNS HPF: NORMAL /HPF (ref 0–10)
ERYTHROCYTE [DISTWIDTH] IN BLOOD BY AUTOMATED COUNT: 13.6 % (ref 11.7–15.4)
GLOBULIN SER CALC-MCNC: 2.9 G/DL (ref 1.5–4.5)
GLUCOSE SERPL-MCNC: ABNORMAL MG/DL
GLUCOSE UR QL STRIP: NEGATIVE
HBA1C MFR BLD: 6.1 % (ref 4.8–5.6)
HCT VFR BLD AUTO: 44.9 % (ref 34–46.6)
HCV IGG SERPL QL IA: NON REACTIVE
HDLC SERPL-MCNC: 78 MG/DL
HGB BLD-MCNC: 14.8 G/DL (ref 11.1–15.9)
HGB UR QL STRIP: NEGATIVE
IMM GRANULOCYTES # BLD AUTO: 0 X10E3/UL (ref 0–0.1)
IMM GRANULOCYTES NFR BLD AUTO: 0 %
KETONES UR QL STRIP: NEGATIVE
LDLC SERPL CALC-MCNC: 164 MG/DL (ref 0–99)
LEUKOCYTE ESTERASE UR QL STRIP: ABNORMAL
LYMPHOCYTES # BLD AUTO: 2.4 X10E3/UL (ref 0.7–3.1)
LYMPHOCYTES NFR BLD AUTO: 37 %
MCH RBC QN AUTO: 28.8 PG (ref 26.6–33)
MCHC RBC AUTO-ENTMCNC: 33 G/DL (ref 31.5–35.7)
MCV RBC AUTO: 88 FL (ref 79–97)
MICRO URNS: ABNORMAL
MONOCYTES # BLD AUTO: 0.5 X10E3/UL (ref 0.1–0.9)
MONOCYTES NFR BLD AUTO: 8 %
NEUTROPHILS # BLD AUTO: 3.2 X10E3/UL (ref 1.4–7)
NEUTROPHILS NFR BLD AUTO: 51 %
NITRITE UR QL STRIP: NEGATIVE
PH UR STRIP: 7 [PH] (ref 5–7.5)
PLATELET # BLD AUTO: 288 X10E3/UL (ref 150–450)
POTASSIUM SERPL-SCNC: ABNORMAL MMOL/L
PROT SERPL-MCNC: 7.9 G/DL (ref 6–8.5)
PROT UR QL STRIP: NEGATIVE
RBC # BLD AUTO: 5.13 X10E6/UL (ref 3.77–5.28)
RBC #/AREA URNS HPF: NORMAL /HPF (ref 0–2)
SODIUM SERPL-SCNC: 141 MMOL/L (ref 134–144)
SP GR UR STRIP: 1.01 (ref 1–1.03)
T4 FREE SERPL-MCNC: 1.24 NG/DL (ref 0.82–1.77)
TRIGL SERPL-MCNC: 141 MG/DL (ref 0–149)
TSH SERPL DL<=0.005 MIU/L-ACNC: 1.81 UIU/ML (ref 0.45–4.5)
UROBILINOGEN UR STRIP-MCNC: 0.2 MG/DL (ref 0.2–1)
VLDLC SERPL CALC-MCNC: 25 MG/DL (ref 5–40)
WBC # BLD AUTO: 6.4 X10E3/UL (ref 3.4–10.8)
WBC #/AREA URNS HPF: NORMAL /HPF (ref 0–5)

## 2023-06-23 PROBLEM — R74.8 ELEVATED LIVER ENZYMES: Status: ACTIVE | Noted: 2023-06-23

## 2023-11-17 ENCOUNTER — OFFICE VISIT (OUTPATIENT)
Dept: INTERNAL MEDICINE | Facility: CLINIC | Age: 58
End: 2023-11-17
Payer: COMMERCIAL

## 2023-11-17 VITALS
WEIGHT: 208.9 LBS | OXYGEN SATURATION: 98 % | SYSTOLIC BLOOD PRESSURE: 154 MMHG | HEART RATE: 65 BPM | BODY MASS INDEX: 29.91 KG/M2 | DIASTOLIC BLOOD PRESSURE: 80 MMHG | HEIGHT: 70 IN | TEMPERATURE: 97.7 F

## 2023-11-17 DIAGNOSIS — Z96.651 CHRONIC KNEE PAIN AFTER TOTAL REPLACEMENT OF RIGHT KNEE JOINT: Primary | ICD-10-CM

## 2023-11-17 DIAGNOSIS — M25.561 CHRONIC KNEE PAIN AFTER TOTAL REPLACEMENT OF RIGHT KNEE JOINT: Primary | ICD-10-CM

## 2023-11-17 DIAGNOSIS — G89.29 CHRONIC KNEE PAIN AFTER TOTAL REPLACEMENT OF RIGHT KNEE JOINT: Primary | ICD-10-CM

## 2023-11-17 PROCEDURE — 99213 OFFICE O/P EST LOW 20 MIN: CPT | Performed by: FAMILY MEDICINE

## 2023-11-17 NOTE — PROGRESS NOTES
"Chief Complaint  Knee Pain (Knee surgery right knee 05/09/2022 ; re injured knee )    Subjective        Kalpana Hendrix presents to [facility]  History of Present Illness  Appointment to discuss acute right knee pain after knee surgery 5/20/2022 and then has has had subsequent persistent pain does not want to return to previous orthopedist.  She has had extensive physical therapy anti-inflammatories no improvement  Objective   Vital Signs:  /80   Pulse 65   Temp 97.7 °F (36.5 °C)   Ht 177 cm (69.69\")   Wt 94.8 kg (208 lb 14.4 oz)   SpO2 98%   BMI 30.25 kg/m²   Estimated body mass index is 30.25 kg/m² as calculated from the following:    Height as of this encounter: 177 cm (69.69\").    Weight as of this encounter: 94.8 kg (208 lb 14.4 oz).             Physical Exam  Vitals and nursing note reviewed.   Constitutional:       Appearance: Normal appearance.   Cardiovascular:      Rate and Rhythm: Normal rate and regular rhythm.      Pulses: Normal pulses.      Heart sounds: Normal heart sounds.   Pulmonary:      Effort: Pulmonary effort is normal.      Breath sounds: Normal breath sounds.   Musculoskeletal:         General: No swelling or tenderness.   Skin:     Findings: No erythema or rash.   Neurological:      Mental Status: She is alert.   Psychiatric:         Mood and Affect: Mood normal.         Behavior: Behavior normal.         Thought Content: Thought content normal.         Judgment: Judgment normal.        Result Review :                   Assessment and Plan   Diagnoses and all orders for this visit:    1. 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  Patient was given instructions and counseling regarding her condition or for health maintenance advice. Please see specific information pulled into the AVS if " appropriate.         Answers submitted by the patient for this visit:  Primary Reason for Visit (Submitted on 11/16/2023)  What is the primary reason for your visit?: Lower Extremity Injury  Lower Extremity Injury Questionnaire (Submitted on 11/16/2023)  Chief Complaint: Lower extremity pain  Incident occurred: more than 1 week ago  Incident location: other  Injury mechanism: unknown, other  Pain location: right knee  Pain quality: aching, stabbing  Pain - numeric: 6/10  Pain course: fluctuating  tingling: No  inability to bear weight: No  loss of motion: Yes  loss of sensation: Yes  muscle weakness: Yes  Foreign body present: no foreign bodies

## 2024-06-13 ENCOUNTER — TELEPHONE (OUTPATIENT)
Dept: INTERNAL MEDICINE | Facility: CLINIC | Age: 59
End: 2024-06-13

## 2024-06-13 NOTE — TELEPHONE ENCOUNTER
Caller: Simeon Kalpana ANABELA    Relationship: Self    Best call back number: [phone]    Requested Prescriptions:   Requested Prescriptions     Pending Prescriptions Disp Refills    omeprazole (priLOSEC) 40 MG capsule 90 capsule 1     Sig: Take 1 capsule by mouth Daily.        Pharmacy where request should be sent: [address] [phone] [facility] [phone]      Last office visit with prescribing clinician: 11/17/2023   Last telemedicine visit with prescribing clinician: Visit date not found   Next office visit with prescribing clinician: 6/28/2024     Additional details provided by patient: PATIENT IS OUT OF THIS MEDICATION.     Does the patient have less than a 3 day supply:  [x] Yes  [] No    Would you like a call back once the refill request has been completed: [] Yes [x] No    Rhea Avelar Rep   06/13/24 10:17 EDT

## 2024-06-13 NOTE — TELEPHONE ENCOUNTER
Caller: Kalpana Hendrix    Relationship: Self    Best call back number: [phone]     What orders are you requesting (i.e. lab or imaging): LABS    In what timeframe would the patient need to come in: 06.28.24    Where will you receive your lab/imaging services: IN OFFICE    Additional notes: PATIENT IS SCHEDULED FOR 6 MONTH FOLLOW UP AND IS REQUESTING LABS.

## 2024-06-14 RX ORDER — OMEPRAZOLE 40 MG/1
40 CAPSULE, DELAYED RELEASE ORAL DAILY
Qty: 90 CAPSULE | Refills: 1 | Status: SHIPPED | OUTPATIENT
Start: 2024-06-14

## 2024-06-14 NOTE — TELEPHONE ENCOUNTER
Have patient arrive at office appointment fasting we will draw labs based on the discussion I had with her in the exam room

## 2024-07-15 DIAGNOSIS — I10 PRIMARY HYPERTENSION: ICD-10-CM

## 2024-07-15 NOTE — TELEPHONE ENCOUNTER
Caller: Kalpana Hendrix ANABELA    Relationship: Self    Best call back number: [phone]     Requested Prescriptions:   Requested Prescriptions     Pending Prescriptions Disp Refills    lisinopril (PRINIVIL,ZESTRIL) 10 MG tablet 30 tablet 3     Sig: Take 1 tablet by mouth Daily.    atorvastatin (LIPITOR) 10 MG tablet 30 tablet 3     Sig: Take 1 tablet by mouth Daily.        Pharmacy where request should be sent: [address] [phone] [place] [phone] FX     Last office visit with prescribing clinician: 11/17/2023   Last telemedicine visit with prescribing clinician: Visit date not found   Next office visit with prescribing clinician: 7/19/2024     Additional details provided by patient: PATIENT HAS BEEN OUT OF MEDICATION FOR 2 WEEKS    Does the patient have less than a 3 day supply:  [x] Yes  [] No    Would you like a call back once the refill request has been completed: [] Yes [x] No      Rhea Avelar Rep   07/15/24 15:25 EDT

## 2024-07-16 RX ORDER — ATORVASTATIN CALCIUM 10 MG/1
10 TABLET, FILM COATED ORAL DAILY
Qty: 30 TABLET | Refills: 1 | Status: SHIPPED | OUTPATIENT
Start: 2024-07-16

## 2024-07-16 RX ORDER — LISINOPRIL 10 MG/1
10 TABLET ORAL DAILY
Qty: 30 TABLET | Refills: 1 | Status: SHIPPED | OUTPATIENT
Start: 2024-07-16

## 2024-07-16 NOTE — TELEPHONE ENCOUNTER
Rx Refill Note  Requested Prescriptions     Pending Prescriptions Disp Refills    lisinopril (PRINIVIL,ZESTRIL) 10 MG tablet 30 tablet 1     Sig: Take 1 tablet by mouth Daily.    atorvastatin (LIPITOR) 10 MG tablet 30 tablet 1     Sig: Take 1 tablet by mouth Daily.      Last office visit with prescribing clinician: 11/17/2023   Last telemedicine visit with prescribing clinician: Visit date not found   Next office visit with prescribing clinician: 7/19/2024                         Would you like a call back once the refill request has been completed: [] Yes [] No    If the office needs to give you a call back, can they leave a voicemail: [] Yes [] No    Monique Ortez MA  07/16/24, 09:11 EDT

## 2024-07-25 ENCOUNTER — TELEMEDICINE (OUTPATIENT)
Dept: INTERNAL MEDICINE | Facility: CLINIC | Age: 59
End: 2024-07-25
Payer: COMMERCIAL

## 2024-07-25 VITALS — DIASTOLIC BLOOD PRESSURE: 90 MMHG | SYSTOLIC BLOOD PRESSURE: 150 MMHG

## 2024-07-25 DIAGNOSIS — F32.9 REACTIVE DEPRESSION: ICD-10-CM

## 2024-07-25 DIAGNOSIS — I10 PRIMARY HYPERTENSION: ICD-10-CM

## 2024-07-25 DIAGNOSIS — F41.9 ANXIETY: Primary | ICD-10-CM

## 2024-07-25 DIAGNOSIS — F51.02 ADJUSTMENT INSOMNIA: ICD-10-CM

## 2024-07-25 PROCEDURE — 99214 OFFICE O/P EST MOD 30 MIN: CPT | Performed by: FAMILY MEDICINE

## 2024-07-25 RX ORDER — ZOLPIDEM TARTRATE 5 MG/1
5 TABLET ORAL NIGHTLY PRN
Qty: 30 TABLET | Refills: 1 | Status: SHIPPED | OUTPATIENT
Start: 2024-07-25

## 2024-07-25 RX ORDER — LISINOPRIL 20 MG/1
20 TABLET ORAL DAILY
Qty: 30 TABLET | Refills: 3 | Status: SHIPPED | OUTPATIENT
Start: 2024-07-25

## 2024-07-25 NOTE — PROGRESS NOTES
"This visit has been rescheduled as a phone visit to comply with patient safety concerns in accordance with CDC recommendations. Total time of discussion was 30 minutes.  You have chosen to receive care through a telephone visit. Do you consent to use a telephone visit for your medical care today? Yes  Patient is [place], [place]  I am in my office [place], [place]    Chief Complaint  Anxiety and Depression    Subjective        Kalpana Hendrix presents to [facility]  History of Present Illness  Patient appointment to discuss anxiety and depression she is starting to feel overwhelmed as she is fix up her house and had to sell it after the untimely death of her  she is living at [place], work has been very supportive of her.  Her grieving has been delayed.  Would like time to have proper counseling as well as trying to get better sleep as it is not achieving at this time she has been hesitant to take anxiety and depression medication in the past  Light elevation in her blood pressure no chest pain shortness of breath or increased fatigue  Objective   Vital Signs:  /90   Estimated body mass index is 30.25 kg/m² as calculated from the following:    Height as of 11/17/23: 177 cm (69.69\").    Weight as of 11/17/23: 94.8 kg (208 lb 14.4 oz).             Physical Exam   Result Review :      June 2023 BUN 15 creatinine 0.91               Assessment and Plan     Diagnoses and all orders for this visit:    1. Anxiety (Primary)  -     Ambulatory Referral to Behavioral Health    2. Reactive depression  -     Ambulatory Referral to Behavioral Health    3. Primary hypertension  -     lisinopril (PRINIVIL,ZESTRIL) 20 MG tablet; Take 1 tablet by mouth Daily.  Dispense: 30 tablet; Refill: 3    4. Adjustment insomnia  -     zolpidem (AMBIEN) 5 MG tablet; Take 1 tablet by mouth At Night As Needed for Sleep.  Dispense: 30 tablet; Refill: 1    Recommend off work until August 5  He should " be able to establish video consultation with behavioral health help with grieving.  And depression         Follow Up     Return in about 1 month (around 8/25/2024).  Patient was given instructions and counseling regarding her condition or for health maintenance advice. Please see specific information pulled into the AVS if appropriate.

## 2024-07-26 ENCOUNTER — TELEPHONE (OUTPATIENT)
Dept: FAMILY MEDICINE CLINIC | Facility: CLINIC | Age: 59
End: 2024-07-26
Payer: COMMERCIAL

## 2024-07-26 NOTE — TELEPHONE ENCOUNTER
Message left for patient, to call [facility] office in [place] to schedule initial evaluation appointment.     Relay

## 2024-07-30 NOTE — TELEPHONE ENCOUNTER
Spoke with patient via phone, scheduled initial evaluation with Larissa ADDISON in [place] office on 7/31/2024 at 9:45 AM. Patient has voiced understanding of upcoming initial evaluation.

## (undated) DEVICE — DUAL CUT SAGITTAL BLADE

## (undated) DEVICE — STERILE PATIENT PROTECTIVE PAD FOR IMP® KNEE POSITIONERS & COHESIVE WRAP (10 / CASE): Brand: DE MAYO KNEE POSITIONER®

## (undated) DEVICE — 450 ML BOTTLE OF 0.05% CHLORHEXIDINE GLUCONATE IN 99.95% STERILE WATER FOR IRRIGATION, USP AND APPLICATOR.: Brand: IRRISEPT ANTIMICROBIAL WOUND LAVAGE

## (undated) DEVICE — SUT ETHIB 2 CV V37 MS/4 30IN MX69G

## (undated) DEVICE — ZIP 24 SURGICAL SKIN CLOSURE DEVICE, PSA: Brand: ZIP 24 SURGICAL SKIN CLOSURE DEVICE

## (undated) DEVICE — SOL NACL 0.9PCT 1000ML

## (undated) DEVICE — GLV SURG BIOGEL LTX PF 7

## (undated) DEVICE — NEEDLE, QUINCKE, 20GX3.5": Brand: MEDLINE

## (undated) DEVICE — GLV SURG SIGNATURE ESSENTIAL PF LTX SZ8.5

## (undated) DEVICE — GLV SURG PREMIERPRO ORTHO LTX PF SZ8.5 BRN

## (undated) DEVICE — APPL CHLORAPREP HI/LITE 26ML ORNG

## (undated) DEVICE — DECANTER BAG 9": Brand: MEDLINE INDUSTRIES, INC.

## (undated) DEVICE — COVER,MAYO STAND,STERILE: Brand: MEDLINE

## (undated) DEVICE — PK KN TOTL 40

## (undated) DEVICE — SOL ISO/ALC RUB 70PCT 4OZ

## (undated) DEVICE — SUT ETHLN 2/0 PS 18IN 585H

## (undated) DEVICE — TRAP FLD MINIVAC MEGADYNE 100ML

## (undated) DEVICE — 2108 SERIES SAGITTAL BLADE, NO OFFSET  (12.4 X 1.19 X 82.1MM)

## (undated) DEVICE — GLV SURG SENSICARE PI MIC PF SZ7 LF STRL

## (undated) DEVICE — INTENDED FOR TISSUE SEPARATION, AND OTHER PROCEDURES THAT REQUIRE A SHARP SURGICAL BLADE TO PUNCTURE OR CUT.: Brand: BARD-PARKER ® CARBON RIB-BACK BLADES

## (undated) DEVICE — TBG PENCL TELESCP MEGADYNE SMOKE EVAC 10FT

## (undated) DEVICE — Device